# Patient Record
Sex: MALE | Race: WHITE | Employment: FULL TIME | URBAN - METROPOLITAN AREA
[De-identification: names, ages, dates, MRNs, and addresses within clinical notes are randomized per-mention and may not be internally consistent; named-entity substitution may affect disease eponyms.]

---

## 2012-07-27 LAB
AVERAGE GLUCOSE: NORMAL
HBA1C MFR BLD: 11.2 %

## 2017-07-31 DIAGNOSIS — Z91.14 NON COMPLIANCE W MEDICATION REGIMEN: ICD-10-CM

## 2017-07-31 DIAGNOSIS — E11.9 TYPE 2 DIABETES MELLITUS WITHOUT COMPLICATION, WITHOUT LONG-TERM CURRENT USE OF INSULIN (HCC): Primary | ICD-10-CM

## 2017-07-31 PROBLEM — Z91.148 NON COMPLIANCE W MEDICATION REGIMEN: Status: ACTIVE | Noted: 2017-07-31

## 2017-08-01 ENCOUNTER — TELEPHONE (OUTPATIENT)
Dept: FAMILY MEDICINE CLINIC | Age: 52
End: 2017-08-01

## 2017-08-01 ENCOUNTER — OFFICE VISIT (OUTPATIENT)
Dept: FAMILY MEDICINE CLINIC | Age: 52
End: 2017-08-01

## 2017-08-01 VITALS
HEART RATE: 100 BPM | WEIGHT: 204.2 LBS | DIASTOLIC BLOOD PRESSURE: 80 MMHG | SYSTOLIC BLOOD PRESSURE: 126 MMHG | BODY MASS INDEX: 27.66 KG/M2 | HEIGHT: 72 IN

## 2017-08-01 DIAGNOSIS — E11.9 TYPE 2 DIABETES MELLITUS WITHOUT COMPLICATION, WITHOUT LONG-TERM CURRENT USE OF INSULIN (HCC): Primary | ICD-10-CM

## 2017-08-01 DIAGNOSIS — Z91.14 NON COMPLIANCE W MEDICATION REGIMEN: ICD-10-CM

## 2017-08-01 PROCEDURE — 99213 OFFICE O/P EST LOW 20 MIN: CPT | Performed by: PHYSICIAN ASSISTANT

## 2017-08-01 RX ORDER — ATORVASTATIN CALCIUM 40 MG/1
40 TABLET, FILM COATED ORAL DAILY
Qty: 30 TABLET | Refills: 3 | Status: SHIPPED | OUTPATIENT
Start: 2017-08-01 | End: 2020-06-15 | Stop reason: ALTCHOICE

## 2017-08-01 ASSESSMENT — PATIENT HEALTH QUESTIONNAIRE - PHQ9
2. FEELING DOWN, DEPRESSED OR HOPELESS: 0
1. LITTLE INTEREST OR PLEASURE IN DOING THINGS: 0
SUM OF ALL RESPONSES TO PHQ9 QUESTIONS 1 & 2: 0
SUM OF ALL RESPONSES TO PHQ QUESTIONS 1-9: 0

## 2017-08-01 ASSESSMENT — ENCOUNTER SYMPTOMS
RESPIRATORY NEGATIVE: 1
GASTROINTESTINAL NEGATIVE: 1

## 2017-08-18 ENCOUNTER — TELEPHONE (OUTPATIENT)
Dept: FAMILY MEDICINE CLINIC | Age: 52
End: 2017-08-18

## 2017-08-18 ENCOUNTER — OFFICE VISIT (OUTPATIENT)
Dept: FAMILY MEDICINE CLINIC | Age: 52
End: 2017-08-18

## 2017-08-18 VITALS
HEIGHT: 72 IN | OXYGEN SATURATION: 97 % | WEIGHT: 205.4 LBS | BODY MASS INDEX: 27.82 KG/M2 | HEART RATE: 110 BPM | DIASTOLIC BLOOD PRESSURE: 80 MMHG | SYSTOLIC BLOOD PRESSURE: 106 MMHG

## 2017-08-18 DIAGNOSIS — M25.571 ARTHRALGIA OF BOTH ANKLES: ICD-10-CM

## 2017-08-18 DIAGNOSIS — E11.9 TYPE 2 DIABETES MELLITUS WITHOUT COMPLICATION, WITHOUT LONG-TERM CURRENT USE OF INSULIN (HCC): ICD-10-CM

## 2017-08-18 DIAGNOSIS — M25.541 ARTHRALGIA OF BOTH HANDS: ICD-10-CM

## 2017-08-18 DIAGNOSIS — M25.542 ARTHRALGIA OF BOTH HANDS: ICD-10-CM

## 2017-08-18 DIAGNOSIS — M25.572 ARTHRALGIA OF BOTH ANKLES: ICD-10-CM

## 2017-08-18 DIAGNOSIS — R60.9 DEPENDENT EDEMA: ICD-10-CM

## 2017-08-18 DIAGNOSIS — M25.522 ARTHRALGIA OF BOTH ELBOWS: Primary | ICD-10-CM

## 2017-08-18 DIAGNOSIS — M25.521 ARTHRALGIA OF BOTH ELBOWS: Primary | ICD-10-CM

## 2017-08-18 LAB — SEDIMENTATION RATE, ERYTHROCYTE: 36 MM/HR (ref 0–20)

## 2017-08-18 PROCEDURE — 36415 COLL VENOUS BLD VENIPUNCTURE: CPT | Performed by: FAMILY MEDICINE

## 2017-08-18 PROCEDURE — 99213 OFFICE O/P EST LOW 20 MIN: CPT | Performed by: FAMILY MEDICINE

## 2017-08-18 RX ORDER — ROSUVASTATIN CALCIUM 20 MG/1
20 TABLET, COATED ORAL DAILY
Qty: 30 TABLET | Refills: 3 | Status: SHIPPED | OUTPATIENT
Start: 2017-08-18 | End: 2020-06-15 | Stop reason: ALTCHOICE

## 2017-08-18 NOTE — TELEPHONE ENCOUNTER
Swelling from trulicity is rare- it is listed as possible, but so are upper respiratory tract infections  If he is having that much swelling, he needs to be seen. If not here, somewhere. Since it is a weekly medicine, when did he stop it? It must be replaced.

## 2017-08-21 LAB
ANA INTERPRETATION: NORMAL
ANTI-NUCLEAR ANTIBODY (ANA): NEGATIVE

## 2017-08-23 ENCOUNTER — TELEPHONE (OUTPATIENT)
Dept: FAMILY MEDICINE CLINIC | Age: 52
End: 2017-08-23

## 2017-08-23 NOTE — TELEPHONE ENCOUNTER
[place] is calling from [address] regarding the Bydureon pens. Insurance is requesting a prior authorization.

## 2017-08-25 ENCOUNTER — TELEPHONE (OUTPATIENT)
Dept: FAMILY MEDICINE CLINIC | Age: 52
End: 2017-08-25

## 2017-08-25 DIAGNOSIS — E11.9 TYPE 2 DIABETES MELLITUS WITHOUT COMPLICATION, WITHOUT LONG-TERM CURRENT USE OF INSULIN (HCC): Primary | ICD-10-CM

## 2017-08-29 ENCOUNTER — OFFICE VISIT (OUTPATIENT)
Dept: FAMILY MEDICINE CLINIC | Age: 52
End: 2017-08-29

## 2017-08-29 VITALS
OXYGEN SATURATION: 98 % | RESPIRATION RATE: 20 BRPM | BODY MASS INDEX: 27.77 KG/M2 | HEART RATE: 124 BPM | SYSTOLIC BLOOD PRESSURE: 108 MMHG | WEIGHT: 205 LBS | HEIGHT: 72 IN | TEMPERATURE: 99.2 F | DIASTOLIC BLOOD PRESSURE: 80 MMHG

## 2017-08-29 DIAGNOSIS — R73.01 ELEVATED FASTING BLOOD SUGAR: ICD-10-CM

## 2017-08-29 DIAGNOSIS — E11.9 TYPE 2 DIABETES MELLITUS WITHOUT COMPLICATION, WITHOUT LONG-TERM CURRENT USE OF INSULIN (HCC): ICD-10-CM

## 2017-08-29 DIAGNOSIS — R21 RASH AND NONSPECIFIC SKIN ERUPTION: ICD-10-CM

## 2017-08-29 DIAGNOSIS — Z91.14 NON COMPLIANCE W MEDICATION REGIMEN: ICD-10-CM

## 2017-08-29 DIAGNOSIS — R60.0 PEDAL EDEMA: ICD-10-CM

## 2017-08-29 DIAGNOSIS — R50.9 DEHYDRATION FEVER: Primary | ICD-10-CM

## 2017-08-29 LAB
GLUCOSE BLD-MCNC: 214 MG/DL
HBA1C MFR BLD: 10.3 %

## 2017-08-29 PROCEDURE — 99213 OFFICE O/P EST LOW 20 MIN: CPT | Performed by: PHYSICIAN ASSISTANT

## 2017-08-29 PROCEDURE — 83036 HEMOGLOBIN GLYCOSYLATED A1C: CPT | Performed by: PHYSICIAN ASSISTANT

## 2017-08-29 PROCEDURE — 82962 GLUCOSE BLOOD TEST: CPT | Performed by: PHYSICIAN ASSISTANT

## 2017-08-29 RX ORDER — IBUPROFEN 200 MG
200 TABLET ORAL EVERY 6 HOURS PRN
COMMUNITY
End: 2019-02-21

## 2017-09-11 ENCOUNTER — TELEPHONE (OUTPATIENT)
Dept: FAMILY MEDICINE CLINIC | Age: 52
End: 2017-09-11

## 2017-09-11 DIAGNOSIS — E11.9 TYPE 2 DIABETES MELLITUS WITHOUT COMPLICATION, WITHOUT LONG-TERM CURRENT USE OF INSULIN (HCC): ICD-10-CM

## 2017-10-13 NOTE — TELEPHONE ENCOUNTER
Tita Davis is requesting refill(s) pen needles  Last OV 8/29/17 (pertaining to medication)  LR  (per medication requested)  Next office visit scheduled or attempted No   If no, reason:

## 2018-10-03 ENCOUNTER — OFFICE VISIT (OUTPATIENT)
Dept: PULMONOLOGY | Age: 53
End: 2018-10-03
Payer: COMMERCIAL

## 2018-10-03 VITALS
WEIGHT: 206 LBS | BODY MASS INDEX: 27.9 KG/M2 | SYSTOLIC BLOOD PRESSURE: 118 MMHG | OXYGEN SATURATION: 99 % | RESPIRATION RATE: 16 BRPM | HEART RATE: 98 BPM | HEIGHT: 72 IN | DIASTOLIC BLOOD PRESSURE: 81 MMHG | TEMPERATURE: 97.4 F

## 2018-10-03 DIAGNOSIS — G47.33 OBSTRUCTIVE SLEEP APNEA: Primary | ICD-10-CM

## 2018-10-03 DIAGNOSIS — Z71.89 ENCOUNTER FOR BIPAP USE COUNSELING: ICD-10-CM

## 2018-10-03 DIAGNOSIS — Z72.821 POOR SLEEP HYGIENE: ICD-10-CM

## 2018-10-03 DIAGNOSIS — Z72.820 SLEEP DEPRIVATION: ICD-10-CM

## 2018-10-03 DIAGNOSIS — G47.10 HYPERSOMNIA: ICD-10-CM

## 2018-10-03 PROCEDURE — 99204 OFFICE O/P NEW MOD 45 MIN: CPT | Performed by: NURSE PRACTITIONER

## 2018-10-03 ASSESSMENT — SLEEP AND FATIGUE QUESTIONNAIRES
NECK CIRCUMFERENCE (INCHES): 16.75
HOW LIKELY ARE YOU TO NOD OFF OR FALL ASLEEP WHILE SITTING AND TALKING TO SOMEONE: 3
HOW LIKELY ARE YOU TO NOD OFF OR FALL ASLEEP WHILE SITTING QUIETLY AFTER LUNCH WITHOUT ALCOHOL: 3
HOW LIKELY ARE YOU TO NOD OFF OR FALL ASLEEP WHILE SITTING INACTIVE IN A PUBLIC PLACE: 3
HOW LIKELY ARE YOU TO NOD OFF OR FALL ASLEEP IN A CAR, WHILE STOPPED FOR A FEW MINUTES IN TRAFFIC: 3
HOW LIKELY ARE YOU TO NOD OFF OR FALL ASLEEP WHILE LYING DOWN TO REST IN THE AFTERNOON WHEN CIRCUMSTANCES PERMIT: 3
HOW LIKELY ARE YOU TO NOD OFF OR FALL ASLEEP WHILE WATCHING TV: 3
HOW LIKELY ARE YOU TO NOD OFF OR FALL ASLEEP WHILE SITTING AND READING: 3
HOW LIKELY ARE YOU TO NOD OFF OR FALL ASLEEP WHEN YOU ARE A PASSENGER IN A CAR FOR AN HOUR WITHOUT A BREAK: 0
ESS TOTAL SCORE: 21

## 2018-10-03 NOTE — PATIENT INSTRUCTIONS
Absolutely no driving if sleepy. It is your responsibility not to drive if fatigued, tired, or sleepy     Please keep all of your future appointments scheduled by [address], [facility]) Pulmonary and Sleep. Remember to bring your sleep machine, cord and mask to each appointment    You should have a follow up appointment scheduled with a sleep medicine provider within 12 months. Routine parts, masks, tubing and filters should all be obtainable from your DME (equipment) provider. Any problems related to mask fit, comfort or functioning of equipment should also be addressed directly with your DME provider. If you are unable to resolve problems, then please notify our office and schedule an appointment to be seen sooner than the usual follow up appointment. For all patients who are evaluated for sleep disorders, never drive or operate motorized equipment while sleepy, fatigued or groggy. Please bring in all of your sleep equipment to all of your appointments, including machine, mask, cords and hoses. Here are some tips to to getting better sleep  1- Avoid napping during the day: This will ensure you are tired at bedtime. If you have to take a nap, sleep less than one hour, before 3 pm.   2- Exercise regularly, but not right before bed: but the timing of the workout is important. Exercising in the morning or early afternoon will not interfere with sleep. Exercising within two hours before bedtime can decrease your ability to fall asleep. Regular exercise is recommended to help you deepen the sleep. 3- Avoid heavy, spicy, or sugary foods 4-6 hours before bedtime: These can affect your ability to stay asleep. 4- Have a light snack before bed: Having an empty stomach can interfere with your sleep. Dairy products and turkey contain tryptophan, which acts as a natural sleep inducer.    5- Stay away from caffeine, nicotine and alcohol at least 4-6 hours before bed: Caffeine and nicotine are stimulants that interfere with your ability to fall asleep. While alcohol has an immediate sleep-inducing effect, a few hours later, as alcohol levels in your blood start to fall, there is a stimulant effect and you will experience fragmented sleep. 6- Take a hot bath 90 minutes before bedtime:  A hot bath will raise your body temperature, but it is the drop in body temperature that may leave you feeling sleepy  7- Develop sleep rituals: it is important to give your body cues that it is time to slow down and sleep. Listen to relaxing music, read something soothing for 15 minutes, have a cup of caffeine free tea, or do relaxation exercises such as yoga or deep breathing help relieve anxiety and reduce muscle tension. 8- Fix a bedtime and an awakening time: Even on weekends! When your sleep cycle has a regular rhythm, you will feel better. 9- Sleep only when sleepy: This reduces the time you are awake in bed. 10- Get into your favorite sleeping position: If you can't fall asleep within 15-30 minutes, get up and do something boring until you feel sleepy. Sit quietly in the dark or read the warranty on your refrigerator. Don't expose yourself to bright light while you are up, it gives cues to your brain that it is time to wake up. 11- Only use your bed for sleeping: Dont use the bed as an office, workroom or recreation room. Let your body \"know\" that the bed is associated with sleeping  12- Use comfortable bedding. Uncomfortable bedding can prevent good sleep. Evaluate whether or not this is a source of your problem, and make appropriate changes. 13- Make sure your bed and bedroom are quiet and comfortable: A hot room can be uncomfortable. A cooler room, along with enough blankets to stay warm is recommended. Get a blackout shade or wear a slumber mask and wear earplugs or get a \"white noise\" machine for light and noise distractions.    14- Use sunlight to set your biological clock: When you get up in the morning, go outside and turn your face to the sun for 15 minutes. 13- Dont take your worries to bed: Leave worries about job, school, daily life, etc., behind when you go to bed. Some people find it useful to assign a \"worry period\" during the evening or afternoon for these issues. CPAP Equipment Cleaning and Disinfecting Schedule  Equipment Cleaning Frequency Instructions  Disinfecting Frequency   Non-Disposable Filters  Weekly Mild soapy water, Rinse, Air Dry Not Required   Disposable Filters Change as needed  2-4 weeks Do Not Wash Not Required   Hose/tubing Daily Mild soapy water, Rinse, Air Dry Once a week   Mask / Nasal Pillows Daily Mild soapy water, Rinse, Air Dry Once a week   Headgear Weekly Hand wash, Mild soapy water, Rinse, Dry  Not Required   Humidifier Daily Empty water daily  Mild soapy water, Rinse well, Air Dry  Once a week   CPAP Unit As Needed Dust with damp cloth,  No detergents or sprays Not Required         Disinfect (per schedule) with 1 part white vinegar and 3 parts water- soak mask and water chamber for 30 minutes every 1-2 weeks, more often if sick. Allow water/vinegar mixture to run through tubing. Allow all equipment to air dry. Drying Hints:   Always hang tubing away from direct sunlight, as this will cause the tubing to become yellow, brittle and crack over a period of time. DO NOT attach the wet tubing to your CPAP unit to blow-dry it. The moisture from the tubing can drain back into your machine. Moisture in your unit can cause sudden pressure increases or short circuits  DO's and DON'Ts:  - Don't use alcohol-based products to clean your mask, because it can cause the materials to become hard and brittle. - Don't put headgear in the washer or dryer  - Don't use any caustic or household cleaning solutions such as bleach on your CPAP   equipment.  - Do follow the recommended cleaning schedule. - Do change your disposable filter frequently.      Adapted From: [employer].uy. com/cleaning. shtm.   These are general suggestions for all models please follow specific s recommendations and specific instructions

## 2018-10-31 ENCOUNTER — TELEPHONE (OUTPATIENT)
Dept: PULMONOLOGY | Age: 53
End: 2018-10-31

## 2018-10-31 NOTE — TELEPHONE ENCOUNTER
GEETA Portillo, APRN - CNP Cc: Joy Denise; Shae Batista; [facility] Practice Support   Caller: Unspecified (Today,  6:54 AM)             Kamlesh will not approve an In lab titration study d/t patient does not have any co-morbidities that would contraindicate a trial of auto pap or have already failed a trial of auto pap. If you do not agree with this determination a peer to peer can be done by calling [phone] refer to member ID # [id number].

## 2018-11-06 ENCOUNTER — OFFICE VISIT (OUTPATIENT)
Dept: PULMONOLOGY | Age: 53
End: 2018-11-06
Payer: COMMERCIAL

## 2018-11-06 VITALS
HEART RATE: 87 BPM | OXYGEN SATURATION: 98 % | TEMPERATURE: 97.7 F | RESPIRATION RATE: 16 BRPM | DIASTOLIC BLOOD PRESSURE: 78 MMHG | SYSTOLIC BLOOD PRESSURE: 114 MMHG | HEIGHT: 72 IN | WEIGHT: 206 LBS | BODY MASS INDEX: 27.9 KG/M2

## 2018-11-06 DIAGNOSIS — G47.33 OBSTRUCTIVE SLEEP APNEA: Primary | ICD-10-CM

## 2018-11-06 DIAGNOSIS — G47.10 HYPERSOMNIA: ICD-10-CM

## 2018-11-06 DIAGNOSIS — Z72.821 POOR SLEEP HYGIENE: ICD-10-CM

## 2018-11-06 DIAGNOSIS — Z71.89 ENCOUNTER FOR BIPAP USE COUNSELING: ICD-10-CM

## 2018-11-06 DIAGNOSIS — R06.83 SNORING: ICD-10-CM

## 2018-11-06 PROCEDURE — 99214 OFFICE O/P EST MOD 30 MIN: CPT | Performed by: NURSE PRACTITIONER

## 2018-11-06 ASSESSMENT — SLEEP AND FATIGUE QUESTIONNAIRES
HOW LIKELY ARE YOU TO NOD OFF OR FALL ASLEEP WHILE WATCHING TV: 3
HOW LIKELY ARE YOU TO NOD OFF OR FALL ASLEEP IN A CAR, WHILE STOPPED FOR A FEW MINUTES IN TRAFFIC: 2
HOW LIKELY ARE YOU TO NOD OFF OR FALL ASLEEP WHILE SITTING INACTIVE IN A PUBLIC PLACE: 3
HOW LIKELY ARE YOU TO NOD OFF OR FALL ASLEEP WHILE SITTING QUIETLY AFTER LUNCH WITHOUT ALCOHOL: 3
ESS TOTAL SCORE: 21
HOW LIKELY ARE YOU TO NOD OFF OR FALL ASLEEP WHILE SITTING AND READING: 3
NECK CIRCUMFERENCE (INCHES): 17
HOW LIKELY ARE YOU TO NOD OFF OR FALL ASLEEP WHILE LYING DOWN TO REST IN THE AFTERNOON WHEN CIRCUMSTANCES PERMIT: 3
HOW LIKELY ARE YOU TO NOD OFF OR FALL ASLEEP WHEN YOU ARE A PASSENGER IN A CAR FOR AN HOUR WITHOUT A BREAK: 2
HOW LIKELY ARE YOU TO NOD OFF OR FALL ASLEEP WHILE SITTING AND TALKING TO SOMEONE: 2

## 2018-11-06 NOTE — PROGRESS NOTES
endocrine in a few weeks. ESS is 21        Sleep Medicine 11/6/2018 10/3/2018 9/8/2015   Sitting and reading 3 3 3   Watching TV 3 3 3   Sitting, inactive in a public place (e.g. a theatre or a meeting) 3 3 3   As a passenger in a car for an hour without a break 2 0 3   Lying down to rest in the afternoon when circumstances permit 3 3 3   Sitting and talking to someone 2 3 2   Sitting quietly after a lunch without alcohol 3 3 3   In a car, while stopped for a few minutes in traffic 2 3 2   Total score 21 21 22   Neck circumference 17 16.75 17.25       Past Medical History:  Past Medical History:   Diagnosis Date    Depression     Gout     Hyperlipidemia     Type II or unspecified type diabetes mellitus without mention of complication, not stated as uncontrolled     Unspecified sleep apnea        Past Surgical History:        Procedure Laterality Date    WISDOM TOOTH EXTRACTION  1986       Allergies:  is allergic to indomethacin. Social History:    TOBACCO:   reports that he has never smoked. He has never used smokeless tobacco.  ETOH:   reports that he does not drink alcohol.     Family History:   Family History   Problem Relation Age of Onset    Cancer Father 39        colon    Diabetes Father     Alzheimer's Disease Father     Diabetes Mother     Diabetes Maternal Aunt     Cancer Sister         breast       Current Medications:    Current Outpatient Prescriptions:     Insulin Pen Needle 31G X 5 MM MISC, 1 each by Does not apply route daily, Disp: 100 each, Rfl: 3    Liraglutide (VICTOZA) 18 MG/3ML SOPN SC injection, Inject 1.2 mg into the skin daily, Disp: 4 Pen, Rfl: 1    ibuprofen (ADVIL;MOTRIN) 200 MG tablet, Take 200 mg by mouth every 6 hours as needed for Pain, Disp: , Rfl:     Exenatide (BYDUREON) 2 MG PEN, Inject 1 Pen into the skin once a week, Disp: 4 Pen, Rfl: 2    rosuvastatin (CRESTOR) 20 MG tablet, Take 1 tablet by mouth daily, Disp: 30 tablet, Rfl: 3    Exenatide (BYDUREON) 2 MG PEN, Inject 1 Pen into the skin once a week, Disp: 2 Pen, Rfl: 0    metFORMIN (GLUCOPHAGE) 500 MG tablet, Take 2 tablets by mouth 2 times daily (with meals), Disp: 60 tablet, Rfl: 3    atorvastatin (LIPITOR) 40 MG tablet, Take 1 tablet by mouth daily, Disp: 30 tablet, Rfl: 3    Dulaglutide (TRULICITY) 1.5 GN/4.2DL SOPN, Inject 1 Pen into the skin once a week, Disp: 4 Pen, Rfl: 1    Dulaglutide (TRULICITY) 9.03 XY/9.0JH SOPN, Inject 0.75 mg into the skin once a week, Disp: 2 Pen, Rfl: 0    MICROLET LANCETS MISC, 1 each by Does not apply route 2 times daily, Disp: 100 each, Rfl: 3      Objective:   PHYSICAL EXAM:    /78 (Site: Left Upper Arm, Position: Sitting)   Pulse 87   Temp 97.7 °F (36.5 °C) (Oral)   Resp 16   Ht 6' (1.829 m)   Wt 206 lb (93.4 kg)   SpO2 98%   BMI 27.94 kg/m²     Physical exam:  Gen: No acute distress. BMI of 27.94  Eyes: PERRL. No sclera icterus. No conjunctival injection. ENT: No discharge. Pharynx clear. Mallampati class IV. Neck: Trachea midline. No obvious mass. Neck circumference 17\"  Resp: No accessory muscle use. No crackles. No wheezes. No rhonchi. CV: Regular rate. Regular rhythm. No murmur or rub. Skin: Warm and dry. M/S: No cyanosis. No obvious joint deformity. Neuro: Awake. Alert. Moves all four extremities. Psych: Oriented x 3. No anxiety. DATA:   11/28/03 PSG RDI 13.2, low SPO2 93%  1/30/14 BiPAP titration recommendations BiPAP 17/13 cm H2O    BiPAP compliance data:  Compliance download report from 9/3/18 to 10/2/18 reviewed today by me and showed patient is using machine 2:43 hrs/night with 20% compliance and AHI 5.6 within this time frame. 6/30days with greater than 4 hours of machine use. 28/30 days with any CPAP use. BIPAP 14/10 cm H20    11/6/18-Unable to download compliance report today. Information obtained from BiPAP at bedside and showed patient is using machine 2:30 hrs/night and AHI 4.1 within this time frame.   4/30days with

## 2018-11-16 ENCOUNTER — HOSPITAL ENCOUNTER (OUTPATIENT)
Dept: SLEEP CENTER | Age: 53
Discharge: HOME | End: 2018-11-16
Payer: COMMERCIAL

## 2018-11-16 DIAGNOSIS — G47.33 OBSTRUCTIVE SLEEP APNEA: ICD-10-CM

## 2018-11-16 PROCEDURE — 95811 POLYSOM 6/>YRS CPAP 4/> PARM: CPT

## 2018-11-20 ENCOUNTER — TELEPHONE (OUTPATIENT)
Dept: PULMONOLOGY | Age: 53
End: 2018-11-20

## 2018-11-20 DIAGNOSIS — G47.33 OBSTRUCTIVE SLEEP APNEA: Primary | ICD-10-CM

## 2018-11-20 DIAGNOSIS — G47.33 OSA (OBSTRUCTIVE SLEEP APNEA): Primary | ICD-10-CM

## 2018-11-20 NOTE — TELEPHONE ENCOUNTER
See scanned email from patient. Okay to order ResMed airsense 10 BiPAP as well as heated tubing per patient preference.   Per recent titration he does not need BiPAP ST.

## 2018-11-21 DIAGNOSIS — G47.33 OBSTRUCTIVE SLEEP APNEA: Primary | ICD-10-CM

## 2018-12-10 ENCOUNTER — TELEPHONE (OUTPATIENT)
Dept: PULMONOLOGY | Age: 53
End: 2018-12-10

## 2018-12-13 ENCOUNTER — TELEPHONE (OUTPATIENT)
Dept: PULMONOLOGY | Age: 53
End: 2018-12-13

## 2018-12-13 NOTE — TELEPHONE ENCOUNTER
Patient called states he is not getting any response from [address] [place] [address] regarding Bipap set up. Called lm for Annika Pak at [address] [facility]. This has been going on for over 1 mo.

## 2018-12-13 NOTE — TELEPHONE ENCOUNTER
Patient called back states [facility] Mccain has called him asked his if he lives in [place] said they did not have his orders. Patient requested orders to be faxed to different company. Orders faxed to [employer] [facility]. Will f/u with patient next week to make sure he was called and set up.

## 2019-02-14 ENCOUNTER — TELEPHONE (OUTPATIENT)
Dept: PULMONOLOGY | Age: 54
End: 2019-02-14

## 2019-02-21 ENCOUNTER — HOSPITAL ENCOUNTER (EMERGENCY)
Age: 54
Discharge: HOME | End: 2019-02-21
Attending: EMERGENCY MEDICINE
Payer: COMMERCIAL

## 2019-02-21 VITALS
OXYGEN SATURATION: 99 % | WEIGHT: 213 LBS | BODY MASS INDEX: 28.85 KG/M2 | RESPIRATION RATE: 14 BRPM | HEIGHT: 72 IN | SYSTOLIC BLOOD PRESSURE: 121 MMHG | HEART RATE: 88 BPM | TEMPERATURE: 97.7 F | DIASTOLIC BLOOD PRESSURE: 63 MMHG

## 2019-02-21 DIAGNOSIS — J02.9 VIRAL PHARYNGITIS: Primary | ICD-10-CM

## 2019-02-21 LAB — S PYO AG THROAT QL: NEGATIVE

## 2019-02-21 PROCEDURE — 87081 CULTURE SCREEN ONLY: CPT

## 2019-02-21 PROCEDURE — 6360000002 HC RX W HCPCS: Performed by: EMERGENCY MEDICINE

## 2019-02-21 PROCEDURE — 99283 EMERGENCY DEPT VISIT LOW MDM: CPT

## 2019-02-21 PROCEDURE — 96372 THER/PROPH/DIAG INJ SC/IM: CPT

## 2019-02-21 PROCEDURE — 87880 STREP A ASSAY W/OPTIC: CPT

## 2019-02-21 RX ORDER — CODEINE PHOSPHATE AND GUAIFENESIN 10; 100 MG/5ML; MG/5ML
5 SOLUTION ORAL 3 TIMES DAILY PRN
Qty: 118 ML | Refills: 0 | Status: SHIPPED | OUTPATIENT
Start: 2019-02-21 | End: 2019-02-26

## 2019-02-21 RX ORDER — IBUPROFEN 800 MG/1
800 TABLET ORAL EVERY 8 HOURS PRN
Qty: 120 TABLET | Refills: 3 | Status: SHIPPED | OUTPATIENT
Start: 2019-02-21 | End: 2020-06-15 | Stop reason: ALTCHOICE

## 2019-02-21 RX ORDER — KETOROLAC TROMETHAMINE 30 MG/ML
15 INJECTION, SOLUTION INTRAMUSCULAR; INTRAVENOUS ONCE
Status: COMPLETED | OUTPATIENT
Start: 2019-02-21 | End: 2019-02-21

## 2019-02-21 RX ADMIN — KETOROLAC TROMETHAMINE: 30 INJECTION, SOLUTION INTRAMUSCULAR; INTRAVENOUS at 20:10

## 2019-02-21 ASSESSMENT — PAIN DESCRIPTION - PROGRESSION: CLINICAL_PROGRESSION: GRADUALLY WORSENING

## 2019-02-21 ASSESSMENT — PAIN DESCRIPTION - DESCRIPTORS: DESCRIPTORS: OTHER (COMMENT)

## 2019-02-21 ASSESSMENT — PAIN - FUNCTIONAL ASSESSMENT: PAIN_FUNCTIONAL_ASSESSMENT: ACTIVITIES ARE NOT PREVENTED

## 2019-02-21 ASSESSMENT — PAIN DESCRIPTION - LOCATION: LOCATION: THROAT

## 2019-02-21 ASSESSMENT — PAIN DESCRIPTION - ONSET: ONSET: PROGRESSIVE

## 2019-02-21 ASSESSMENT — PAIN DESCRIPTION - PAIN TYPE: TYPE: ACUTE PAIN

## 2019-02-21 ASSESSMENT — PAIN DESCRIPTION - FREQUENCY: FREQUENCY: CONTINUOUS

## 2019-02-21 ASSESSMENT — PAIN SCALES - GENERAL: PAINLEVEL_OUTOF10: 6

## 2019-02-23 LAB — S PYO THROAT QL CULT: NORMAL

## 2020-06-15 ENCOUNTER — VIRTUAL VISIT (OUTPATIENT)
Dept: ENDOCRINOLOGY | Age: 55
End: 2020-06-15
Payer: COMMERCIAL

## 2020-06-15 PROCEDURE — 99204 OFFICE O/P NEW MOD 45 MIN: CPT | Performed by: INTERNAL MEDICINE

## 2020-06-15 ASSESSMENT — ENCOUNTER SYMPTOMS
BACK PAIN: 0
ORTHOPNEA: 0
COUGH: 0
PHOTOPHOBIA: 0
DOUBLE VISION: 0
BLURRED VISION: 0
HEMOPTYSIS: 0

## 2020-06-15 NOTE — PROGRESS NOTES
Endocrinology    Rahul Zamora M.D. Phone: [phone]   FAX: [phone]       Ariana Gee   YOB: 1965    Date of Visit:  6/15/2020    Allergies   Allergen Reactions    Indomethacin      diarrhea     No outpatient medications have been marked as taking for the 6/15/20 encounter (Virtual Visit) with Melodie Carrel, MD.         There were no vitals filed for this visit. There is no height or weight on file to calculate BMI. Wt Readings from Last 3 Encounters:   02/21/19 213 lb (96.6 kg)   11/06/18 206 lb (93.4 kg)   10/03/18 206 lb (93.4 kg)     BP Readings from Last 3 Encounters:   02/21/19 121/63   11/06/18 114/78   10/03/18 118/81        Past Medical History:   Diagnosis Date    Depression     Gout     Hyperlipidemia     Type II or unspecified type diabetes mellitus without mention of complication, not stated as uncontrolled     Unspecified sleep apnea      Past Surgical History:   Procedure Laterality Date    WISDOM TOOTH EXTRACTION  1986     Family History   Problem Relation Age of Onset    Cancer Father 39        colon    Diabetes Father     Alzheimer's Disease Father     Diabetes Mother     Diabetes Maternal Aunt     Cancer Sister         breast     Social History     Tobacco Use   Smoking Status Never Smoker   Smokeless Tobacco Never Used      Social History     Substance and Sexual Activity   Alcohol Use No    Alcohol/week: 0.0 standard drinks       HPI      Ariana Gee is a 50 y.o. male who was seen kalpesh virtual visit for management of DM . Self -referred     He saw me in 2014 and then 2015 but did not follow-up. Not following with PCP       Due to the COVID-19 restrictions on close contact interactions the patient's visit was conducted via video link  ( doxy. me) in lieu of a face to face visit.        Location for patient : home  Physician : home    Pursuant to the emergency declaration under the [address] [facility] [place], [address] waiver Psychiatric/Behavioral: Negative for depression, hallucinations, memory loss, substance abuse and suicidal ideas. The patient is not nervous/anxious and does not have insomnia. Physical Exam   Constitutional: He is oriented to person, place, and time. He appears well-developed and well-nourished. Assessment/Plan    1. Type 2 DM     Ariana Gee is a 50 y.o. male has DM. Uncontrolled. A1c 10.3 %     Was last seen in 2015. Not using any medication for 5 years      Will repeat labs and then resume medications. Discussed high risk of complications with uncontrolled DM    Discussed the impact of poor glycemic control on his health. Reviewed microvascular and macrovascular complications of DM. Will order labs  Based on A1c and other labs, will resume diabetic medications          Advised follow-up with the ophthalmologist once year. Last urine microalbumin/cr ratio normal in 04/14. Discussed foot care. Advised to follow-up regularly and follow-up on regular basis. He showed motivation to improve his DM       Advised to see PCP as well.

## 2020-06-16 ENCOUNTER — TELEPHONE (OUTPATIENT)
Dept: ENDOCRINOLOGY | Age: 55
End: 2020-06-16

## 2020-06-17 DIAGNOSIS — E11.9 TYPE 2 DIABETES MELLITUS WITHOUT COMPLICATION, WITHOUT LONG-TERM CURRENT USE OF INSULIN (HCC): ICD-10-CM

## 2020-06-18 LAB
A/G RATIO: 2.2 (ref 1.1–2.2)
ALBUMIN SERPL-MCNC: 4.4 G/DL (ref 3.4–5)
ALP BLD-CCNC: 117 U/L (ref 40–129)
ALT SERPL-CCNC: 41 U/L (ref 10–40)
ANION GAP SERPL CALCULATED.3IONS-SCNC: 11 MMOL/L (ref 3–16)
AST SERPL-CCNC: 20 U/L (ref 15–37)
BILIRUB SERPL-MCNC: 0.7 MG/DL (ref 0–1)
BUN BLDV-MCNC: 16 MG/DL (ref 7–20)
CALCIUM SERPL-MCNC: 9.3 MG/DL (ref 8.3–10.6)
CHLORIDE BLD-SCNC: 96 MMOL/L (ref 99–110)
CHOLESTEROL, TOTAL: 175 MG/DL (ref 0–199)
CO2: 26 MMOL/L (ref 21–32)
CREAT SERPL-MCNC: 1 MG/DL (ref 0.9–1.3)
ESTIMATED AVERAGE GLUCOSE: 257.5 MG/DL
GFR AFRICAN AMERICAN: >60
GFR NON-AFRICAN AMERICAN: >60
GLOBULIN: 2 G/DL
GLUCOSE BLD-MCNC: 243 MG/DL (ref 70–99)
HBA1C MFR BLD: 10.6 %
HDLC SERPL-MCNC: 43 MG/DL (ref 40–60)
LDL CHOLESTEROL CALCULATED: 116 MG/DL
POTASSIUM SERPL-SCNC: 4.5 MMOL/L (ref 3.5–5.1)
SODIUM BLD-SCNC: 133 MMOL/L (ref 136–145)
TOTAL PROTEIN: 6.4 G/DL (ref 6.4–8.2)
TRIGL SERPL-MCNC: 81 MG/DL (ref 0–150)
TSH SERPL DL<=0.05 MIU/L-ACNC: 4.25 UIU/ML (ref 0.27–4.2)
VLDLC SERPL CALC-MCNC: 16 MG/DL

## 2020-06-18 RX ORDER — DULAGLUTIDE 0.75 MG/.5ML
0.75 INJECTION, SOLUTION SUBCUTANEOUS WEEKLY
Qty: 4 PEN | Refills: 3 | Status: SHIPPED | OUTPATIENT
Start: 2020-06-18 | End: 2020-12-18

## 2020-06-19 ENCOUNTER — TELEPHONE (OUTPATIENT)
Dept: ENDOCRINOLOGY | Age: 55
End: 2020-06-19

## 2020-06-22 RX ORDER — FLASH GLUCOSE SENSOR
KIT MISCELLANEOUS
Qty: 2 EACH | Refills: 5 | Status: SHIPPED | OUTPATIENT
Start: 2020-06-22 | End: 2021-01-14 | Stop reason: ALTCHOICE

## 2020-06-22 RX ORDER — FLASH GLUCOSE SCANNING READER
EACH MISCELLANEOUS
Qty: 1 DEVICE | Refills: 0 | Status: SHIPPED | OUTPATIENT
Start: 2020-06-22 | End: 2021-01-14 | Stop reason: ALTCHOICE

## 2020-07-20 RX ORDER — BLOOD-GLUCOSE,RECEIVER,CONT
1 EACH MISCELLANEOUS DAILY
Qty: 1 DEVICE | Refills: 0 | Status: SHIPPED | OUTPATIENT
Start: 2020-07-20 | End: 2020-10-15 | Stop reason: SDUPTHER

## 2020-07-20 RX ORDER — BLOOD-GLUCOSE TRANSMITTER
EACH MISCELLANEOUS
Qty: 1 EACH | Refills: 3 | Status: SHIPPED | OUTPATIENT
Start: 2020-07-20 | End: 2021-01-07 | Stop reason: SDUPTHER

## 2020-07-20 RX ORDER — BLOOD-GLUCOSE SENSOR
EACH MISCELLANEOUS
Qty: 1 EACH | Refills: 11 | Status: SHIPPED | OUTPATIENT
Start: 2020-07-20 | End: 2021-01-07 | Stop reason: SDUPTHER

## 2020-10-15 ENCOUNTER — TELEPHONE (OUTPATIENT)
Dept: PULMONOLOGY | Age: 55
End: 2020-10-15

## 2020-10-15 RX ORDER — BLOOD-GLUCOSE,RECEIVER,CONT
1 EACH MISCELLANEOUS DAILY
Qty: 1 DEVICE | Refills: 0 | Status: SHIPPED | OUTPATIENT
Start: 2020-10-15 | End: 2021-01-07 | Stop reason: SDUPTHER

## 2020-10-15 NOTE — TELEPHONE ENCOUNTER
Aicha Perez, APRN - CNP 14 hours ago (8:12 PM)          I switched jobs and a family member was ill i am very sorry went to [employer] since I just got my new prescription cards please approve  and transmitter would like to do a virtual appointment and share my Dexcom data since I am doing well on it please still allow me to be a patient my voicemail was full and now that is taken care of Thanks

## 2020-10-21 NOTE — TELEPHONE ENCOUNTER
I think he may have been trying to reach endocrine? I think dexcom may be his insulin pump. Can send [employer] message if needed.   Can also r/s appt here as well if he requests

## 2020-10-21 NOTE — TELEPHONE ENCOUNTER
Called patient, JASMYNE full unable to leave message.      Office was unable to reach patient to schedule him an appointment per his request.

## 2020-12-02 NOTE — TELEPHONE ENCOUNTER
LOV- 06/2020  No Show Lu 09/2020  NOV- none        Requested Prescriptions     Pending Prescriptions Disp Refills    metFORMIN (GLUCOPHAGE) 500 MG tablet [Pharmacy Med Name: METFORMIN  MG TABLET] 60 tablet 8     Sig: TAKE 1 TABLET BY MOUTH TWICE A DAY WITH MEALS

## 2020-12-18 RX ORDER — DULAGLUTIDE 0.75 MG/.5ML
0.75 INJECTION, SOLUTION SUBCUTANEOUS WEEKLY
Qty: 2 PEN | Refills: 0 | Status: SHIPPED | OUTPATIENT
Start: 2020-12-18 | End: 2021-01-15

## 2020-12-18 NOTE — TELEPHONE ENCOUNTER
Medication:   Requested Prescriptions     Pending Prescriptions Disp Refills    TRULICITY 0.01 NC/4.5TF SOPN [Pharmacy Med Name: James Silverman 9.05 BZ/0.0 ML PEN]  1     Sig: INJECT 0.75 MG INTO THE SKIN ONCE A WEEK       Last Filled:      Patient Phone Number: [phone] (home)     Last appt: 9/25/2020   Next appt: Visit date not found    Last Labs DM:   Lab Results   Component Value Date    LABA1C 10.6 06/17/2020

## 2021-01-07 DIAGNOSIS — E11.9 TYPE 2 DIABETES MELLITUS WITHOUT COMPLICATION, WITHOUT LONG-TERM CURRENT USE OF INSULIN (HCC): ICD-10-CM

## 2021-01-07 RX ORDER — BLOOD-GLUCOSE TRANSMITTER
EACH MISCELLANEOUS
Qty: 1 EACH | Refills: 3 | Status: SHIPPED | OUTPATIENT
Start: 2021-01-07 | End: 2021-01-18 | Stop reason: SDUPTHER

## 2021-01-07 RX ORDER — BLOOD-GLUCOSE SENSOR
EACH MISCELLANEOUS
Qty: 1 EACH | Refills: 11 | Status: SHIPPED | OUTPATIENT
Start: 2021-01-07 | End: 2022-05-11 | Stop reason: SDUPTHER

## 2021-01-07 RX ORDER — BLOOD-GLUCOSE,RECEIVER,CONT
1 EACH MISCELLANEOUS DAILY
Qty: 1 DEVICE | Refills: 0 | Status: SHIPPED | OUTPATIENT
Start: 2021-01-07 | End: 2021-01-18 | Stop reason: SDUPTHER

## 2021-01-07 NOTE — TELEPHONE ENCOUNTER
Medication:   Requested Prescriptions     Pending Prescriptions Disp Refills    Continuous Blood Gluc Transmit (DEXCOM G6 TRANSMITTER) MISC 1 each 3     Sig: Change every 3 months.  Continuous Blood Gluc Sensor (DEXCOM G6 SENSOR) MISC 1 each 11     Sig: Change every 10 days.     Continuous Blood Gluc  (DEXCOM G6 ) ANIVAL 1 Device 0     Si Device by Does not apply route daily         Last appt: 06/15/2020  Next appt: Visit date not found    Last Labs DM:   Lab Results   Component Value Date    LABA1C 10.6 2020

## 2021-01-15 RX ORDER — DULAGLUTIDE 0.75 MG/.5ML
0.75 INJECTION, SOLUTION SUBCUTANEOUS WEEKLY
Qty: 4 PEN | Refills: 0 | Status: SHIPPED | OUTPATIENT
Start: 2021-01-15 | End: 2021-01-18 | Stop reason: DRUGHIGH

## 2021-01-15 NOTE — PROGRESS NOTES
Peggy Polanco   50 y.o. male   1965    This is patient's first visit with me. Peggy Polanco is here to establish care as their new PCP. 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 [employer] has a PMH significant for:     Patient Active Problem List   Diagnosis    Mixed anxiety and depressive disorder    Obstructive sleep apnea- mild    Gout    Elevated cholesterol    Elevated fasting blood sugar    Family history of colon cancer    Type 2 diabetes mellitus without complication, without long-term current use of insulin ([facility])    Non compliance w medication regimen       Reason(s) for visit:   Chief Complaint   Patient presents with    Annual Exam    Established New Doctor       HPI:    Type II diabetes mellitus:  Regarding diabetes, patient is on medications as noted below in his medication list. Since the last visit. ..  -Last A1c = 10.6 (2020)  -Glucose readings (on average): fastin+  -He has CGM - located on RLQ area  -Denied nausea, dyspepsia, chronic GERD, heartburn, numbness and tingling of hands and feet, etc.    Other:  -Has history of gout, but it's been a while since his last attack. Recent noteworthy labs: 2020  -Lipid panel: normal except LDL = 116  -A1c: 10.6     Health Maintenance history:   [facility] of colon CA? none  -Colon CA screening: c-scope  -Eye exam - 2020 - normal    PDMP report:  -PDMP report was only remarkable for codeine syrup    Allergies   Allergen Reactions    Indomethacin      diarrhea       Current Outpatient Medications on File Prior to Visit   Medication Sig Dispense Refill    metFORMIN (GLUCOPHAGE) 500 MG tablet TAKE 1 TABLET BY MOUTH TWICE A DAY WITH MEALS 60 tablet 8    Continuous Blood Gluc Sensor (DEXCOM G6 SENSOR) MISC Change every 10 days. 1 each 11     No current facility-administered medications on file prior to visit.        Family History   Problem Relation Age of Onset    Cancer Father 39        colon  Diabetes Father     Alzheimer's Disease Father     Diabetes Mother     Diabetes Maternal Aunt     Cancer Sister         breast     Social History     Tobacco Use    Smoking status: Never Smoker    Smokeless tobacco: Never Used   Substance Use Topics    Alcohol use: No     Alcohol/week: 0.0 standard drinks        No results for input(s): WBC, HGB, HCT, MCV, PLT in the last 720 hours. Chemistry        Component Value Date/Time     (L) 06/17/2020 1221    K 4.5 06/17/2020 1221    CL 96 (L) 06/17/2020 1221    CO2 26 06/17/2020 1221    BUN 16 06/17/2020 1221    CREATININE 1.0 06/17/2020 1221        Component Value Date/Time    CALCIUM 9.3 06/17/2020 1221    ALKPHOS 117 06/17/2020 1221    AST 20 06/17/2020 1221    ALT 41 (H) 06/17/2020 1221    BILITOT 0.7 06/17/2020 1221          Lab Results   Component Value Date    ALT 41 (H) 06/17/2020    AST 20 06/17/2020    ALKPHOS 117 06/17/2020    BILITOT 0.7 06/17/2020     Lab Results   Component Value Date    LABA1C 10.6 06/17/2020     Lab Results   Component Value Date    .5 06/17/2020       Review of Systems   Constitutional: Negative for activity change, appetite change, fatigue, fever and unexpected weight change. HENT: Negative for congestion, rhinorrhea, sinus pressure and trouble swallowing. Respiratory: Negative for cough, chest tightness, shortness of breath and wheezing. Cardiovascular: Negative for chest pain, palpitations and leg swelling. Gastrointestinal: Negative for abdominal distention, abdominal pain, blood in stool, constipation, diarrhea, nausea and vomiting. Genitourinary: Negative for dysuria, frequency and hematuria. Musculoskeletal: Negative for arthralgias and back pain. Skin: Negative for rash. Neurological: Negative for dizziness, weakness, light-headedness, numbness and headaches.       Wt Readings from Last 3 Encounters:   01/18/21 208 lb 9.6 oz (94.6 kg)   02/21/19 213 lb (96.6 kg) 11/06/18 206 lb (93.4 kg)       BP Readings from Last 3 Encounters:   01/18/21 116/76   02/21/19 121/63   11/06/18 114/78       Pulse Readings from Last 3 Encounters:   01/18/21 95   02/21/19 88   11/06/18 87       /76   Pulse 95   Temp 96.9 °F (36.1 °C)   Ht 6' (1.829 m)   Wt 208 lb 9.6 oz (94.6 kg)   SpO2 99%   BMI 28.29 kg/m²      Physical Exam  Vitals signs reviewed. Constitutional:       General: He is awake. He is not in acute distress. Appearance: He is not ill-appearing or diaphoretic. HENT:      Head: Normocephalic and atraumatic. Right Ear: External ear normal.      Left Ear: External ear normal.      Nose: Nose normal.   Eyes:      Extraocular Movements: Extraocular movements intact. Conjunctiva/sclera: Conjunctivae normal.   Neck:      Musculoskeletal: Normal range of motion. No erythema. Cardiovascular:      Rate and Rhythm: Normal rate and regular rhythm. Pulmonary:      Effort: Pulmonary effort is normal. No respiratory distress. Breath sounds: No wheezing, rhonchi or rales. Abdominal:      General: Abdomen is flat. There is no distension. Palpations: Abdomen is soft. Musculoskeletal: Normal range of motion. General: No deformity. Right lower leg: No edema. Left lower leg: No edema. Skin:     Coloration: Skin is not cyanotic, jaundiced or pale. Findings: No abrasion, abscess, bruising, ecchymosis, erythema, signs of injury, laceration, lesion, petechiae, rash or wound. Neurological:      General: No focal deficit present. Mental Status: He is alert. Mental status is at baseline. Coordination: Coordination normal.   Psychiatric:         Attention and Perception: Attention and perception normal.         Mood and Affect: Mood and affect normal.         Speech: Speech normal.         Behavior: Behavior normal. Behavior is cooperative. Thought Content:  Thought content normal.       Assessment/Plan: Ghada Cali was seen today for annual exam and established new doctor. Diagnoses and all orders for this visit:    Encounter for preventative adult health care examination  Comments:  Discussed about doing either colonoscopy or Cologuard later in the year. Patient stated that he will think about it. Completed physical form. Type 2 diabetes mellitus without complication, without long-term current use of insulin ([facility])  Comments:  Discussed that having diabetes requires a major lifestyle change. Extensive counseling was given to the patient, who was informed of the microvascular and macrovascular complications of diabetes: increased risk of CAD, MI, CVA, CKD/ESRD (leading to dialysis), diabetic retinopathy, diabetic gastroparesis, diabetic neuropathy, chronic wound infections (eg osteomyelitis), etc.  Patient was informed that diabetics need to be seen every 3 months for routine A1c blood testing and that the goal of A1c is under 7.0. for most patients and under 8.5 for the elderly. Patient was counseled also to take the medications as prescribed and to check glucose as directed, especially if they're symptomatic (malaise, weak, fatigue, clammy, dizzy, etc.) and to keep food/beverage with him at all times in case his glucose is low. He was also advised, especially in the setting of severe neuropathy with numbness to never walk around barefoot. Orders:  -     Basic Metabolic Panel; Future  -     Hemoglobin A1C; Future  -     Dulaglutide 1.5 MG/0.5ML SOPN; Inject 1.5 mg into the skin once a week    Lipid screening  -     Lipid Panel; Future    History of gout  -     URIC ACID; Future    Need for 23-polyvalent pneumococcal polysaccharide vaccine  -     Pneumococcal polysaccharide vaccine 23-valent greater than or equal to 1yo subcutaneous/IM       I reviewed the plan of care with the patient. Patient acknowledged understanding and agreed with plan of care overall.     Medications Discontinued During This Encounter Medication Reason    Continuous Blood Gluc Sensor (FREESTYLE EDMUND 14 DAY SENSOR) MISC Therapy completed    Continuous Blood Gluc  (FREESTYLE EDMUND 14 DAY READER) ANIVAL Therapy completed    TRULICITY 4.63 RJ/7.6MB SOPN DOSE ADJUSTMENT    Continuous Blood Gluc  (DEXCOM G6 ) ANIVAL DUPLICATE    Continuous Blood Gluc Transmit (DEXCOM G6 TRANSMITTER) MISC DUPLICATE        Patient was informed that whether starting or adding a new medication or increasing the dose of a current medication, the benefits and risks have to be considered and weighed over, especially if the patient is taking other medications as well. Patient was also informed that there are no medications that have no side effects and there is always a risk involved with taking a medication. If a side effect were to occur with starting a new medication or with increasing the dose of a current medication that either the medication can be totally discontinued altogether or simply decrease the dose of it and based on this, a follow-up appointment is necessary. The drug allergy list will then be updated with the corresponding side effects if it's deemed to be a true 'drug allergy'. The most common adverse effects of medication(s) were addressed at today's visit. Lastly, the patient was informed that the coverage status of a medication may vary from insurance to insurance and the only way to verify if the medication is covered is to send an actual prescription in. The patient was also informed that the cost of medications vary from insurance to insurance. I reviewed the plan of care with the patient. Patient acknowledged understanding and agreed with plan of care overall.     Estimated length of time of today's visit: 20 minutes    PDMP Monitoring:   Last PDMP Ash Crowder as Reviewed [facility]):  Review User Review Instant Review Result   [address] Steve Sheppard CHRISTIAN 1/9/2021  6:29 PM Reviewed PDMP [1] Follow-up: Return in about 8 weeks (around 3/15/2021) for IP - increased Trulicity dose. . Patient was informed that if his or her symptoms worsen to return here or go to nearest ER if symptoms significantly worsen. Regarding my note: This note was composed to the best of my knowledge and recollection of the encounter with the patient using one of my own customized note templates utilizing a combination of typing and dictating with the [address] [place] [id number] speech recognition software. As a result, the note may possibly have various errors (e.g. spelling, grammar, and non-sensible words/phrases/statements) despite reviewing the note prior to signing it for completion. It is worth noting that most of the time, notes are completed usually long after the patient is seen and are strived to be completed in a timely fashion (ideally within 72 hours of the patient encounter). It is also worth noting that healthcare providers often see several patients a day (e.g. > 15-30 patients/day) in either the outpatient and/or inpatient setting(s). In addition, healthcare providers spend a significant amount of time reviewing multiple patients' charts in preparation for their future encounters (pre-charting) as well as time spent reviewing any labs, imaging, specialist's notes (if relevant), and other documents (e.g. recent ER visits or hospital stays or completing forms such as FMLA, short-term/long-term disability), etc.  Time and effort is also allocated to coordinating with office staff to make sure various things are completed as part of the day-to-day office management responsibilities. Given all this, it is worth pointing out that not every detail can be remembered and not everything discussed is considered relevant, significant, or noteworthy. If one has any questions or concerns about my given note, please take into consideration all these aforementioned things before contacting. Armando Noland M.D.   [address] [facility]    Electronically signed by Eloisa Waters on 1/18/2021 at 9:19 AM.

## 2021-01-15 NOTE — TELEPHONE ENCOUNTER
Medication:   Requested Prescriptions     Pending Prescriptions Disp Refills    TRULICITY 2.71 AY/6.1JN SOPN [Pharmacy Med Name: Nicolas Ferraro 6.44 PB/2.7 ML PEN]  0     Sig: INJECT 0.75 MG INTO THE SKIN ONCE A WEEK       Last Filled:      Patient Phone Number: [phone] (home)     Last appt: 9/25/2020   Next appt: Visit date not found    Last Labs DM:   Lab Results   Component Value Date    LABA1C 10.6 06/17/2020

## 2021-01-18 ENCOUNTER — OFFICE VISIT (OUTPATIENT)
Dept: FAMILY MEDICINE CLINIC | Age: 56
End: 2021-01-18
Payer: COMMERCIAL

## 2021-01-18 VITALS
SYSTOLIC BLOOD PRESSURE: 116 MMHG | HEART RATE: 95 BPM | DIASTOLIC BLOOD PRESSURE: 76 MMHG | WEIGHT: 208.6 LBS | HEIGHT: 72 IN | TEMPERATURE: 96.9 F | BODY MASS INDEX: 28.25 KG/M2 | OXYGEN SATURATION: 99 %

## 2021-01-18 DIAGNOSIS — Z13.220 LIPID SCREENING: ICD-10-CM

## 2021-01-18 DIAGNOSIS — Z00.00 ENCOUNTER FOR PREVENTATIVE ADULT HEALTH CARE EXAMINATION: Primary | ICD-10-CM

## 2021-01-18 DIAGNOSIS — E11.9 TYPE 2 DIABETES MELLITUS WITHOUT COMPLICATION, WITHOUT LONG-TERM CURRENT USE OF INSULIN (HCC): ICD-10-CM

## 2021-01-18 DIAGNOSIS — Z87.39 HISTORY OF GOUT: ICD-10-CM

## 2021-01-18 DIAGNOSIS — Z23 NEED FOR 23-POLYVALENT PNEUMOCOCCAL POLYSACCHARIDE VACCINE: ICD-10-CM

## 2021-01-18 PROCEDURE — 99386 PREV VISIT NEW AGE 40-64: CPT | Performed by: FAMILY MEDICINE

## 2021-01-18 PROCEDURE — 90732 PPSV23 VACC 2 YRS+ SUBQ/IM: CPT | Performed by: FAMILY MEDICINE

## 2021-01-18 PROCEDURE — 90471 IMMUNIZATION ADMIN: CPT | Performed by: FAMILY MEDICINE

## 2021-01-18 ASSESSMENT — ENCOUNTER SYMPTOMS
TROUBLE SWALLOWING: 0
SINUS PRESSURE: 0
ABDOMINAL DISTENTION: 0
COUGH: 0
RHINORRHEA: 0
BLOOD IN STOOL: 0
DIARRHEA: 0
BACK PAIN: 0
VOMITING: 0
ABDOMINAL PAIN: 0
NAUSEA: 0
CONSTIPATION: 0
CHEST TIGHTNESS: 0
SHORTNESS OF BREATH: 0
WHEEZING: 0

## 2021-01-19 DIAGNOSIS — E11.9 TYPE 2 DIABETES MELLITUS WITHOUT COMPLICATION, WITHOUT LONG-TERM CURRENT USE OF INSULIN (HCC): ICD-10-CM

## 2021-01-19 DIAGNOSIS — Z13.220 LIPID SCREENING: ICD-10-CM

## 2021-01-19 DIAGNOSIS — Z87.39 HISTORY OF GOUT: ICD-10-CM

## 2021-01-19 LAB
ANION GAP SERPL CALCULATED.3IONS-SCNC: 10 MMOL/L (ref 3–16)
BUN BLDV-MCNC: 18 MG/DL (ref 7–20)
CALCIUM SERPL-MCNC: 9.6 MG/DL (ref 8.3–10.6)
CHLORIDE BLD-SCNC: 99 MMOL/L (ref 99–110)
CHOLESTEROL, TOTAL: 150 MG/DL (ref 0–199)
CO2: 27 MMOL/L (ref 21–32)
CREAT SERPL-MCNC: 1.2 MG/DL (ref 0.9–1.3)
GFR AFRICAN AMERICAN: >60
GFR NON-AFRICAN AMERICAN: >60
GLUCOSE BLD-MCNC: 244 MG/DL (ref 70–99)
HDLC SERPL-MCNC: 40 MG/DL (ref 40–60)
LDL CHOLESTEROL CALCULATED: 90 MG/DL
POTASSIUM SERPL-SCNC: 4.3 MMOL/L (ref 3.5–5.1)
SODIUM BLD-SCNC: 136 MMOL/L (ref 136–145)
TRIGL SERPL-MCNC: 99 MG/DL (ref 0–150)
URIC ACID, SERUM: 5.5 MG/DL (ref 3.5–7.2)
VLDLC SERPL CALC-MCNC: 20 MG/DL

## 2021-01-20 DIAGNOSIS — E11.65 UNCONTROLLED TYPE 2 DIABETES MELLITUS WITH HYPERGLYCEMIA (HCC): Primary | ICD-10-CM

## 2021-01-20 LAB
ESTIMATED AVERAGE GLUCOSE: 254.7 MG/DL
HBA1C MFR BLD: 10.5 %

## 2021-02-06 DIAGNOSIS — E11.9 TYPE 2 DIABETES MELLITUS WITHOUT COMPLICATION, WITHOUT LONG-TERM CURRENT USE OF INSULIN (HCC): ICD-10-CM

## 2021-02-08 RX ORDER — DULAGLUTIDE 1.5 MG/.5ML
INJECTION, SOLUTION SUBCUTANEOUS
Qty: 4 PEN | Refills: 0 | Status: SHIPPED | OUTPATIENT
Start: 2021-02-08 | End: 2021-03-10 | Stop reason: DRUGHIGH

## 2021-02-26 ENCOUNTER — NURSE TRIAGE (OUTPATIENT)
Dept: OTHER | Facility: CLINIC | Age: 56
End: 2021-02-26

## 2021-02-26 ENCOUNTER — OFFICE VISIT (OUTPATIENT)
Dept: FAMILY MEDICINE CLINIC | Age: 56
End: 2021-02-26
Payer: COMMERCIAL

## 2021-02-26 VITALS
HEART RATE: 87 BPM | BODY MASS INDEX: 27.71 KG/M2 | HEIGHT: 72 IN | OXYGEN SATURATION: 99 % | SYSTOLIC BLOOD PRESSURE: 120 MMHG | WEIGHT: 204.6 LBS | DIASTOLIC BLOOD PRESSURE: 80 MMHG | TEMPERATURE: 97 F

## 2021-02-26 DIAGNOSIS — M65.4 DE QUERVAIN'S SYNDROME (TENOSYNOVITIS): ICD-10-CM

## 2021-02-26 DIAGNOSIS — M79.642 LEFT HAND PAIN: Primary | ICD-10-CM

## 2021-02-26 PROCEDURE — 99213 OFFICE O/P EST LOW 20 MIN: CPT | Performed by: CLINICAL NURSE SPECIALIST

## 2021-02-26 ASSESSMENT — ENCOUNTER SYMPTOMS
VOMITING: 0
NAUSEA: 0
DIARRHEA: 0
ABDOMINAL PAIN: 0
SHORTNESS OF BREATH: 0
CHEST TIGHTNESS: 0
COUGH: 0
WHEEZING: 0
CONSTIPATION: 0

## 2021-02-26 NOTE — PATIENT INSTRUCTIONS
Trial of naproxen twice daily as needed for pain (with food and full glass of water)     No Aleve, Advil, Ibuprofen, Motrin or aspirin while taking the naproxen. Watch for GI symptoms (heart burn, indigestion, epigastric pain or blood in stool)    Can take tylenol as needed/directed (not exceed 9815-5747 mg in a 24 hour period)     Stretches/exercises given. Heat or ice, whichever feels better. Thumb Sicka splint    Follow-up with PCP as scheduled on 3/15/2020    Patient Education        Carpal Tunnel Syndrome: Care Instructions  Overview     Carpal tunnel syndrome is numbness, tingling, weakness, and pain in your hand, wrist, and sometimes forearm. It is caused by pressure on the median nerve. This nerve and several tough tissues called tendons run through a space in the wrist. This space is called the carpal tunnel. The repeated hand motions used in work and some hobbies and sports can put pressure on the median nerve. Pregnancy can cause carpal tunnel syndrome. Several conditions, such as diabetes, arthritis, and an underactive thyroid, can also cause it. You may be able to limit an activity or change the way you do it to reduce your symptoms. You also can take other steps to feel better. If your symptoms are mild, 1 to 2 weeks of home treatment are likely to ease your pain. Surgery is needed only if other treatments do not work. Follow-up care is a key part of your treatment and safety. Be sure to make and go to all appointments, and call your doctor if you are having problems. It's also a good idea to know your test results and keep a list of the medicines you take. How can you care for yourself at home? · If possible, stop or reduce the activity that causes your symptoms. If you cannot stop the activity, take frequent breaks to rest and stretch or change hand positions to do a task. Try switching hands, such as when using a computer mouse. · Try to avoid bending or twisting your wrists. · Ask your doctor if you can take an over-the-counter pain medicine, such as acetaminophen (Tylenol), ibuprofen (Advil, Motrin), or naproxen (Aleve). Be safe with medicines. Read and follow all instructions on the label. · If your doctor prescribes corticosteroid medicine to help reduce pain and swelling, take it exactly as prescribed. Call your doctor if you think you are having a problem with your medicine. · Put ice or a cold pack on your wrist for 10 to 20 minutes at a time to ease pain. Put a thin cloth between the ice and your skin. · If your doctor or your physical or occupational therapist tells you to wear a wrist splint, wear it as directed to keep your wrist in a neutral position. This also eases pressure on your median nerve. · Ask your doctor whether you should have physical or occupational therapy to learn how to do tasks differently. · Try a yoga class to stretch your muscles and build strength in your hands and wrists. Yoga has been shown to ease carpal tunnel symptoms. To prevent carpal tunnel  · When working at a computer, keep your hands and wrists in line with your forearms. Hold your elbows close to your sides. Take a break every 10 to 15 minutes. · Try these exercises:  ? Warm up: Rotate your wrist up, down, and from side to side. Repeat this 4 times. Stretch your fingers far apart, relax them, then stretch them again. Repeat 4 times. Stretch your thumb by pulling it back gently, holding it, and then releasing it. Repeat 4 times. ? Prayer stretch: Start with your palms together in front of your chest just below your chin. Slowly lower your hands toward your waistline while keeping your hands close to your stomach and your palms together until you feel a mild to moderate stretch under your forearms. Hold for 10 to 20 seconds. Repeat 4 times. ? Wrist flexor stretch: Hold your arm in front of you with your palm up. Bend your wrist, pointing your hand toward the floor. With your other hand, gently bend your wrist further until you feel a mild to moderate stretch in your forearm. Hold for 10 to 20 seconds. Repeat 4 times. ? Wrist extensor stretch: Repeat the steps for the wrist flexor stretch, but begin with your extended hand palm down. · Squeeze a rubber exercise ball several times a day to keep your hands and fingers strong. · Avoid holding objects (such as a book) in one position for a long time. When possible, use your whole hand to grasp an object. Using just the thumb and index finger can put stress on the wrist.  · Do not smoke. It can make this condition worse by reducing blood flow to the median nerve. If you need help quitting, talk to your doctor about stop-smoking programs and medicines. These can increase your chances of quitting for good. When should you call for help? Watch closely for changes in your health, and be sure to contact your doctor if:    · Your pain or other problems do not get better with home care.     · You want more information about physical or occupational therapy.     · You have side effects of your corticosteroid medicine, such as:  ? Weight gain. ? Mood changes. ? Trouble sleeping. ? Bruising easily.     · You have any other problems with your medicine. Where can you learn more? Go to https://Divshotrica.Concurrent Thinking. org and sign in to your InLive Interactive account. Enter R432 in the ShopEx box to learn more about \"Carpal Tunnel Syndrome: Care Instructions. \"     If you do not have an account, please click on the \"Sign Up Now\" link. Current as of: March 2, 2020               Content Version: 12.6  © 6169-2296 cocone, eWave Interactive. Care instructions adapted under license by [facility] ([facility]). If you have questions about a medical condition or this instruction, always ask your healthcare professional. [place] [address] any warranty or liability for your use of this information. Patient Education        Carpal Tunnel Syndrome: Exercises  Introduction  Here are some examples of exercises for you to try. The exercises may be suggested for a condition or for rehabilitation. Start each exercise slowly. Ease off the exercises if you start to have pain. You will be told when to start these exercises and which ones will work best for you. Warm-up stretches  When you no longer have pain or numbness, you can do exercises to help prevent carpal tunnel syndrome from coming back. Do not do any stretch or movement that is uncomfortable or painful. 1. Rotate your wrist up, down, and from side to side. Repeat 4 times. 2. Stretch your fingers far apart. Relax them, and then stretch them again. Repeat 4 times. 3. Stretch your thumb by pulling it back gently, holding it, and then releasing it. Repeat 4 times. How to do the exercises  Prayer stretch   1. Start with your palms together in front of your chest just below your chin. 2. Slowly lower your hands toward your waistline, keeping your hands close to your stomach and your palms together until you feel a mild to moderate stretch under your forearms. 3. Hold for at least 15 to 30 seconds. Repeat 2 to 4 times. Wrist flexor stretch   1. Extend your arm in front of you with your palm up. 2. Bend your wrist, pointing your hand toward the floor. 3. With your other hand, gently bend your wrist farther until you feel a mild to moderate stretch in your forearm. 4. Hold for at least 15 to 30 seconds. Repeat 2 to 4 times. Wrist extensor stretch   1. Repeat steps 1 through 4 of the stretch above, but begin with your extended hand palm down. · Until your symptoms are better, stop the activities that caused the pain. · Avoid moving the hand and wrist that hurt. · Follow your doctor's directions for wearing a splint to keep your thumb and wrist from moving. · Try ice or heat. ? Put ice or a cold pack on your thumb and wrist for 10 to 20 minutes at a time. Put a thin cloth between the ice and your skin. ? You can use heat for 20 to 30 minutes, 2 or 3 times a day. Try using a heating pad, hot shower, or hot pack. · Ask your doctor if you can take an over-the-counter pain medicine, such as acetaminophen (Tylenol), ibuprofen (Advil, Motrin), or naproxen (Aleve). Be safe with medicines. Read and follow all instructions on the label. When should you call for help? Watch closely for changes in your health, and be sure to contact your doctor if:    · You have new or worse pain.     · You have new or worse numbness or tingling in your hand or fingers.     · Your hand feels weaker.     · You do not get better as expected. Where can you learn more? Go to https://Staff Ranker.WigWag. org and sign in to your O'ol Blue account. Enter E875 in the [facility] box to learn more about \"De Quervain's Tenosynovitis: Care Instructions. \"     If you do not have an account, please click on the \"Sign Up Now\" link. Current as of: March 2, 2020               Content Version: 12.6  © 8653-0647 Healthwise, Incorporated. Care instructions adapted under license by [facility] ([facility]). If you have questions about a medical condition or this instruction, always ask your healthcare professional. [place] [address] any warranty or liability for your use of this information.          Patient Education        Canary Jurist Disease: Exercises  Introduction Here are some examples of exercises for you to try. The exercises may be suggested for a condition or for rehabilitation. Start each exercise slowly. Ease off the exercises if you start to have pain. You will be told when to start these exercises and which ones will work best for you. How to do the exercises  Thumb lifts   4. Place your hand on a flat surface, with your palm up. 5. Lift your thumb away from your palm to make a \"C\" shape. 6. Hold for about 6 seconds. 7. Repeat 8 to 12 times. Passive thumb MP flexion   4. Hold your hand in front of you, and turn your hand so your little finger faces down and your thumb faces up. (Your hand should be in the position used for shaking someone's hand.) You may also rest your hand on a flat surface. 5. Use the fingers on your other hand to bend your thumb down at the point where your thumb connects to your palm. 6. Hold for at least 15 to 30 seconds. 7. Repeat 2 to 4 times. Finkelstein stretch   5. Hold your arms out in front of you. (Your hand should be in the position used for shaking someone's hand.)  6. Bend your thumb toward your palm. 7. Use your other hand to gently stretch your thumb and wrist downward until you feel the stretch on the thumb side of your wrist.  8. Hold for at least 15 to 30 seconds. 9. Repeat 2 to 4 times. Resisted ulnar deviation   For this exercise, you will need elastic exercise material, such as surgical tubing or Thera-Band.  2. Sit leaning forward with your legs slightly spread and your elbow on your thigh. 3. Grasp one end of the band with your palm down, and step on the other end with the foot opposite the hand holding the band. 4. Slowly bend your wrist sideways and away from your knee. 5. Repeat 8 to 12 times. Follow-up care is a key part of your treatment and safety. Be sure to make and go to all appointments, and call your doctor if you are having problems. It's also a good idea to know your test results and keep a list of the medicines you take. Where can you learn more? Go to https://chpepiceweb.Equidate. org and sign in to your Splore account. Enter Y608 in the Guided Interventions box to learn more about \"De Quervain's Disease: Exercises. \"     If you do not have an account, please click on the \"Sign Up Now\" link. Current as of: March 2, 2020               Content Version: 12.6  © 2006-2020 CIRQY, Incorporated. Care instructions adapted under license by [facility] ([facility]). If you have questions about a medical condition or this instruction, always ask your healthcare professional. [address] any warranty or liability for your use of this information.

## 2021-02-26 NOTE — TELEPHONE ENCOUNTER
Laid off work 1 month ago. Went back this week. Has severe pain in left hand. Weakness and numbness. Reason for Disposition   SEVERE pain (e.g., excruciating, unable to use hand at all)    Answer Assessment - Initial Assessment Questions  1. ONSET: \"When did the pain start? \"      Monday     2. LOCATION: \"Where is the pain located? \"      Left hand     3. PAIN: \"How bad is the pain? \" (Scale 1-10; or mild, moderate, severe)    - MILD (1-3): doesn't interfere with normal activities    - MODERATE (4-7): interferes with normal activities (e.g., work or school) or awakens from sleep    - SEVERE (8-10): excruciating pain, unable to use hand at all      When working: 10/10  Constant 4-5/10    4. WORK OR EXERCISE: \"Has there been any recent work or exercise that involved this part of the body? \"      Yes, work     5. CAUSE: \"What do you think is causing the pain? \"      Injury from work     6. AGGRAVATING FACTORS: \"What makes the pain worse? \" (e.g., using computer)      Picking things up, using hands       7. OTHER SYMPTOMS: \"Do you have any other symptoms? \" (e.g., neck pain, swelling, rash, numbness, fever)      Numbness and weakness     8. PREGNANCY: \"Is there any chance you are pregnant? \" \"When was your last menstrual period? \"      No    Protocols used: HAND AND WRIST PAIN-ADULT-[place]    Patient called [facility])  with red flag complaint. Brief description of triage: hand pain. Triage indicates for patient to be seen in office today. Care advice provided, patient verbalizes understanding; denies any other questions or concerns; instructed to call back for any new or worsening symptoms. Writer provided warm transfer to Batool Celis at [place] for appointment scheduling. Attention Provider: Thank you for allowing me to participate in the care of your patient. The patient was connected to triage in response to information provided to the [facility].   Please do not respond through this encounter as the response is not directed to a shared pool.

## 2021-02-26 NOTE — PROGRESS NOTES
SUBJECTIVE:    Patient ID:  Carol Tse is a 50 y.o. male      Patient is here for an acute visit for complaints of left hand pain for about a week. Patient went back to work on Monday after being of fur low. Right hand dominate. 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 Hand Pain   The incident occurred 5 to 7 days ago. Incident location: possible at work. The injury mechanism was repetitive motion. The pain is present in the left hand. The quality of the pain is described as aching, burning, cramping, shooting and stabbing. The pain does not radiate. Pain scale: 5-10. The pain has been constant since the incident. Pertinent negatives include no chest pain. Exacerbated by: \"work\" certaion movments. He has tried heat for the symptoms. Current Outpatient Medications on File Prior to Visit   Medication Sig Dispense Refill    TRULICITY 1.5 HW/0.6XT SOPN INJECT 1.5 MG INTO THE SKIN ONCE A WEEK 4 pen 0    dapagliflozin (FARXIGA) 10 MG tablet Take 1 tablet by mouth every morning 90 tablet 0    Continuous Blood Gluc Sensor (DEXCOM G6 SENSOR) MISC Change every 10 days. 1 each 11    metFORMIN (GLUCOPHAGE) 500 MG tablet TAKE 1 TABLET BY MOUTH TWICE A DAY WITH MEALS 60 tablet 8     No current facility-administered medications on file prior to visit.        Past Medical History:   Diagnosis Date    Depression     Gout     Hyperlipidemia     Type II or unspecified type diabetes mellitus without mention of complication, not stated as uncontrolled     Unspecified sleep apnea      Past Surgical History:   Procedure Laterality Date    WISDOM TOOTH EXTRACTION  1986     Family History   Problem Relation Age of Onset    Cancer Father 39        colon    Diabetes Father     Alzheimer's Disease Father     Diabetes Mother  Diabetes Maternal Aunt     Cancer Sister         breast     Social History     Socioeconomic History    Marital status:      Spouse name: Not on file    Number of children: 2    Years of education: Not on file    Highest education level: Not on file   Occupational History    Occupation:    Social Needs    Financial resource strain: Not on file    Food insecurity     Worry: Not on file     Inability: Not on file   [employer] needs     Medical: Not on file     Non-medical: Not on file   Tobacco Use    Smoking status: Never Smoker    Smokeless tobacco: Never Used   Substance and Sexual Activity    Alcohol use: No     Alcohol/week: 0.0 standard drinks    Drug use: No    Sexual activity: Not on file   Lifestyle    Physical activity     Days per week: Not on file     Minutes per session: Not on file    Stress: Not on file   Relationships    Social connections     Talks on phone: Not on file     Gets together: Not on file     Attends Confucianism service: Not on file     Active member of club or organization: Not on file     Attends meetings of clubs or organizations: Not on file     Relationship status: Not on file    Intimate partner violence     Fear of current or ex partner: Not on file     Emotionally abused: Not on file     Physically abused: Not on file     Forced sexual activity: Not on file   Other Topics Concern    Not on file   Social History Narrative    Not on file       Review of Systems   Constitutional: Negative for chills and fever. Eyes: Negative for visual disturbance. Respiratory: Negative for cough, chest tightness, shortness of breath and wheezing. Cardiovascular: Negative for chest pain and palpitations. Gastrointestinal: Negative for abdominal pain, constipation, diarrhea, nausea and vomiting. Musculoskeletal: Positive for arthralgias (left hand/thumb). Negative for myalgias. Skin: Negative for rash. Neurological: Negative for headaches. OBJECTIVE:    Physical Exam  Vitals signs and nursing note reviewed. Constitutional:       General: He is not in acute distress. Appearance: He is well-developed. HENT:      Head: Normocephalic and atraumatic. Right Ear: External ear normal.      Left Ear: External ear normal.      Nose: Nose normal.   Eyes:      Conjunctiva/sclera: Conjunctivae normal.      Pupils: Pupils are equal, round, and reactive to light. Neck:      Musculoskeletal: Normal range of motion and neck supple. Trachea: No tracheal deviation. Cardiovascular:      Rate and Rhythm: Normal rate and regular rhythm. Heart sounds: Normal heart sounds. Pulmonary:      Effort: Pulmonary effort is normal. No respiratory distress. Breath sounds: Normal breath sounds. No wheezing or rales. Chest:      Chest wall: No tenderness. Abdominal:      General: Bowel sounds are normal. There is no distension. Palpations: Abdomen is soft. Tenderness: There is no abdominal tenderness. Musculoskeletal: Normal range of motion. Skin:     General: Skin is warm and dry. Findings: No rash. Neurological:      Mental Status: He is alert and oriented to person, place, and time. Psychiatric:         Behavior: Behavior normal.       /80   Pulse 87   Temp 97 °F (36.1 °C)   Ht 6' (1.829 m)   Wt 204 lb 9.6 oz (92.8 kg)   SpO2 99%   BMI 27.75 kg/m²    BP Readings from Last 3 Encounters:   02/26/21 120/80   01/18/21 116/76   02/21/19 121/63      Wt Readings from Last 3 Encounters:   02/26/21 204 lb 9.6 oz (92.8 kg)   01/18/21 208 lb 9.6 oz (94.6 kg)   02/21/19 213 lb (96.6 kg)       ASSESSMENT & PLAN:    1. Left hand pain  - naproxen (NAPROSYN) 375 MG tablet; Take 1 tablet by mouth 2 times daily (with meals)  Dispense: 60 tablet; Refill: 0    2. De Quervain's syndrome (tenosynovitis)  - naproxen (NAPROSYN) 375 MG tablet; Take 1 tablet by mouth 2 times daily (with meals)  Dispense: 60 tablet;  Refill: 0

## 2021-02-28 RX ORDER — NAPROXEN 375 MG/1
375 TABLET ORAL 2 TIMES DAILY WITH MEALS
Qty: 60 TABLET | Refills: 0 | Status: SHIPPED | OUTPATIENT
Start: 2021-02-28 | End: 2021-03-10 | Stop reason: ALTCHOICE

## 2021-03-09 ENCOUNTER — TELEPHONE (OUTPATIENT)
Dept: FAMILY MEDICINE CLINIC | Age: 56
End: 2021-03-09

## 2021-03-09 NOTE — PROGRESS NOTES
True Aponte   50 y.o. male   1965    HPI:    Patient was last seen by me on 1/18/2021, which was his first visit with me to establish care as his new PCP. Reason(s) for visit:   Chief Complaint   Patient presents with    Hand Pain     L. Pt reported that it's throbbing. Saw Olena Kern in Feb.    Discuss Medications     Trulicity       Type II diabetes mellitus:  Regarding diabetes, patient is on medications as noted below in his medication list. Since the last visit. ..  -Last A1c = 10.5 (1/19/2022)  -Glucose readings (on average): 228 around midnight. Currently 156.  -He has CGM - located on [facility] area  -Denied nausea, dyspepsia, chronic GERD, heartburn, numbness and tingling of hands and feet, etc.  -Other: regarding any side effects with Trulicity 1.5 mg, patient stated that he had no issues    Chronic hand pain:  -In addition above, we discussed for the first time of chronic left hand pain. He actually discussed this issue with my partner (Olena Concha - NP) on 2/26/2021. He reported then of 1 week history of left hand pain. Patient is right-hand dominant. He attributed this left hand pain to work. 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 He stated that he has to tug on the carpet and has to \"tear them apart\". He has to do this several hours a day and works full-time. His history has become so severe that he was \"written up\" because he cannot continue performing his work. He has been under a lot of stress lately.    -He described his left hand pain as aching, burning, cramping, shooting, and stabbing in nature.   He denied issues with numbness.  -No prior history of CTS; however, he did report of having \"several EMGs done before\" and a remote history of an MRI of cervical (?) spine.    -Patient stated that his pain is mainly triggered when his hand is curled. He vocalized desire to quit his job and is currently studying to work in [employer] for [employer] ([employer]). His step-son works as a  at [employer].    Other:  -Patient also informed me for the first time that based on his MRI findings that there is plan to have \"a metal plate inserted on the back of my neck\"; however the surgery was never done due to his poorly controlled diabetes. There are no MRI let alone CT findings of his spine anywhere on file. He has had several x-rays of his cervical spine done with the most recent one being done on 4/26/2017, which showed \"degenerative changes particular at C5-C6). No signs of fracture were seen. Allergies   Allergen Reactions    Indomethacin      diarrhea       Current Outpatient Medications on File Prior to Visit   Medication Sig Dispense Refill    dapagliflozin (FARXIGA) 10 MG tablet Take 1 tablet by mouth every morning 90 tablet 0    metFORMIN (GLUCOPHAGE) 500 MG tablet TAKE 1 TABLET BY MOUTH TWICE A DAY WITH MEALS 60 tablet 8    Continuous Blood Gluc Sensor (DEXCOM G6 SENSOR) MISC Change every 10 days. 1 each 11     No current facility-administered medications on file prior to visit. Family History   Problem Relation Age of Onset    Cancer Father 39        colon    Diabetes Father     Alzheimer's Disease Father     Diabetes Mother     Diabetes Maternal Aunt     Cancer Sister         breast       Social History     Tobacco Use    Smoking status: Never Smoker    Smokeless tobacco: Never Used   Substance Use Topics    Alcohol use: No     Alcohol/week: 0.0 standard drinks        No results for input(s): WBC, HGB, HCT, MCV, PLT in the last 720 hours.        Chemistry        Component Value Date/Time     01/19/2021 1545    K 4.3 01/19/2021 1545    CL 99 01/19/2021 1545    CO2 27 01/19/2021 1545    BUN 18 01/19/2021 1545    CREATININE 1.2 01/19/2021 1545        Component Value Date/Time    CALCIUM 9.6 01/19/2021 1545    ALKPHOS 117 06/17/2020 1221    AST 20 06/17/2020 1221    ALT 41 (H) 06/17/2020 1221    BILITOT 0.7 06/17/2020 1221          Lab Results   Component Value Date    ALT 41 (H) 06/17/2020    AST 20 06/17/2020    ALKPHOS 117 06/17/2020    BILITOT 0.7 06/17/2020     Lab Results   Component Value Date    LABA1C 10.5 01/19/2021     Lab Results   Component Value Date    .7 01/19/2021       Review of Systems   Constitutional: Negative for activity change, appetite change, fatigue, fever and unexpected weight change. HENT: Negative for congestion, rhinorrhea, sinus pressure and trouble swallowing. Respiratory: Negative for cough, chest tightness, shortness of breath and wheezing. Cardiovascular: Negative for chest pain, palpitations and leg swelling. Gastrointestinal: Negative for abdominal distention, abdominal pain, blood in stool, constipation, diarrhea, nausea and vomiting. Genitourinary: Negative for dysuria, frequency and hematuria. Musculoskeletal: Positive for arthralgias, neck pain and neck stiffness. Negative for back pain, gait problem and joint swelling. Skin: Negative for rash. Neurological: Positive for weakness. Negative for dizziness, light-headedness, numbness and headaches. Psychiatric/Behavioral: Positive for sleep disturbance. Wt Readings from Last 3 Encounters:   03/10/21 203 lb 6.4 oz (92.3 kg)   02/26/21 204 lb 9.6 oz (92.8 kg)   01/18/21 208 lb 9.6 oz (94.6 kg)       BP Readings from Last 3 Encounters:   03/10/21 110/62   02/26/21 120/80   01/18/21 116/76       Pulse Readings from Last 3 Encounters:   03/10/21 85   02/26/21 87   01/18/21 95       /62   Pulse 85   Temp 97.1 °F (36.2 °C)   Wt 203 lb 6.4 oz (92.3 kg)   SpO2 99%   BMI 27.59 kg/m²      Physical Exam  Vitals signs reviewed. Constitutional:       General: He is awake.  He is not in acute distress. Appearance: He is overweight. He is not ill-appearing or diaphoretic. HENT:      Head: Normocephalic and atraumatic. Right Ear: External ear normal.      Left Ear: External ear normal.      Nose: Nose normal.   Eyes:      Extraocular Movements: Extraocular movements intact. Conjunctiva/sclera: Conjunctivae normal.   Neck:      Musculoskeletal: Normal range of motion. No erythema. Cardiovascular:      Rate and Rhythm: Normal rate and regular rhythm. Pulmonary:      Effort: Pulmonary effort is normal. No respiratory distress. Breath sounds: No wheezing, rhonchi or rales. Abdominal:      General: Abdomen is flat. There is no distension. Palpations: Abdomen is soft. Musculoskeletal: Normal range of motion. General: No deformity. Right lower leg: No edema. Left lower leg: No edema. Skin:     Coloration: Skin is not cyanotic, jaundiced or pale. Findings: No abrasion, abscess, bruising, ecchymosis, erythema, signs of injury, laceration, lesion, petechiae, rash or wound. Neurological:      General: No focal deficit present. Mental Status: He is alert. Mental status is at baseline. Coordination: Coordination normal.   Psychiatric:         Attention and Perception: Attention and perception normal.         Mood and Affect: Mood and affect normal.         Speech: Speech normal.         Behavior: Behavior normal. Behavior is cooperative. Thought Content: Thought content normal.       Assessment/Plan:   Nidia Holloway was seen today for hand pain and discuss medications. Diagnoses and all orders for this visit:    Type 2 diabetes mellitus without complication, without long-term current use of insulin ([facility] [address]  Comments:  Patient declined going up on Metformin. I discussed with patient that having diabetes requires a major lifestyle change.   Extensive counseling was given to the patient, who was informed of the microvascular and macrovascular complications of diabetes: increased risk of CAD, MI, CVA, CKD/ESRD (leading to dialysis), diabetic retinopathy, diabetic gastroparesis, diabetic neuropathy, chronic wound infections (eg osteomyelitis), etc.      Patient was informed that diabetics need to be seen every 3 months for routine A1c blood testing and that the goal of A1c is under 7.0 (and under 6.5 for more aggressive treatment) for most patients and under 8.5 for the elderly. Patient was counseled also to take the medications as prescribed and to check glucose as directed, especially if they're symptomatic (malaise, weak, fatigue, clammy, dizzy, etc.) and to keep food/beverage with him at all times in case his glucose is low. Patient was also advised, especially in the setting of severe neuropathy with numbness to never walk around barefoot. Informed patient of benefits of being on GLP1 & SGLT2 therapy that go beyond glycemic control - low risk of hypoglycemia, weight loss, decreased risk of cardiovascular complications, etc.  Orders:  -     Dulaglutide (TRULICITY) 3 FW/6.0JK SOPN; Inject 3 mg into the skin once a week    Cervical radiculopathy  Comments: Will obtain XR first and we discussed at some point obtaining MRI cervical area. Orders:  -     XR CERVICAL SPINE (4-5 VIEWS); Future  -     gabapentin (NEURONTIN) 300 MG capsule; Take 1 capsule by mouth 2 times daily for 30 days. Intended supply: 30 days  -     meloxicam (MOBIC) 15 MG tablet; Take 1 tablet by mouth daily as needed for Pain (arthritis)    Left hand pain  Comments:  Informed patient that he is at risk of developing CTS. Orders:  -     diclofenac sodium (VOLTAREN) 1 % GEL; Apply 2 g topically every 6 hours as needed for Pain (back pain)  -     meloxicam (MOBIC) 15 MG tablet; Take 1 tablet by mouth daily as needed for Pain (arthritis)    Other diabetic neurological complication associated with type 2 diabetes mellitus (HCC)  -     gabapentin (NEURONTIN) 300 MG capsule;  Take 1 capsule by mouth 2 times daily for 30 days. Intended supply: 30 days    Stress at work      I reviewed the plan of care with the patient. Patient acknowledged understanding and agreed with plan of care overall. Medications Discontinued During This Encounter   Medication Reason    TRULICITY 1.5 LL/5.0EA SOPN DOSE ADJUSTMENT    naproxen (NAPROSYN) 375 MG tablet Alternate therapy        General information on medications:  -When it comes to medications, whether with starting or adding a new medication or increasing the dose of a current medication, the benefits and risks have to always be considered and weighed over, especially if one is taking other medications as well.   -Patient was informed that there are no medications that have no side effects and that there is always a risk involved with taking a medication.    -If a side effect were to occur with starting a new medication or with increasing the dose of a current medication that either the medication can be totally discontinued altogether or simply decrease the dose of it and if this would be the case a follow-up appointment would be deemed necessary.    -The drug allergy list will then be updated with the corresponding side effect(s) if it's deemed to be a true 'drug allergy'. -The most common adverse effects of medication(s) were addressed at today's visit.    -Lastly, the patient was informed that the coverage status of a medication may vary from insurance to insurance and the only way to verify if the medication is covered is to send an actual prescription in. The patient was also informed that the cost of medications vary from insurance to insurance. Estimated length of time of today's visit: 30 minutes    Follow-up: Return in about 8 weeks (around 5/5/2021) for A1c check. .     Patient was informed that if his or her symptoms worsen to follow up with me sooner or go to the nearest ER if the symptoms are very significant and warrant higher level of care. Regarding my note: This note was composed to the best of my knowledge and recollection of the encounter with the patient using one of my own customized note templates utilizing a combination of typing and dictating with the [address] [place] [address] speech recognition software. As a result, the note may possibly have various errors (e.g. spelling, grammar, and non-sensible words/phrases/statements) despite reviewing the note prior to signing it for completion. It is worth noting that most of the time, progress notes are completed usually long after the patient was seen. Every effort is made to have notes as well as other things that needed to be attended to (e.g. medication refills, MyChart messages, documents that require reviewing, etc.) be addressed and completed in a timely fashion (ideally within 72 hours of the patient encounter). It is also worth noting that healthcare providers often see several patients a day (e.g. > 15-30 patients/day) in either the outpatient and/or inpatient setting(s). In addition, healthcare providers spend a significant amount of time reviewing multiple patients' charts in preparation for their future encounters (pre-charting) as well as time spent reviewing any labs, imaging, specialist's notes (if relevant), and other documents (e.g. recent ER visits or hospital stays or completing forms such as FMLA, short-term/long-term disability), etc.  Time and effort is also allocated to coordinating with office staff to make sure various things are completed as part of the day-to-day office management responsibilities. Given all this, it is worth pointing out that not every detail can be remembered and not everything discussed is considered relevant, significant, or noteworthy.   This note is primarily written for myself utilizing my own customized templates so I can recall what happened during that visit as well as written in mind for other healthcare

## 2021-03-09 NOTE — TELEPHONE ENCOUNTER
Is he asking to have an x-ray without being seen for it? I've only seen him one time and that was back in January and that was for something else. He can reschedule for an earlier appointment b/c anyways we need to discuss about his issues with diabetes as well. Please try and schedule preferably for either Thurs or Fri this week. My schedule is almost full for tomorrow. Armando Han

## 2021-03-10 ENCOUNTER — OFFICE VISIT (OUTPATIENT)
Dept: FAMILY MEDICINE CLINIC | Age: 56
End: 2021-03-10
Payer: COMMERCIAL

## 2021-03-10 VITALS
SYSTOLIC BLOOD PRESSURE: 110 MMHG | DIASTOLIC BLOOD PRESSURE: 62 MMHG | HEART RATE: 85 BPM | BODY MASS INDEX: 27.59 KG/M2 | WEIGHT: 203.4 LBS | TEMPERATURE: 97.1 F | OXYGEN SATURATION: 99 %

## 2021-03-10 DIAGNOSIS — M54.12 CERVICAL RADICULOPATHY: ICD-10-CM

## 2021-03-10 DIAGNOSIS — E11.49 OTHER DIABETIC NEUROLOGICAL COMPLICATION ASSOCIATED WITH TYPE 2 DIABETES MELLITUS (HCC): ICD-10-CM

## 2021-03-10 DIAGNOSIS — M79.642 LEFT HAND PAIN: ICD-10-CM

## 2021-03-10 DIAGNOSIS — Z56.6 STRESS AT WORK: ICD-10-CM

## 2021-03-10 DIAGNOSIS — E11.9 TYPE 2 DIABETES MELLITUS WITHOUT COMPLICATION, WITHOUT LONG-TERM CURRENT USE OF INSULIN (HCC): Primary | ICD-10-CM

## 2021-03-10 PROCEDURE — 99214 OFFICE O/P EST MOD 30 MIN: CPT | Performed by: FAMILY MEDICINE

## 2021-03-10 RX ORDER — MELOXICAM 15 MG/1
15 TABLET ORAL DAILY PRN
Qty: 30 TABLET | Refills: 2 | Status: SHIPPED | OUTPATIENT
Start: 2021-03-10 | End: 2021-08-02 | Stop reason: SDUPTHER

## 2021-03-10 RX ORDER — DULAGLUTIDE 3 MG/.5ML
3 INJECTION, SOLUTION SUBCUTANEOUS WEEKLY
Qty: 4 PEN | Refills: 1 | Status: SHIPPED | OUTPATIENT
Start: 2021-03-10 | End: 2021-04-23

## 2021-03-10 RX ORDER — GABAPENTIN 300 MG/1
300 CAPSULE ORAL 2 TIMES DAILY
Qty: 60 CAPSULE | Refills: 0 | Status: SHIPPED | OUTPATIENT
Start: 2021-03-10 | End: 2021-04-23

## 2021-03-10 SDOH — HEALTH STABILITY - MENTAL HEALTH: OTHER PHYSICAL AND MENTAL STRAIN RELATED TO WORK: Z56.6

## 2021-03-11 ASSESSMENT — ENCOUNTER SYMPTOMS
NAUSEA: 0
SINUS PRESSURE: 0
BLOOD IN STOOL: 0
COUGH: 0
WHEEZING: 0
SHORTNESS OF BREATH: 0
TROUBLE SWALLOWING: 0
ABDOMINAL PAIN: 0
VOMITING: 0
DIARRHEA: 0
RHINORRHEA: 0
CHEST TIGHTNESS: 0
ABDOMINAL DISTENTION: 0
CONSTIPATION: 0
BACK PAIN: 0

## 2021-04-05 ENCOUNTER — HOSPITAL ENCOUNTER (OUTPATIENT)
Dept: GENERAL RADIOLOGY | Age: 56
Discharge: HOME | End: 2021-04-05
Payer: COMMERCIAL

## 2021-04-05 DIAGNOSIS — M54.12 CERVICAL RADICULOPATHY: ICD-10-CM

## 2021-04-05 PROCEDURE — 72050 X-RAY EXAM NECK SPINE 4/5VWS: CPT

## 2021-04-26 DIAGNOSIS — E11.9 TYPE 2 DIABETES MELLITUS WITHOUT COMPLICATION, WITHOUT LONG-TERM CURRENT USE OF INSULIN (HCC): ICD-10-CM

## 2021-04-26 RX ORDER — BLOOD-GLUCOSE TRANSMITTER
EACH MISCELLANEOUS
COMMUNITY
Start: 2021-03-30 | End: 2021-04-26 | Stop reason: SDUPTHER

## 2021-04-26 RX ORDER — BLOOD-GLUCOSE TRANSMITTER
EACH MISCELLANEOUS
Qty: 1 EACH | Refills: 3 | Status: SHIPPED | OUTPATIENT
Start: 2021-04-26 | End: 2022-05-11 | Stop reason: SDUPTHER

## 2021-05-03 ENCOUNTER — TELEPHONE (OUTPATIENT)
Dept: FAMILY MEDICINE CLINIC | Age: 56
End: 2021-05-03

## 2021-05-03 DIAGNOSIS — M25.78 OSTEOPHYTE OF CERVICAL SPINE: ICD-10-CM

## 2021-05-03 DIAGNOSIS — E11.65 UNCONTROLLED TYPE 2 DIABETES MELLITUS WITH HYPERGLYCEMIA (HCC): Primary | ICD-10-CM

## 2021-05-03 DIAGNOSIS — M54.12 CERVICAL RADICULOPATHY: ICD-10-CM

## 2021-07-28 ENCOUNTER — TELEPHONE (OUTPATIENT)
Dept: FAMILY MEDICINE CLINIC | Age: 56
End: 2021-07-28

## 2021-07-28 NOTE — TELEPHONE ENCOUNTER
My schedule is wide open next week or thereafter. I can't write a letter for someone whom I haven't seen in several months. Armando Reyes 177

## 2021-07-28 NOTE — TELEPHONE ENCOUNTER
Scheduled pt OV for Monday. Pt is unable to come in any sooner but stated that his job is on the line. Pt would like a letter stating that he does have an upcoming appointment with us to give to his employer and sent back to him via [address] Olegario Louis for letter?

## 2021-07-28 NOTE — TELEPHONE ENCOUNTER
----- Message from Jessy Stoddard sent at 7/28/2021 12:37 PM EDT -----  Subject: Appointment Request    Reason for Call: Routine Existing Condition Follow Up    QUESTIONS  Type of Appointment? Established Patient  Reason for appointment request? Available appointments did not meet   patient need  Additional Information for Provider? pt is requesting a letter for work   regarding having to be on light duty for back/spinal issues as well as is   wanting to likelihood of having meds called in; offered to sched pt for   7/29 but will not for his work schedule  ---------------------------------------------------------------------------  --------------  [address]  What is the best way for the office to contact you? OK to leave message on   voicemail  Preferred Call Back Phone Number? [phone]  ---------------------------------------------------------------------------  --------------  SCRIPT ANSWERS  Relationship to Patient? Self  (Is the patient requesting to be seen urgently for their symptoms?)? No  Is this follow up request related to routine Diabetes Management? No  Are you having any new concerns about your existing condition? No  Have you been diagnosed with, awaiting test results for, or told that you   are suspected of having COVID-19 (Coronavirus)? (If patient has tested   negative or was tested as a requirement for work, school, or travel and   not based on symptoms, answer no)? No  Do you currently have flu-like symptoms including fever or chills, cough,   shortness of breath, difficulty breathing, or new loss of taste or smell? No  Have you had close contact with someone with COVID-19 in the last 14 days? No  (Service Expert  click yes below to proceed with [employer] As Usual   Scheduling)?  Yes

## 2021-07-31 NOTE — PROGRESS NOTES
Carmen Pike   49 y.o. male   1965    HPI:    Patient was last seen by me on 3/10/2021. Reason(s) for visit:   Chief Complaint   Patient presents with    Letter for School/Work     Pt stated that he spoke with his manager who put him in a less strenuous job but they recently put him back in his old position. Pt would like a note from PCP putting him on light duty.  Back Pain    Pain     Nerve pain.  Medication Refill    Diabetes     Pt stated that sugars through the middle of the night are either high or low. Mood swings. Hunger. Chronic arthritis:  Patient main reason for visit is regarding work related stress and request be placed on \"light duty. \"  He stated that he has been with his current company for about a year. He stated that he's unable to do his job because very physically stressful and demanding. Currently working 6:30a-3p M-F and occasionally on weekends. His work has high turn around. He's working in [employer] work environment. 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 He stated that he feels his back pop when trying to do this process, especially when trying to pull the carpet out. 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 He stated that he's capable of working 8 hours 5 days a week. When asked about other work responsibilities/roles, he stated that each position requires being interviewed. He stated that one of his co-workers was hit by a forklift and still making her work. He stated that he's suppose to be given 20 min breaks and he usually does. Regarding stress outside of work, he stated that he has had some stress with being a parent. His 3 children are not of driving age. Type II DM:  -Patient stated that his glucose is running around 400's and \"craving sugar. \"  He admitted to having a poor diet. -Reported of numbness and paresthesia of his feet, but not so much his hands      Allergies   Allergen Reactions    Indomethacin      diarrhea       Current Outpatient Medications on File Prior to Visit   Medication Sig Dispense Refill    Continuous Blood Gluc Transmit (DEXCOM G6 TRANSMITTER) MISC CHANGE EVERY 3 MONTHS. 1 each 3    diclofenac sodium (VOLTAREN) 1 % GEL Apply 2 g topically every 6 hours as needed for Pain (back pain) 150 g 2    Continuous Blood Gluc Sensor (DEXCOM G6 SENSOR) MISC Change every 10 days. 1 each 11     No current facility-administered medications on file prior to visit.         Family History   Problem Relation Age of Onset    Cancer Father 39        colon    Diabetes Father     Alzheimer's Disease Father     Diabetes Mother     Diabetes Maternal Aunt     Cancer Sister         breast       Social History     Tobacco Use    Smoking status: Never Smoker    Smokeless tobacco: Never Used   Substance Use Topics    Alcohol use: No     Alcohol/week: 0.0 standard drinks        Lab Results   Component Value Date    WBC 11.4 (H) 06/23/2015    HGB 15.3 06/23/2015    HCT 47.0 06/23/2015    MCV 89.5 06/23/2015     06/23/2015       Chemistry        Component Value Date/Time     01/19/2021 1545    K 4.3 01/19/2021 1545    CL 99 01/19/2021 1545    CO2 27 01/19/2021 1545    BUN 18 01/19/2021 1545    CREATININE 1.2 01/19/2021 1545        Component Value Date/Time    CALCIUM 9.6 01/19/2021 1545    ALKPHOS 117 06/17/2020 1221    AST 20 06/17/2020 1221    ALT 41 (H) 06/17/2020 1221    BILITOT 0.7 06/17/2020 1221          Lab Results   Component Value Date    ALT 41 (H) 06/17/2020    AST 20 06/17/2020    ALKPHOS 117 06/17/2020    BILITOT 0.7 06/17/2020     Lab Results   Component Value Date    LABA1C 10.5 01/19/2021     Lab Results   Component Value Date    .7 01/19/2021       Review of Systems   Constitutional: Negative for activity change, appetite change, Rhythm: Normal rate and regular rhythm. Pulmonary:      Effort: Pulmonary effort is normal. No respiratory distress. Breath sounds: No wheezing, rhonchi or rales. Abdominal:      General: Abdomen is flat. There is no distension. Palpations: Abdomen is soft. Musculoskeletal:         General: No deformity. Normal range of motion. Cervical back: Normal range of motion. No erythema. Right lower leg: No edema. Left lower leg: No edema. Skin:     Coloration: Skin is not cyanotic, jaundiced or pale. Findings: No abrasion, abscess, bruising, ecchymosis, erythema, signs of injury, laceration, lesion, petechiae, rash or wound. Neurological:      General: No focal deficit present. Mental Status: He is alert. Mental status is at baseline. GCS: GCS eye subscore is 4. GCS verbal subscore is 5. GCS motor subscore is 6. Cranial Nerves: No cranial nerve deficit, dysarthria or facial asymmetry. Motor: No weakness, tremor, atrophy or seizure activity. Coordination: Coordination normal.      Gait: Gait is intact. Psychiatric:         Attention and Perception: Attention and perception normal.         Mood and Affect: Mood and affect normal.         Speech: Speech normal.         Behavior: Behavior normal. Behavior is cooperative. Thought Content: Thought content normal.        Assessment/Plan:   Adenike Conn was seen today for letter for school/work, back pain, pain, medication refill and diabetes. Diagnoses and all orders for this visit:    Encounter for completion of form with patient  Comments:  Work restriction for 3 months effective 8/3/2021. Form completed with original form gven to patient. Stressful workplace    Uncontrolled type 2 diabetes mellitus with hyperglycemia ([facility] [address]  Comments:  I discussed with patient that having diabetes requires a major lifestyle change.   Extensive counseling was given to the patient, who was informed of the microvascular and MG tablet; Take 1 tablet by mouth 2 times daily for 30 days. Left hand pain  Comments:  Informed patient that he is at risk of developing CTS. Orders:  -     meloxicam (MOBIC) 15 MG tablet; Take 1 tablet by mouth daily as needed for Pain (arthritis)      I reviewed the plan of care with the patient. Patient acknowledged understanding and agreed with plan of care overall. Medications Discontinued During This Encounter   Medication Reason    TRULICITY 3 NI/7.7CZ SOPN DOSE ADJUSTMENT    dapagliflozin (FARXIGA) 10 MG tablet Alternate therapy    metFORMIN (GLUCOPHAGE) 500 MG tablet Alternate therapy    gabapentin (NEURONTIN) 300 MG capsule DOSE ADJUSTMENT    meloxicam (MOBIC) 15 MG tablet REORDER        General information on medications:  -When it comes to medications, whether with starting or adding a new medication or increasing the dose of a current medication, the benefits and risks have to always be considered and weighed over, especially if one is taking other medications as well. -There are no medications that have no side effects and that there is always a risk involved with taking a medication.    -If a side effect were to occur with starting a new medication or with increasing the dose of a current medication that either the medication can be totally discontinued altogether or simply decrease the dose of it and if this would be the case a follow-up appointment would be deemed necessary.    -The drug allergy list will then be updated with the corresponding side effect(s) if it's deemed to be a true 'drug allergy'.     -The most common adverse effects of medication(s) were addressed at today's visit.    -Lastly, the coverage status of a medication may vary from insurance to insurance and the only way to verify if the medication is covered is to send an actual prescription in.    -The drug formulary of each insurance changes without any warning or notification to the healthcare provider let alone the pharmacy.  -The cost of medications vary from insurance to insurance and the cost is always subject to change just like the drug formulary. Level of service (LOS):   -Multiple variables/factors are considered with determining LOS: duration of time spent with patient, time spent reviewing patient's labs, notes (including my own and other specialist's notes if relevant), number of issues addressed during the visit, answering any questions/concerns brought up by the patient, and the overall complexity and decision making regarding the issues addressed during the visit, etc.  -Length of visit is one factor (not the main one) in determining the LOS code selected. -There are different codes to distinguish new patient vs old (established) patient.  -There is a specific LOS/code for a (annual) physical (otherwise known as a wellness visit or biometric screening) can only be used once a year. In addition, the healthcare provider will determine whether the current visit can be considered a 'physical.' Discussing about specific issues/concerns, especially if those issues/concerns are new does not constitute as a 'physical' visit. Follow-up: Return in about 4 weeks (around 8/30/2021) for IP - diabetes check up. .     Patient was informed that if his or her symptoms worsen to follow up with me sooner or go to the nearest ER if the symptoms are very significant and warrant higher level of care. Regarding my note:  -This note was composed (by me only and not with assistance via a scribe) to the best of my knowledge and recollection of the encounter with the patient using one of my own customized note templates utilizing a combination of typing and dictating with the [address] [place] [address] speech recognition software.   As a result, the note may possibly contain various errors (e.g. spelling, grammar, and non-sensible words/phrases/statements) despite reviewing the note prior to signing it for completion.    -It is worth noting that most of the time, progress notes are completed usually long after the patient was seen. Every effort is made to have notes as well as other things that needed to be attended to (e.g. medication refills, MyChart messages, documents that require reviewing, etc.) be addressed and completed in a timely fashion (ideally within 72 hours of the patient encounter). -It is also worth noting that healthcare providers often see several patients a day (e.g. > 15-30 patients/day) in either the outpatient and/or inpatient setting(s). In addition, healthcare providers spend a significant amount of time reviewing multiple patients' charts in preparation for their future encounters (pre-charting) as well as time spent reviewing any labs, imaging, specialist's notes (if relevant), and other documents (e.g. recent ER visits or hospital stays or completing forms such as FMLA, short-term/long-term disability), etc.   -Time is also allocated to attending to patient's messages on [employer], phone calls from various people, attending to prescription refills/orders, and also attending meetings or conferences throughout the day. Time and effort is also allocated to coordinating with office staff to make sure various things are completed as part of the day-to-day office management responsibilities. For the most part, substantial portion of each given day is usually spent with direct patient care followed by documenting.  -Given all this, it is worth pointing out that not every detail of the encounter can be remembered and not everything discussed is considered relevant, significant, or noteworthy. It is also worth mentioning that my recollection of the visit may differ from the respective patient's recollection of the visit.   This note is primarily written for myself (the healthcare provider) utilizing one of my own customized templates so I can recall what happened during that visit and may be used for future reference for myself to prepare for follow up appointments. My note is also written in mind for other healthcare professionals (who have their own extensive medical background and experience) for their own understanding (of what happened during the visit) and also more importantly to hopefully help influence and play a role in formulating their plan of care along with their own unique medical expertise. If one has any questions or concerns about my given note, please take into consideration all these aforementioned things before contacting. Armando Connell M.D.   [address] [facility]    Electronically signed by Kaila Rivas M.D. on 8/2/2021 at 11:23 PM.

## 2021-08-02 ENCOUNTER — OFFICE VISIT (OUTPATIENT)
Dept: FAMILY MEDICINE CLINIC | Age: 56
End: 2021-08-02
Payer: COMMERCIAL

## 2021-08-02 VITALS
TEMPERATURE: 98.1 F | OXYGEN SATURATION: 99 % | WEIGHT: 209 LBS | SYSTOLIC BLOOD PRESSURE: 110 MMHG | BODY MASS INDEX: 28.35 KG/M2 | HEART RATE: 110 BPM | DIASTOLIC BLOOD PRESSURE: 82 MMHG

## 2021-08-02 DIAGNOSIS — M79.642 LEFT HAND PAIN: ICD-10-CM

## 2021-08-02 DIAGNOSIS — E11.65 UNCONTROLLED TYPE 2 DIABETES MELLITUS WITH HYPERGLYCEMIA (HCC): ICD-10-CM

## 2021-08-02 DIAGNOSIS — Z02.89 ENCOUNTER FOR COMPLETION OF FORM WITH PATIENT: Primary | ICD-10-CM

## 2021-08-02 DIAGNOSIS — R73.09 HEMOGLOBIN A1C GREATER THAN 9%, INDICATING POOR DIABETIC CONTROL: ICD-10-CM

## 2021-08-02 DIAGNOSIS — E11.49 OTHER DIABETIC NEUROLOGICAL COMPLICATION ASSOCIATED WITH TYPE 2 DIABETES MELLITUS (HCC): ICD-10-CM

## 2021-08-02 DIAGNOSIS — M54.12 CERVICAL RADICULOPATHY: ICD-10-CM

## 2021-08-02 DIAGNOSIS — Z56.6 STRESSFUL WORKPLACE: ICD-10-CM

## 2021-08-02 PROBLEM — M54.2 NECK PAIN: Status: ACTIVE | Noted: 2017-04-26

## 2021-08-02 PROBLEM — G56.02 CARPAL TUNNEL SYNDROME OF LEFT WRIST: Status: ACTIVE | Noted: 2017-04-27

## 2021-08-02 PROBLEM — M47.22 OSTEOARTHRITIS OF SPINE WITH RADICULOPATHY, CERVICAL REGION: Status: ACTIVE | Noted: 2017-04-27

## 2021-08-02 PROCEDURE — 99214 OFFICE O/P EST MOD 30 MIN: CPT | Performed by: FAMILY MEDICINE

## 2021-08-02 RX ORDER — GABAPENTIN 300 MG/1
CAPSULE ORAL
Qty: 60 CAPSULE | Refills: 0 | Status: CANCELLED | OUTPATIENT
Start: 2021-08-02 | End: 2021-09-01

## 2021-08-02 RX ORDER — GLIMEPIRIDE 4 MG/1
4 TABLET ORAL 2 TIMES DAILY
Qty: 180 TABLET | Refills: 0 | Status: SHIPPED | OUTPATIENT
Start: 2021-08-02 | End: 2021-12-08 | Stop reason: SDUPTHER

## 2021-08-02 RX ORDER — DULAGLUTIDE 4.5 MG/.5ML
4.5 INJECTION, SOLUTION SUBCUTANEOUS WEEKLY
Qty: 4 PEN | Refills: 2 | Status: SHIPPED | OUTPATIENT
Start: 2021-08-02 | End: 2021-12-08 | Stop reason: SDUPTHER

## 2021-08-02 RX ORDER — MELOXICAM 15 MG/1
15 TABLET ORAL DAILY PRN
Qty: 30 TABLET | Refills: 2 | Status: SHIPPED | OUTPATIENT
Start: 2021-08-02 | End: 2022-01-05 | Stop reason: SDUPTHER

## 2021-08-02 RX ORDER — PIOGLITAZONEHYDROCHLORIDE 30 MG/1
30 TABLET ORAL DAILY
Qty: 90 TABLET | Refills: 0 | Status: SHIPPED | OUTPATIENT
Start: 2021-08-02 | End: 2021-08-30 | Stop reason: DRUGHIGH

## 2021-08-02 RX ORDER — GABAPENTIN 600 MG/1
600 TABLET ORAL 2 TIMES DAILY
Qty: 60 TABLET | Refills: 0 | Status: SHIPPED | OUTPATIENT
Start: 2021-08-02 | End: 2021-08-30 | Stop reason: SDUPTHER

## 2021-08-02 SDOH — HEALTH STABILITY - MENTAL HEALTH: OTHER PHYSICAL AND MENTAL STRAIN RELATED TO WORK: Z56.6

## 2021-08-02 ASSESSMENT — ENCOUNTER SYMPTOMS
COUGH: 0
BLOOD IN STOOL: 0
DIARRHEA: 0
BACK PAIN: 0
RHINORRHEA: 0
ABDOMINAL DISTENTION: 0
CHEST TIGHTNESS: 0
CONSTIPATION: 0
ABDOMINAL PAIN: 0
TROUBLE SWALLOWING: 0
SHORTNESS OF BREATH: 0
VOMITING: 0
NAUSEA: 0
WHEEZING: 0
SINUS PRESSURE: 0

## 2021-08-02 NOTE — LETTER
[address] [facility]  [address]  Phone: [phone]  Fax: [phone]    Dacia Nichols MD        August 2, 2021     Patient: Derrick Sinclair   YOB: 1965   Date of Visit: 8/2/2021       To Whom It May Concern: It is my medical opinion that Derrick Sinclair may return to work on 8/3/2021 on light duty for 3 months. 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 If you have any questions or concerns, please don't hesitate to call.     Sincerely,        Dacia Nichols MD

## 2021-08-03 DIAGNOSIS — E11.65 UNCONTROLLED TYPE 2 DIABETES MELLITUS WITH HYPERGLYCEMIA (HCC): ICD-10-CM

## 2021-08-03 LAB
ALBUMIN SERPL-MCNC: 4.4 G/DL (ref 3.4–5)
ANION GAP SERPL CALCULATED.3IONS-SCNC: 13 MMOL/L (ref 3–16)
BUN BLDV-MCNC: 17 MG/DL (ref 7–20)
CALCIUM SERPL-MCNC: 9.3 MG/DL (ref 8.3–10.6)
CHLORIDE BLD-SCNC: 102 MMOL/L (ref 99–110)
CO2: 23 MMOL/L (ref 21–32)
CREAT SERPL-MCNC: 1.2 MG/DL (ref 0.9–1.3)
GFR AFRICAN AMERICAN: >60
GFR NON-AFRICAN AMERICAN: >60
GLUCOSE BLD-MCNC: 167 MG/DL (ref 70–99)
PHOSPHORUS: 4.1 MG/DL (ref 2.5–4.9)
POTASSIUM SERPL-SCNC: 4.6 MMOL/L (ref 3.5–5.1)
SODIUM BLD-SCNC: 138 MMOL/L (ref 136–145)

## 2021-08-04 LAB
ESTIMATED AVERAGE GLUCOSE: 231.7 MG/DL
HBA1C MFR BLD: 9.7 %

## 2021-08-25 NOTE — PROGRESS NOTES
Katherin Neely   49 y.o. male   1965    HPI:    Patient was last seen by me on 8/2/2021. During our last office visit:   -Placed patient on work restriction because of issues of cervical radiculopathy and chronic left hand pain for 3 months effective 8/3/2021. Form completed with original form gven to patient  -Started patient on glimepiride 4 mg twice daily and pioglitazone 30 mg daily because of report of hyperglycemia 400s. Increased patient's Trulicity from 3 mg daily to 4.5 mg daily. Reason(s) for visit:   Chief Complaint   Patient presents with    Diabetes    Follow-up    Medication Refill       Type II DM neuropathy:  -FBS - 150-175  -No episodes of hypoglycemia  -No side effects  -Neuropathy - has improvement with increasing Gabapentin, but still an issue. Denied overnight problems. Denied daytime drowsiness on Gabapentin. Cervical radiculopathy:  -Waiting to get MRI once his new insurance kicks in 60 days.  -Stable. 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 -RLS issue? Allergies   Allergen Reactions    Indomethacin      diarrhea       Current Outpatient Medications on File Prior to Visit   Medication Sig Dispense Refill    meloxicam (MOBIC) 15 MG tablet Take 1 tablet by mouth daily as needed for Pain (arthritis) 30 tablet 2    Dulaglutide (TRULICITY) 4.5 RL/8.5OU SOPN Inject 4.5 mg into the skin once a week 4 pen 2    glimepiride (AMARYL) 4 MG tablet Take 1 tablet by mouth 2 times daily 180 tablet 0    Continuous Blood Gluc Transmit (DEXCOM G6 TRANSMITTER) MISC CHANGE EVERY 3 MONTHS. 1 each 3    diclofenac sodium (VOLTAREN) 1 % GEL Apply 2 g topically every 6 hours as needed for Pain (back pain) 150 g 2    Continuous Blood Gluc Sensor (DEXCOM G6 SENSOR) MISC Change every 10 days. 1 each 11     No current facility-administered medications on file prior to visit. Family History   Problem Relation Age of Onset    Cancer Father 39        colon    Diabetes Father     Alzheimer's Disease Father     Diabetes Mother     Diabetes Maternal Aunt     Cancer Sister         breast       Social History     Tobacco Use    Smoking status: Never Smoker    Smokeless tobacco: Never Used   Substance Use Topics    Alcohol use: No     Alcohol/week: 0.0 standard drinks        Lab Results   Component Value Date    WBC 11.4 (H) 06/23/2015    HGB 15.3 06/23/2015    HCT 47.0 06/23/2015    MCV 89.5 06/23/2015     06/23/2015       Chemistry        Component Value Date/Time     08/03/2021 1543    K 4.6 08/03/2021 1543     08/03/2021 1543    CO2 23 08/03/2021 1543    BUN 17 08/03/2021 1543    CREATININE 1.2 08/03/2021 1543        Component Value Date/Time    CALCIUM 9.3 08/03/2021 1543    ALKPHOS 117 06/17/2020 1221    AST 20 06/17/2020 1221    ALT 41 (H) 06/17/2020 1221    BILITOT 0.7 06/17/2020 1221          Lab Results   Component Value Date    ALT 41 (H) 06/17/2020    AST 20 06/17/2020    ALKPHOS 117 06/17/2020    BILITOT 0.7 06/17/2020     Lab Results   Component Value Date    LABA1C 9.7 08/03/2021     Lab Results   Component Value Date    .7 08/03/2021       Review of Systems   Constitutional: Negative for activity change, appetite change, fatigue, fever and unexpected weight change. HENT: Negative for congestion, rhinorrhea, sinus pressure and trouble swallowing. Respiratory: Negative for cough, chest tightness, shortness of breath and wheezing. Cardiovascular: Negative for chest pain, palpitations and leg swelling. Gastrointestinal: Negative for abdominal distention, abdominal pain, blood in stool, constipation, diarrhea, nausea and vomiting. Genitourinary: Negative for dysuria, frequency and hematuria. Musculoskeletal: Negative for arthralgias and back pain. Skin: Negative for rash.    Neurological: Negative for dizziness, weakness, light-headedness, numbness and headaches. Wt Readings from Last 3 Encounters:   08/30/21 207 lb (93.9 kg)   08/02/21 209 lb (94.8 kg)   03/10/21 203 lb 6.4 oz (92.3 kg)       BP Readings from Last 3 Encounters:   08/30/21 110/62   08/02/21 110/82   03/10/21 110/62       Pulse Readings from Last 3 Encounters:   08/30/21 97   08/02/21 110   03/10/21 85       /62   Pulse 97   Temp 97.7 °F (36.5 °C)   Wt 207 lb (93.9 kg)   SpO2 98%   BMI 28.07 kg/m²      Physical Exam  Vitals reviewed. Constitutional:       General: He is awake. He is not in acute distress. Appearance: He is overweight. He is not ill-appearing or diaphoretic. HENT:      Head: Normocephalic and atraumatic. No abrasion or masses. Hair is normal.      Right Ear: External ear normal.      Left Ear: External ear normal.      Nose: Nose normal.   Eyes:      General: Lids are normal. Gaze aligned appropriately. No scleral icterus. Right eye: No discharge. Left eye: No discharge. Extraocular Movements: Extraocular movements intact. Conjunctiva/sclera: Conjunctivae normal.   Neck:      Trachea: Phonation normal.   Cardiovascular:      Rate and Rhythm: Normal rate and regular rhythm. Pulmonary:      Effort: Pulmonary effort is normal. No respiratory distress. Breath sounds: No wheezing, rhonchi or rales. Abdominal:      General: Abdomen is flat. There is no distension. Palpations: Abdomen is soft. Musculoskeletal:         General: No deformity. Normal range of motion. Cervical back: Normal range of motion. No erythema. Right lower leg: No edema. Left lower leg: No edema. Skin:     Coloration: Skin is not cyanotic, jaundiced or pale. Findings: No abrasion, abscess, bruising, ecchymosis, erythema, signs of injury, laceration, lesion, petechiae, rash or wound. Neurological:      General: No focal deficit present. Mental Status: He is alert. Mental status is at baseline. GCS: GCS eye subscore is 4. GCS verbal subscore is 5. GCS motor subscore is 6. Cranial Nerves: No cranial nerve deficit, dysarthria or facial asymmetry. Motor: No weakness, tremor, atrophy or seizure activity. Coordination: Coordination normal.      Gait: Gait is intact. Psychiatric:         Attention and Perception: Attention and perception normal.         Mood and Affect: Mood and affect normal.         Speech: Speech normal.         Behavior: Behavior normal. Behavior is cooperative. Thought Content: Thought content normal.       Assessment/Plan:   Phillip Archuleta was seen today for diabetes, follow-up and medication refill. Diagnoses and all orders for this visit:    Uncontrolled type 2 diabetes mellitus with hyperglycemia ([facility] [address]  Comments: Will max out all his current meds. I discussed with patient that having diabetes requires a major lifestyle change. Extensive counseling was given to the patient, who was informed of the microvascular and macrovascular complications of diabetes: increased risk of CAD, MI, CVA, CKD/ESRD (leading to dialysis), diabetic retinopathy, diabetic gastroparesis, diabetic neuropathy, chronic wound infections (eg osteomyelitis), etc.      Patient was informed that diabetics need to be seen every 3 months for routine A1c blood testing and that the goal of A1c is under 7.0 (and under 6.5 for more aggressive treatment) for most patients and under 8.5 for the elderly. Patient was counseled also to take the medications as prescribed and to check glucose as directed, especially if they're symptomatic (malaise, weak, fatigue, clammy, dizzy, etc.) and to keep food/beverage with him at all times in case his glucose is low. Patient was also advised, especially in the setting of severe neuropathy with numbness to never walk around barefoot.     Informed patient of benefits of being on GLP1 & SGLT2 therapy that go beyond glycemic control - low risk of hypoglycemia, weight loss, decreased risk of cardiovascular complications, etc.  Orders:  -     gabapentin (NEURONTIN) 600 MG tablet; Take 1 tablet by mouth 3 times daily for 30 days. -     pioglitazone (ACTOS) 45 MG tablet; Take 1 tablet by mouth every morning  -     Dapagliflozin-metFORMIN HCl ER 5-1000 MG TB24; Take 1 tablet PO BID    Hemoglobin A1C greater than 9%, indicating poor diabetic control    Cervical radiculopathy  -     gabapentin (NEURONTIN) 600 MG tablet; Take 1 tablet by mouth 3 times daily for 30 days. Other diabetic neurological complication associated with type 2 diabetes mellitus (HCC)  -     gabapentin (NEURONTIN) 600 MG tablet; Take 1 tablet by mouth 3 times daily for 30 days. Restless legs syndrome (RLS)  -     gabapentin (NEURONTIN) 600 MG tablet; Take 1 tablet by mouth 3 times daily for 30 days. I reviewed the plan of care with the patient. Patient acknowledged understanding and agreed with plan of care overall. Medications Discontinued During This Encounter   Medication Reason    Dapagliflozin-metFORMIN HCl ER 5-500 MG TB24 DOSE ADJUSTMENT    pioglitazone (ACTOS) 30 MG tablet DOSE ADJUSTMENT    gabapentin (NEURONTIN) 600 MG tablet REORDER        General information on medications:  -When it comes to medications, whether with starting or adding a new medication or increasing the dose of a current medication, the benefits and risks have to always be considered and weighed over, especially if one is taking other medications as well.    -There are no medications that have no side effects and that there is always a risk involved with taking a medication.    -If a side effect were to occur with starting a new medication or with increasing the dose of a current medication that either the medication can be totally discontinued altogether or simply decrease the dose of it and if this would be the case a follow-up appointment would be deemed necessary.    -The drug allergy list will then be updated with the corresponding side effect(s) if it's deemed to be a true 'drug allergy'. -The most common adverse effects of medication(s) were addressed at today's visit.    -Lastly, the coverage status of a medication may vary from insurance to insurance and the only way to verify if the medication is covered is to send an actual prescription in.    -The drug formulary of each insurance changes without any warning or notification to the healthcare provider let alone the pharmacy.  -The cost of medications vary from insurance to insurance and the cost is always subject to change just like the drug formulary. Level of service (LOS):   -Multiple variables/factors are considered with determining LOS: duration of time spent with patient, time spent reviewing patient's labs, notes (including my own and other specialist's notes if relevant), number of issues addressed during the visit, answering any questions/concerns brought up by the patient, and the overall complexity and decision making regarding the issues addressed during the visit, etc.  -Length of visit is one factor (not the main one) in determining the LOS code selected. -There are different codes to distinguish new patient vs old (established) patient.  -There is a specific LOS/code for a (annual) physical (otherwise known as a wellness visit or biometric screening) can only be used once a year. In addition, the healthcare provider will determine whether the current visit can be considered a 'physical.' Discussing about specific issues/concerns, especially if those issues/concerns are new does not constitute as a 'physical' visit. Follow-up: Return in about 11 weeks (around 11/15/2021) for DM routine 3 mo visit. .     Patient was informed that if his or her symptoms worsen to follow up with me sooner or go to the nearest ER if the symptoms are very significant and warrant higher level of care.     Regarding my note:  -This note was composed (by me only and not with assistance via a scribe) to the best of my knowledge and recollection of the encounter with the patient using one of my own customized note templates utilizing a combination of typing and dictating with the [address] [id number] speech recognition software. As a result, the note may possibly contain various errors (e.g. spelling, grammar, and non-sensible words/phrases/statements) despite reviewing the note prior to signing it for completion.    -It is worth noting that most of the time, progress notes are completed usually long after the patient was seen. Every effort is made to have notes as well as other things that needed to be attended to (e.g. medication refills, MyChart messages, documents that require reviewing, etc.) be addressed and completed in a timely fashion (ideally within 72 hours of the patient encounter). -It is also worth noting that healthcare providers often see several patients a day (e.g. > 15-30 patients/day) in either the outpatient and/or inpatient setting(s). In addition, healthcare providers spend a significant amount of time reviewing multiple patients' charts in preparation for their future encounters (pre-charting) as well as time spent reviewing any labs, imaging, specialist's notes (if relevant), and other documents (e.g. recent ER visits or hospital stays or completing forms such as FMLA, short-term/long-term disability), etc.   -Time is also allocated to attending to patient's messages on [employer], phone calls from various people, attending to prescription refills/orders, and also attending meetings or conferences throughout the day. Time and effort is also allocated to coordinating with office staff to make sure various things are completed as part of the day-to-day office management responsibilities.   For the most part, substantial portion of each given day is usually spent with direct patient care followed by documenting.  -Given all this, it is worth pointing out that not every detail of the encounter can be remembered and not everything discussed is considered relevant, significant, or noteworthy. It is also worth mentioning that my recollection of the visit may differ from the respective patient's recollection of the visit. This note is primarily written for myself (the healthcare provider) utilizing one of my own customized templates so I can recall what happened during that visit and may be used for future reference for myself to prepare for follow up appointments. My note is also written in mind for other healthcare professionals (who have their own extensive medical background and experience) for their own understanding (of what happened during the visit) and also more importantly to hopefully help influence and play a role in formulating their plan of care along with their own unique medical expertise. If one has any questions or concerns about my given note, please take into consideration all these aforementioned things before contacting. Armando Bowman M.D.   [address] [place] [address]    Electronically signed by Jamil Reid M.D. on 8/30/2021 at 4:40 PM.

## 2021-08-30 ENCOUNTER — OFFICE VISIT (OUTPATIENT)
Dept: FAMILY MEDICINE CLINIC | Age: 56
End: 2021-08-30
Payer: COMMERCIAL

## 2021-08-30 VITALS
SYSTOLIC BLOOD PRESSURE: 110 MMHG | HEART RATE: 97 BPM | BODY MASS INDEX: 28.07 KG/M2 | TEMPERATURE: 97.7 F | OXYGEN SATURATION: 98 % | WEIGHT: 207 LBS | DIASTOLIC BLOOD PRESSURE: 62 MMHG

## 2021-08-30 DIAGNOSIS — M54.12 CERVICAL RADICULOPATHY: ICD-10-CM

## 2021-08-30 DIAGNOSIS — E11.49 OTHER DIABETIC NEUROLOGICAL COMPLICATION ASSOCIATED WITH TYPE 2 DIABETES MELLITUS (HCC): ICD-10-CM

## 2021-08-30 DIAGNOSIS — R73.09 HEMOGLOBIN A1C GREATER THAN 9%, INDICATING POOR DIABETIC CONTROL: ICD-10-CM

## 2021-08-30 DIAGNOSIS — E11.65 UNCONTROLLED TYPE 2 DIABETES MELLITUS WITH HYPERGLYCEMIA (HCC): Primary | ICD-10-CM

## 2021-08-30 DIAGNOSIS — G25.81 RESTLESS LEGS SYNDROME (RLS): ICD-10-CM

## 2021-08-30 PROCEDURE — 99214 OFFICE O/P EST MOD 30 MIN: CPT | Performed by: FAMILY MEDICINE

## 2021-08-30 RX ORDER — PIOGLITAZONEHYDROCHLORIDE 45 MG/1
45 TABLET ORAL EVERY MORNING
Qty: 90 TABLET | Refills: 0 | Status: SHIPPED | OUTPATIENT
Start: 2021-08-30 | End: 2021-12-08 | Stop reason: SDUPTHER

## 2021-08-30 RX ORDER — GABAPENTIN 600 MG/1
600 TABLET ORAL 3 TIMES DAILY
Qty: 90 TABLET | Refills: 0 | Status: SHIPPED | OUTPATIENT
Start: 2021-08-30 | End: 2022-01-05 | Stop reason: SDUPTHER

## 2021-08-30 ASSESSMENT — ENCOUNTER SYMPTOMS
CHEST TIGHTNESS: 0
VOMITING: 0
NAUSEA: 0
SHORTNESS OF BREATH: 0
COUGH: 0
BLOOD IN STOOL: 0
TROUBLE SWALLOWING: 0
ABDOMINAL PAIN: 0
BACK PAIN: 0
SINUS PRESSURE: 0
ABDOMINAL DISTENTION: 0
CONSTIPATION: 0
DIARRHEA: 0
RHINORRHEA: 0
WHEEZING: 0

## 2021-12-08 ENCOUNTER — TELEPHONE (OUTPATIENT)
Dept: ORTHOPEDIC SURGERY | Age: 56
End: 2021-12-08

## 2021-12-08 ENCOUNTER — VIRTUAL VISIT (OUTPATIENT)
Dept: FAMILY MEDICINE CLINIC | Age: 56
End: 2021-12-08
Payer: COMMERCIAL

## 2021-12-08 DIAGNOSIS — A09 ACUTE INFECTIOUS DIARRHEA: ICD-10-CM

## 2021-12-08 DIAGNOSIS — E11.65 UNCONTROLLED TYPE 2 DIABETES MELLITUS WITH HYPERGLYCEMIA (HCC): ICD-10-CM

## 2021-12-08 DIAGNOSIS — Z20.822 EXPOSURE TO CONFIRMED CASE OF COVID-19: ICD-10-CM

## 2021-12-08 DIAGNOSIS — R73.09 HEMOGLOBIN A1C GREATER THAN 9%, INDICATING POOR DIABETIC CONTROL: ICD-10-CM

## 2021-12-08 DIAGNOSIS — Z71.89 EDUCATED ABOUT 2019 NOVEL CORONAVIRUS INFECTION: ICD-10-CM

## 2021-12-08 DIAGNOSIS — E87.6 HYPOKALEMIA DUE TO EXCESSIVE GASTROINTESTINAL LOSS OF POTASSIUM: ICD-10-CM

## 2021-12-08 DIAGNOSIS — R11.2 NAUSEA VOMITING AND DIARRHEA: ICD-10-CM

## 2021-12-08 DIAGNOSIS — J06.9 VIRAL URI: Primary | ICD-10-CM

## 2021-12-08 DIAGNOSIS — R19.7 NAUSEA VOMITING AND DIARRHEA: ICD-10-CM

## 2021-12-08 DIAGNOSIS — J06.9 VIRAL URI: ICD-10-CM

## 2021-12-08 PROCEDURE — 99214 OFFICE O/P EST MOD 30 MIN: CPT | Performed by: FAMILY MEDICINE

## 2021-12-08 RX ORDER — POTASSIUM CHLORIDE 20 MEQ/1
20 TABLET, EXTENDED RELEASE ORAL DAILY
Qty: 30 TABLET | Refills: 0 | Status: SHIPPED | OUTPATIENT
Start: 2021-12-08

## 2021-12-08 RX ORDER — DAPAGLIFLOZIN AND METFORMIN HYDROCHLORIDE 5; 1000 MG/1; MG/1
TABLET, FILM COATED, EXTENDED RELEASE ORAL
Qty: 180 TABLET | Refills: 0 | Status: SHIPPED | OUTPATIENT
Start: 2021-12-08

## 2021-12-08 RX ORDER — BENZONATATE 200 MG/1
200 CAPSULE ORAL EVERY 8 HOURS PRN
Qty: 15 CAPSULE | Refills: 1 | Status: SHIPPED | OUTPATIENT
Start: 2021-12-08

## 2021-12-08 RX ORDER — LOPERAMIDE HYDROCHLORIDE 2 MG/1
2 CAPSULE ORAL EVERY 6 HOURS PRN
Qty: 40 CAPSULE | Refills: 0 | Status: SHIPPED | OUTPATIENT
Start: 2021-12-08 | End: 2021-12-21

## 2021-12-08 RX ORDER — PIOGLITAZONEHYDROCHLORIDE 45 MG/1
45 TABLET ORAL EVERY MORNING
Qty: 90 TABLET | Refills: 0 | Status: SHIPPED | OUTPATIENT
Start: 2021-12-08 | End: 2022-06-07

## 2021-12-08 RX ORDER — PROCHLORPERAZINE MALEATE 10 MG
10 TABLET ORAL EVERY 8 HOURS PRN
Qty: 15 TABLET | Refills: 1 | Status: SHIPPED | OUTPATIENT
Start: 2021-12-08 | End: 2022-03-28

## 2021-12-08 RX ORDER — LIDOCAINE HYDROCHLORIDE 20 MG/ML
10 SOLUTION OROPHARYNGEAL EVERY 8 HOURS PRN
Qty: 150 ML | Refills: 0 | Status: SHIPPED | OUTPATIENT
Start: 2021-12-08 | End: 2021-12-13

## 2021-12-08 RX ORDER — GLIMEPIRIDE 4 MG/1
4 TABLET ORAL 2 TIMES DAILY
Qty: 180 TABLET | Refills: 0 | Status: SHIPPED | OUTPATIENT
Start: 2021-12-08

## 2021-12-08 RX ORDER — DULAGLUTIDE 4.5 MG/.5ML
4.5 INJECTION, SOLUTION SUBCUTANEOUS WEEKLY
Qty: 4 PEN | Refills: 2 | Status: SHIPPED | OUTPATIENT
Start: 2021-12-08

## 2021-12-08 RX ORDER — FLUTICASONE PROPIONATE 50 MCG
2 SPRAY, SUSPENSION (ML) NASAL DAILY
Qty: 16 G | Refills: 1 | Status: SHIPPED | OUTPATIENT
Start: 2021-12-08

## 2021-12-08 RX ORDER — BROMPHENIRAMINE MALEATE, PSEUDOEPHEDRINE HYDROCHLORIDE, AND DEXTROMETHORPHAN HYDROBROMIDE 2; 30; 10 MG/5ML; MG/5ML; MG/5ML
5 SYRUP ORAL EVERY 6 HOURS PRN
Qty: 118 ML | Refills: 0 | Status: SHIPPED | OUTPATIENT
Start: 2021-12-08

## 2021-12-08 RX ORDER — INSULIN DEGLUDEC INJECTION 100 U/ML
15 INJECTION, SOLUTION SUBCUTANEOUS NIGHTLY
Qty: 3 PEN | Refills: 2 | Status: SHIPPED | OUTPATIENT
Start: 2021-12-08 | End: 2022-06-10

## 2021-12-08 RX ORDER — GUAIFENESIN 1200 MG/1
TABLET, EXTENDED RELEASE ORAL
Qty: 60 TABLET | Refills: 2 | Status: SHIPPED | OUTPATIENT
Start: 2021-12-08

## 2021-12-08 ASSESSMENT — ENCOUNTER SYMPTOMS
SHORTNESS OF BREATH: 0
DIARRHEA: 1
CHEST TIGHTNESS: 0
BACK PAIN: 0
ABDOMINAL DISTENTION: 0
CONSTIPATION: 0
TROUBLE SWALLOWING: 0
SINUS PRESSURE: 1
WHEEZING: 0
VOMITING: 1
SORE THROAT: 1
VOICE CHANGE: 1
RHINORRHEA: 1
COUGH: 1
BLOOD IN STOOL: 0
NAUSEA: 1
ABDOMINAL PAIN: 1

## 2021-12-08 NOTE — PROGRESS NOTES
Wagner Bolivar   49 y.o. male   1965    Virtual visit encounter type:   [x] Doxy. me  [] Telephone w/o video  [] MyChart  [] Other: This patient was last seen by me on 8/30/2021. During our LOV:     HPI:  Chief Complaint   Patient presents with    Cough     Sx started after COVID-19 shot last Friday. Worsening Sunday. Went to urgent care on Monday and was given a z-pack.  Chills    Emesis    Covid Testing     Work requiring covid test. Pt has been off of work since Monday. Advised pt of [place] location testing. URI/LRI infection:  -Duration: 3 days   -Fever: Last episode of fever: 98.6  -Sinus pressure: yes  -Nasal congestion/rhinorrhea/post-nasal drip:   -Sore throat: yes  -SOB/wheezing: yes to both  -GI symptoms: nausea, vomiting, diarrhea  -Fatigue: extreme  -Appetite: diminished  -Current status: worsening  -Medications used: ; antibiotics - erythromycin  -Seen urgent care/ER/other clinic (prior to today's visit):  -Exposure to sick contacts: Yes - Someone in the office less than week ago has COVID-19, but was wearing a mask. He stated that everyone at work is ill  -Have you visited a nursing home/hospital/healthcare facility recently? No  -Past history of hospitalizations/ER visits (for pneumonia, wheezing/dyspnea, etc.)? Went to Urgent Care 2 days ago - strep throat - neg. Not tested for flu or COVID-19.   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 Last dose was on 12/3/2021.  -Hx of COVID-19: No hx   -Other: Has not gotten flu shot this year. Type II diabetes mellitus:  Regarding diabetes, patient is on medications as noted below in his medication list. Since the last visit. ..  -Last A1c = 9.7 (8/3/2021)  -Glucose readings (on average): 400 throughout the daytime      Allergies   Allergen Reactions    Indomethacin      diarrhea       Current Outpatient Medications on File Prior to Visit   Medication Sig Dispense Refill    gabapentin (NEURONTIN) 600 MG tablet Take 1 tablet by mouth 3 times daily for 30 days. 90 tablet 0    meloxicam (MOBIC) 15 MG tablet Take 1 tablet by mouth daily as needed for Pain (arthritis) 30 tablet 2    Continuous Blood Gluc Transmit (DEXCOM G6 TRANSMITTER) MISC CHANGE EVERY 3 MONTHS. 1 each 3    diclofenac sodium (VOLTAREN) 1 % GEL Apply 2 g topically every 6 hours as needed for Pain (back pain) 150 g 2    Continuous Blood Gluc Sensor (DEXCOM G6 SENSOR) MISC Change every 10 days. 1 each 11     No current facility-administered medications on file prior to visit. Family History   Problem Relation Age of Onset    Cancer Father 39        colon    Diabetes Father     Alzheimer's Disease Father     Diabetes Mother     Diabetes Maternal Aunt     Cancer Sister         breast       Social History     Tobacco Use    Smoking status: Never Smoker    Smokeless tobacco: Never Used   Substance Use Topics    Alcohol use: No     Alcohol/week: 0.0 standard drinks      Review of Systems   Constitutional: Positive for activity change, appetite change, chills and fatigue. Negative for fever and unexpected weight change. HENT: Positive for congestion, postnasal drip, rhinorrhea, sinus pressure, sore throat and voice change. Negative for trouble swallowing. Respiratory: Positive for cough. Negative for chest tightness, shortness of breath and wheezing. Cardiovascular: Negative for chest pain, palpitations and leg swelling. Gastrointestinal: Positive for abdominal pain, diarrhea, nausea and vomiting. Negative for abdominal distention, blood in stool and constipation. Genitourinary: Negative for dysuria, frequency and hematuria. Musculoskeletal: Positive for myalgias. Negative for arthralgias and back pain. Skin: Negative for rash. Neurological: Positive for weakness. Negative for dizziness, light-headedness, numbness and headaches. Psychiatric/Behavioral: Positive for sleep disturbance. Lab Results   Component Value Date    WBC 11.4 (H) 06/23/2015    HGB 15.3 06/23/2015    HCT 47.0 06/23/2015    MCV 89.5 06/23/2015     06/23/2015       Chemistry        Component Value Date/Time     08/03/2021 1543    K 4.6 08/03/2021 1543     08/03/2021 1543    CO2 23 08/03/2021 1543    BUN 17 08/03/2021 1543    CREATININE 1.2 08/03/2021 1543        Component Value Date/Time    CALCIUM 9.3 08/03/2021 1543    ALKPHOS 117 06/17/2020 1221    AST 20 06/17/2020 1221    ALT 41 (H) 06/17/2020 1221    BILITOT 0.7 06/17/2020 1221          Lab Results   Component Value Date    ALT 41 (H) 06/17/2020    AST 20 06/17/2020    ALKPHOS 117 06/17/2020    BILITOT 0.7 06/17/2020     Lab Results   Component Value Date    LABA1C 9.7 08/03/2021     Lab Results   Component Value Date    .7 08/03/2021       Wt Readings from Last 3 Encounters:   08/30/21 207 lb (93.9 kg)   08/02/21 209 lb (94.8 kg)   03/10/21 203 lb 6.4 oz (92.3 kg)     BP Readings from Last 3 Encounters:   08/30/21 110/62   08/02/21 110/82   03/10/21 110/62     Pulse Readings from Last 3 Encounters:   08/30/21 97   08/02/21 110   03/10/21 85       There were no vitals taken for this visit. Physical Exam  Vitals reviewed. Constitutional:       General: He is awake. He is not in acute distress. Appearance: He is overweight. He is not ill-appearing or diaphoretic. HENT:      Head: Normocephalic and atraumatic. No abrasion or masses. Hair is normal.      Right Ear: External ear normal.      Left Ear: External ear normal.      Nose: Nose normal.   Eyes:      General: Lids are normal. Gaze aligned appropriately. No scleral icterus. Right eye: No discharge. Left eye: No discharge. Extraocular Movements: Extraocular movements intact. Conjunctiva/sclera: Conjunctivae normal.   Neck:      Trachea: Phonation normal.   Cardiovascular:      Rate and Rhythm: Normal rate and regular rhythm.    Pulmonary:      Effort: Pulmonary effort is normal. No respiratory distress. Breath sounds: No wheezing, rhonchi or rales. Abdominal:      General: Abdomen is flat. There is no distension. Palpations: Abdomen is soft. Musculoskeletal:         General: No deformity. Normal range of motion. Cervical back: Normal range of motion. No erythema. Right lower leg: No edema. Left lower leg: No edema. Skin:     Coloration: Skin is not cyanotic, jaundiced or pale. Findings: No abrasion, abscess, bruising, ecchymosis, erythema, signs of injury, laceration, lesion, petechiae, rash or wound. Neurological:      General: No focal deficit present. Mental Status: He is alert. Mental status is at baseline. GCS: GCS eye subscore is 4. GCS verbal subscore is 5. GCS motor subscore is 6. Cranial Nerves: No cranial nerve deficit, dysarthria or facial asymmetry. Motor: No weakness, tremor, atrophy or seizure activity. Coordination: Coordination normal.      Gait: Gait is intact. Psychiatric:         Attention and Perception: Attention and perception normal.         Mood and Affect: Mood and affect normal.         Speech: Speech normal.         Behavior: Behavior normal. Behavior is cooperative. Thought Content: Thought content normal.        Assessment/Plan:   Oswaldo Bennett was seen today for cough, chills, emesis and covid testing. Diagnoses and all orders for this visit:    Viral URI  -     COVID-19; Future  -     benzonatate (TESSALON) 200 MG capsule; Take 1 capsule by mouth every 8 hours as needed for Cough  -     brompheniramine-pseudoephedrine-DM 2-30-10 MG/5ML syrup;  Take 5 mLs by mouth every 6 hours as needed for Congestion or Cough  -     Nasal Dilators (BREATHE RIGHT EXTRA STRENGTH) STRP; 1 strip by Does not apply route nightly as needed (nasal congestion)  -     fluticasone (FLONASE) 50 MCG/ACT nasal spray; 2 sprays by Each Nostril route daily  -     guaiFENesin (MUCINEX MAXIMUM STRENGTH) 1200 MG TB12; Take 1 tablet PO twice daily  -     Humidifiers (COOL MIST HUMIDIFIER) MISC; 1 each by Does not apply route daily as needed (sinus congestion)  -     lidocaine viscous hcl (XYLOCAINE) 2 % SOLN solution; Take 10 mLs by mouth every 8 hours as needed for Irritation    Uncontrolled type 2 diabetes mellitus with hyperglycemia ([address]  Comments:  Expressed concern that could be going into DKA. Advised patient to hold his Pasty Luke, but continue other meds. Orders:  -     Hemoglobin A1C; Future  -     Microalbumin / Creatinine Urine Ratio; Future  -     Renal Function Panel; Future  -     Dulaglutide (TRULICITY) 4.5 DB/6.2WW SOPN; Inject 4.5 mg into the skin once a week  -     glimepiride (AMARYL) 4 MG tablet; Take 1 tablet by mouth 2 times daily  -     Dapagliflozin-metFORMIN HCl ER (XIGDUO XR) 5-1000 MG TB24; TAKE 1 TABLET BY MOUTH TWICE A DAY  -     Insulin Degludec (TRESIBA FLEXTOUCH) 100 UNIT/ML SOPN; Inject 15 Units into the skin nightly  -     pioglitazone (ACTOS) 45 MG tablet; Take 1 tablet by mouth every morning    Hemoglobin A1C greater than 9%, indicating poor diabetic control    Exposure to confirmed case of COVID-19    Hypokalemia due to excessive gastrointestinal loss of potassium  Comments:  Advised to keep himself hydrated with gatorade/powerade. Orders:  -     potassium chloride (KLOR-CON M) 20 MEQ extended release tablet; Take 1 tablet by mouth daily    Acute infectious diarrhea  -     loperamide (RA ANTI-DIARRHEAL) 2 MG capsule; Take 1 capsule by mouth every 6 hours as needed for Diarrhea    Nausea vomiting and diarrhea  Comments:  Advised to consume nutritional supplements such as Glucerna. Orders:  -     prochlorperazine (COMPAZINE) 10 MG tablet; Take 1 tablet by mouth every 8 hours as needed (nausea)    Educated about 2019 novel coronavirus infection  Comments:  Educational information was provided to the patient regarding the signs and symptoms associated with COVID-19.   Patient was also made aware that COVID-19 has a variable presentation and is highly contagious. Being partially or fully vaccinated does not offer 100% protection with being infected from COVID-19, but being fully vaccinated will help greatly reduce the risk of the infection progressing to more severe case, especially that would warrant hospitalization. Patient was informed that COVID-19 is mainly contracted by being in close proximity (via respiratory droplets) to an infected person with COVID-19 and that the virus can be transmitted even if one is asymptomatic. The incubation period is 5-6 days. Patient was also made aware that the virus can be transmitted via oral/body secretions. Patient was advised to wear a mask and gloves especially in large public settings (e.g. grocery store) and maintain social distancing (> 6 feet apart) as much as possible. Patient was informed if one has any symptoms consistent with COVID-19 were to develop or if one has been exposed to a person, who tested positive for COVID-19 to go to one of [address] [place] [address]'s nearest flu clinics to be evaluated and possibly be tested for COVID-19 (based on the discretion of the healthcare evaluator) or seek care with their PCP (via telemedicine), urgent care, or ER. Results may take 2-5 days (or even longer) depending on how and where the patient was tested. The patient was advised to make arrangements with family and/or friends to obtaining basic necessities (eg groceries) and that it is recommended that no one visit within the household to prevent possible exposure and transmission to others. Based on current CDC guidelines, at least 24 hours of being afebrile as well as improvement of overall symptoms is highly recommended before returning to work and minimum of 7-10 days (or longer) has elapsed since symptoms first started.   Patient was informed that NSAIDs as well as Tylenol are not just analgesics, but are also anti-pyretics and should be off them for at least 12-24 hours to truly know if one has a fever or not. Patient was also informed that repeat COVID-19 testing for most people is not recommended per current CDC guidelines  Regarding the recovery process, the patient was informed that each person is different and for those with mild presentation of COVID-19 may recover within 2 to 3 weeks of infection while there is with moderate and more severe cases of COVID-19 may take well over 3 weeks or even months to recover. The patient was encouraged to follow-up as directed with their PCP. I reviewed the plan of care with the patient. Patient acknowledged understanding and agreed with plan of care overall. Medications Discontinued During This Encounter   Medication Reason    Dulaglutide (TRULICITY) 4.5 CU/5.5NU SOPN REORDER    glimepiride (AMARYL) 4 MG tablet REORDER    pioglitazone (ACTOS) 45 MG tablet REORDER    Dapagliflozin-metFORMIN HCl ER (XIGDUO XR) 5-1000 MG TB24 REORDER        General information on medications:  -When it comes to medications, whether with starting or adding a new medication or increasing the dose of a current medication, the benefits and risks have to always be considered and weighed over, especially if one is taking other medications as well. -There are no medications that have no side effects and that there is always a risk involved with taking a medication.    -If a side effect were to occur with starting a new medication or with increasing the dose of a current medication that either the medication can be totally discontinued altogether or simply decrease the dose of it and if this would be the case a follow-up appointment would be deemed necessary.    -The drug allergy list will then be updated with the corresponding side effect(s) if it's deemed to be a true 'drug allergy'.     -The most common adverse effects of medication(s) were addressed at today's visit.    -Lastly, the coverage status of a medication may vary from insurance to insurance and the only way to verify if the medication is covered is to send an actual prescription in.    -The drug formulary of each insurance changes without any warning or notification to the healthcare provider let alone the pharmacy.  -The cost of medications vary from insurance to insurance and the cost is always subject to change just like the drug formulary of each insurance. Follow-up: Return in about 4 weeks (around 1/5/2022) for IP - started on Tresiba. .     Patient was informed that if his or her symptoms worsen to follow up with me sooner or go to the nearest ER if the symptoms are very significant and warrant higher level of care. Regarding my note:  -Duration: 30 minutes - I spent a significant amount of time discussing various issues as noted above and also with formulating a treatment plan for each specific issue. Patient was given the opportunity to ask me any questions and address any concerns/issues.   -This note was composed (by me only and not with assistance via a scribe) to the best of my knowledge and recollection of the encounter with the patient using one of my own customized note templates utilizing a combination of typing and dictating with the [address] [place] [address] speech recognition software. As a result, the note may possibly have various errors (e.g. spelling, grammar, and non-sensible words/phrases/statements) despite reviewing the note prior to signing it for completion. General disclaimer regarding telemedicine (virtual) visits:  Humberto Medina is a 49 y.o. male is being evaluated by a virtual visit (video visit) and/or telephone (without video) encounter to address their concern(s) as mentioned above. Prior to arranging this appointment, the patient was made aware of the need and importance to schedule the visit in this manner given the current ongoing COVID-19 pandemic.   The patient was also made aware that the telemedicine service used (e.g.doxy. me) would not save let alone record any information (audio, video, and text) regarding the actual virtual visit. After this discussion, the patient acknowledged understanding and agreed to today's virtual visit encounter. A caregiver was present when appropriate as well as an  if requested. Due to this being a TeleHealth encounter (during the [facility] public health emergency), evaluation of the following organ systems was overall limited: Vitals/Constitutional/EENT/Resp/CV/GI//MS/Neuro/Skin/Heme-Lymph-Imm. As a result, establishing a diagnosis(s) and formulating a plan of care may be overall more difficult and complicated compared to a conventional (face-to-face) office visit. The patient was made aware about the potential limitations and difficulties of a telemedicine visit such as having technical difficulties related to video and/or audio issues often stemming from a sub-optimal/poor Internet or cellular data connection and/or other factors. If this is judged to be the case then the patient would be informed if deemed relevant and necessary that an in-person follow-up visit may be recommended. Pursuant to the emergency declaration under the [address] [place] [address] authority and the [employer] and [employer] Act, this virtual visit was conducted with the patient's (and/or legal guardian's) consent, to reduce the patient's risk (as well as others) of exposure to COVID-19 and to continue to maintain and provide necessary medical care. The patient (and/or legal guardian) has also been advised to contact this office for worsening conditions or problems, and seek emergency medical treatment and/or call 911 if deemed necessary. Armando Velasco M.D.   [address]    Electronically signed by Arlin Spangler M.D. on 12/8/2021 at 12:48 PM.

## 2021-12-08 NOTE — PATIENT INSTRUCTIONS
professional. [address] any warranty or liability for your use of this information. Patient Education        Diabetic Ketoacidosis (DKA): Care Instructions  Overview  Diabetic ketoacidosis (DKA) happens when the body does not have enough insulin and can't get the sugar it needs for energy. When the body can't use sugar for energy, it starts to use fat for energy. This process makes fatty acids called ketones. The ketones build up in the blood and change the chemical balance in your body. This problem can be very dangerous and needs to be treated. Without treatment, it can lead to a coma or death. DKA occurs most often in people with type 1 diabetes. But people with type 2 diabetes also can get it. DKA can be caused by many things. It can happen if you don't take enough insulin. It can also happen if you have an infection or illness like the flu. Sometimes it happens if you are very dehydrated. DKA can only be treated with insulin and fluids. These are often given in a vein (IV). Follow-up care is a key part of your treatment and safety. Be sure to make and go to all appointments, and call your doctor if you are having problems. It's also a good idea to know your test results and keep a list of the medicines you take. How can you care for yourself at home? To reduce your chance of ketoacidosis:  · Take your insulin and other diabetes medicines on time and in the right dose. ? If an infection caused your DKA and your doctor prescribed antibiotics, take them as directed. Do not stop taking them just because you feel better. You need to take the full course of antibiotics. · Test your blood sugar before meals and at bedtime or as often as your doctor advises. This is the best way to know when your blood sugar is high so you can treat it early. Watching for symptoms is not as helpful. This is because you may not have symptoms until your blood sugar is very high.  Or you may not notice them.  · Teach others at work and at home how to check your blood sugar. Make sure that someone else knows how do it in case you can't. · Wear or carry medical identification at all times. This is very important in case you are too sick or injured to speak for yourself. · Talk to your doctor about when you can start to exercise again. · Eat regular meals that spread your calories and carbohydrate throughout the day. This will help keep your blood sugar steady. · When you are sick:  ? Take your insulin and diabetes medicines. This is important even if you are vomiting and having trouble eating or drinking. Your blood sugar may go up because you are sick. If you are eating less than normal, you may need to change your dose of insulin. Talk with your doctor about a plan when you are well. Then you will know what to do when you are sick. ? Drink extra fluids to prevent dehydration. These include water, broth, and sugar-free drinks. If you don't drink enough, the insulin from your shot may not get into your blood. So your blood sugar may go up. ? Try to eat as you normally do, with a focus on healthy food choices. ? Check your blood sugar at least every 3 to 4 hours. Check it more often if it's rising fast. If your doctor has told you to take an extra insulin dose for high blood sugar levels (for example, above 240 mg/dL) be sure to take the right amount. If you're not sure how much to take, call your doctor. ? Check your temperature and pulse often. If your temperature goes up, call your doctor. You may be getting worse. ? If you take insulin, check your urine or blood for ketones, especially when you have high blood sugar (for example, above 240 mg/dL). Call your doctor if your ketone level is moderate or high. If you know your blood sugar is high, treat it before it gets worse.   · If you missed your usual dose of insulin or other diabetes medicine, take the missed dose or take the amount your doctor told you to take if this happens. · If you and your doctor decide on a dose of extra-fast-acting insulin, give yourself the right dose. If you take insulin and your doctor has not told you how much fast-acting insulin to take based on your blood sugar level, call your doctor. · Drink extra water or sugar-free drinks to prevent dehydration. · Wait 30 minutes after you take extra insulin or missed medicines. Then check your blood sugar again. · If symptoms of high blood sugar get worse or your blood sugar level keeps rising, call your doctor. If you start to feel sleepy or confused, call 911. When should you call for help? Call 911 anytime you think you may need emergency care. For example, call if:    · You passed out (lost consciousness).     · You are confused or cannot think clearly.     · Your blood sugar is very high or very low. Watch closely for changes in your health, and be sure to contact your doctor if:    · Your blood sugar stays outside the level your doctor set for you.     · You have any problems. Where can you learn more? Go to https://[employer] org and sign in to your [facility] account. Enter D596 in the [facility] box to learn more about \"Diabetic Ketoacidosis (DKA): Care Instructions. \"     If you do not have an account, please click on the \"Sign Up Now\" link. Current as of: August 31, 2020               Content Version: 13.0  © 9658-9647 [employer]. Care instructions adapted under license by [employer]. If you have questions about a medical condition or this instruction, always ask your healthcare professional. [place] [address] any warranty or liability for your use of this information.

## 2021-12-08 NOTE — TELEPHONE ENCOUNTER
PA submitted via CMM for Trulicity 0.4SI/9.2JV pen-injectors. Von Turner - PA Case ID: [id number] - Rx #: [id number]    APPROVED today per CM. Coverage Start Date:12/08/2021  Coverage End Date:12/07/2024    PA submitted via CMM for Xigduo XR 5-1000MG tablets. Key: [id number] - PA Case ID: [id number] - Rx #: [id number]    APPROVED today per CM. Coverage Start Date:12/08/2021  Coverage End Date:12/08/2023    Please advise patient. Thank you!

## 2021-12-09 LAB — SARS-COV-2: DETECTED

## 2021-12-10 ENCOUNTER — TELEPHONE (OUTPATIENT)
Dept: FAMILY MEDICINE CLINIC | Age: 56
End: 2021-12-10

## 2021-12-10 DIAGNOSIS — J06.9 ACUTE RESPIRATORY DISEASE DUE TO COVID-19 VIRUS: Primary | ICD-10-CM

## 2021-12-10 DIAGNOSIS — U07.1 ACUTE RESPIRATORY DISEASE DUE TO COVID-19 VIRUS: Primary | ICD-10-CM

## 2021-12-10 RX ORDER — ALBUTEROL SULFATE 90 UG/1
2 AEROSOL, METERED RESPIRATORY (INHALATION) EVERY 6 HOURS PRN
Qty: 18 G | Refills: 0 | Status: SHIPPED | OUTPATIENT
Start: 2021-12-10 | End: 2022-01-03

## 2021-12-10 NOTE — TELEPHONE ENCOUNTER
Please extend my apologies to the patient. I prescribed him Albuterol to be used as needed and Atrovent to be three times a day every day until he gets better. I would recommend that he obtain an incentive spirometer online to help with strengthening his lungs and to use his phone to monitor his oxygen saturation. If his O2 saturation is the <90% and his condition worsens for him to go nearest ER for evaluation. Armando Bentley[address]

## 2021-12-10 NOTE — TELEPHONE ENCOUNTER
Pt called and stated that at his visit he supposed to have and inhaler sent to the pharmacy and that has not been done.

## 2021-12-15 ENCOUNTER — TELEPHONE (OUTPATIENT)
Dept: FAMILY MEDICINE CLINIC | Age: 56
End: 2021-12-15

## 2021-12-15 NOTE — TELEPHONE ENCOUNTER
----- Message from Que Ga sent at 12/15/2021  3:47 PM EST -----  Subject: Message to Provider    QUESTIONS  Information for Provider? Took a rapid covid test yesterday that was   positive. Needs to know how long to quarantine and also a note for work.   ---------------------------------------------------------------------------  --------------  [address]  What is the best way for the office to contact you? OK to leave message on   voicemail  Preferred Call Back Phone Number? [phone]  ---------------------------------------------------------------------------  --------------  SCRIPT ANSWERS  Relationship to Patient?  Self

## 2021-12-16 NOTE — TELEPHONE ENCOUNTER
Okay, please compose the letter stating when he was seen by me and again when he tested positive for COVID-19. He may return back to work without any work restrictions on Dec 20th. Armando Reyes [address]

## 2021-12-16 NOTE — TELEPHONE ENCOUNTER
Spoke with pt who stated that only lingering sx is a cough on occasion. Pt stated that he was contacted by the health department and quarantine ends this Friday. Pt stated that he is able to go back to work on Monday, 12/20 without restrictions. Just needs the letter from PCP stating that he can go back. Please send letter via [employer] when completed.

## 2021-12-16 NOTE — TELEPHONE ENCOUNTER
Please also verify that that health department should have also contacted him and said when his quarantine period should end. Please also ask if patient thinks he's able to return back to work without any work restrictions. Include in his letter when he tested positive for COVID-19 and when he was seen by me. Armando Reyes 177

## 2021-12-16 NOTE — TELEPHONE ENCOUNTER
Spoke with pt. Pt stated that he tested positive for covid on 12/14 but symptoms started on 12/6. Pt declined scheduling an appt at this time. Pt has an appt already scheduled for 1/5 and would just like a letter to return to work on 12/20. Please advise.

## 2021-12-17 NOTE — TELEPHONE ENCOUNTER
Spoke with pt to clarify if he wanted the letter mailed or picked up. Pt requested letter to be sent via [address], so I put letter in [address]

## 2021-12-20 ENCOUNTER — TELEPHONE (OUTPATIENT)
Dept: FAMILY MEDICINE CLINIC | Age: 56
End: 2021-12-20

## 2021-12-20 DIAGNOSIS — Z79.4 INSULIN DEPENDENT TYPE 2 DIABETES MELLITUS (HCC): Primary | ICD-10-CM

## 2021-12-20 DIAGNOSIS — E11.9 INSULIN DEPENDENT TYPE 2 DIABETES MELLITUS (HCC): Primary | ICD-10-CM

## 2021-12-20 NOTE — TELEPHONE ENCOUNTER
----- Message from Charliehaileylissette Diogo sent at 12/17/2021  5:52 PM EST -----  Subject: Message to Provider    QUESTIONS  Information for Provider? pt received his meds from the pharmacy but it   did not include the needles that he needs to go with it requesting a call   back to find out how he can get them   ---------------------------------------------------------------------------  --------------  [address]  What is the best way for the office to contact you? OK to leave message on   voicemail, OK to respond with electronic message via [employer] portal (only   for patients who have registered [employer] account)  Preferred Call Back Phone Number? [phone]  ---------------------------------------------------------------------------  --------------  SCRIPT ANSWERS  Relationship to Patient?  Self

## 2021-12-21 ENCOUNTER — TELEPHONE (OUTPATIENT)
Dept: ORTHOPEDIC SURGERY | Age: 56
End: 2021-12-21

## 2021-12-21 ENCOUNTER — NURSE TRIAGE (OUTPATIENT)
Dept: OTHER | Facility: CLINIC | Age: 56
End: 2021-12-21

## 2021-12-21 ENCOUNTER — VIRTUAL VISIT (OUTPATIENT)
Dept: FAMILY MEDICINE CLINIC | Age: 56
End: 2021-12-21
Payer: COMMERCIAL

## 2021-12-21 DIAGNOSIS — Z76.89 RETURN TO WORK EXAM: ICD-10-CM

## 2021-12-21 DIAGNOSIS — E11.65 UNCONTROLLED TYPE 2 DIABETES MELLITUS WITH HYPERGLYCEMIA (HCC): ICD-10-CM

## 2021-12-21 DIAGNOSIS — R73.09 HEMOGLOBIN A1C GREATER THAN 9%, INDICATING POOR DIABETIC CONTROL: ICD-10-CM

## 2021-12-21 DIAGNOSIS — Z99.89 OBSTRUCTIVE SLEEP APNEA ON CPAP: ICD-10-CM

## 2021-12-21 DIAGNOSIS — U09.9 COVID-19 LONG HAULER: Primary | ICD-10-CM

## 2021-12-21 DIAGNOSIS — J45.41 ASTHMA IN ADULT, MODERATE PERSISTENT, WITH ACUTE EXACERBATION: ICD-10-CM

## 2021-12-21 DIAGNOSIS — G47.33 OBSTRUCTIVE SLEEP APNEA ON CPAP: ICD-10-CM

## 2021-12-21 DIAGNOSIS — Z71.89 EDUCATED ABOUT 2019 NOVEL CORONAVIRUS INFECTION: ICD-10-CM

## 2021-12-21 PROCEDURE — 99214 OFFICE O/P EST MOD 30 MIN: CPT | Performed by: FAMILY MEDICINE

## 2021-12-21 RX ORDER — FLUTICASONE FUROATE, UMECLIDINIUM BROMIDE AND VILANTEROL TRIFENATATE 100; 62.5; 25 UG/1; UG/1; UG/1
1 POWDER RESPIRATORY (INHALATION) DAILY
Qty: 60 EACH | Refills: 2 | Status: SHIPPED | OUTPATIENT
Start: 2021-12-21

## 2021-12-21 RX ORDER — ERGOCALCIFEROL 1.25 MG/1
50000 CAPSULE ORAL WEEKLY
Qty: 4 CAPSULE | Refills: 0 | Status: SHIPPED | OUTPATIENT
Start: 2021-12-21

## 2021-12-21 ASSESSMENT — ENCOUNTER SYMPTOMS
RHINORRHEA: 0
TROUBLE SWALLOWING: 0
VOMITING: 0
BLOOD IN STOOL: 0
ABDOMINAL PAIN: 0
SHORTNESS OF BREATH: 1
DIARRHEA: 0
CONSTIPATION: 0
SINUS PRESSURE: 0
NAUSEA: 0
ABDOMINAL DISTENTION: 0
CHEST TIGHTNESS: 1
WHEEZING: 1
COUGH: 0
BACK PAIN: 0

## 2021-12-21 NOTE — TELEPHONE ENCOUNTER
Received call from Magalis Jones at [facility] with [employer]. Subjective: Caller states \"I am wanting a evisit\"     Current Symptoms: coughing, wheezing, sneezing, runny nose, SOB, he is COVID+    Onset: 12/8/2021    Patient has been seen for these symptoms and states that he just needs a Evisit to get a note for work to remain off.      Reason for Disposition   Requesting regular office appointment    Protocols used: INFORMATION ONLY CALL - NO TRIAGE-ADULT-

## 2021-12-21 NOTE — PROGRESS NOTES
daily 16 g 1    guaiFENesin (MUCINEX MAXIMUM STRENGTH) 1200 MG TB12 Take 1 tablet PO twice daily 60 tablet 2    Humidifiers (COOL MIST HUMIDIFIER) MISC 1 each by Does not apply route daily as needed (sinus congestion) 1 each 0    Dulaglutide (TRULICITY) 4.5 BS/8.3CD SOPN Inject 4.5 mg into the skin once a week 4 pen 2    glimepiride (AMARYL) 4 MG tablet Take 1 tablet by mouth 2 times daily 180 tablet 0    Dapagliflozin-metFORMIN HCl ER (XIGDUO XR) 5-1000 MG TB24 TAKE 1 TABLET BY MOUTH TWICE A  tablet 0    Insulin Degludec (TRESIBA FLEXTOUCH) 100 UNIT/ML SOPN Inject 15 Units into the skin nightly 3 pen 2    pioglitazone (ACTOS) 45 MG tablet Take 1 tablet by mouth every morning 90 tablet 0    potassium chloride (KLOR-CON M) 20 MEQ extended release tablet Take 1 tablet by mouth daily 30 tablet 0    prochlorperazine (COMPAZINE) 10 MG tablet Take 1 tablet by mouth every 8 hours as needed (nausea) 15 tablet 1    gabapentin (NEURONTIN) 600 MG tablet Take 1 tablet by mouth 3 times daily for 30 days. 90 tablet 0    meloxicam (MOBIC) 15 MG tablet Take 1 tablet by mouth daily as needed for Pain (arthritis) 30 tablet 2    Continuous Blood Gluc Transmit (DEXCOM G6 TRANSMITTER) MISC CHANGE EVERY 3 MONTHS. 1 each 3    diclofenac sodium (VOLTAREN) 1 % GEL Apply 2 g topically every 6 hours as needed for Pain (back pain) 150 g 2    Continuous Blood Gluc Sensor (DEXCOM G6 SENSOR) MISC Change every 10 days. 1 each 11     No current facility-administered medications on file prior to visit.         Family History   Problem Relation Age of Onset    Cancer Father 39        colon    Diabetes Father     Alzheimer's Disease Father     Diabetes Mother     Diabetes Maternal Aunt     Cancer Sister         breast       Social History     Tobacco Use    Smoking status: Never Smoker    Smokeless tobacco: Never Used   Substance Use Topics    Alcohol use: No     Alcohol/week: 0.0 standard drinks      Review of Systems Constitutional: Positive for activity change and fatigue. Negative for appetite change, fever and unexpected weight change. HENT: Positive for congestion. Negative for rhinorrhea, sinus pressure and trouble swallowing. Respiratory: Positive for chest tightness, shortness of breath and wheezing. Negative for cough. Cardiovascular: Negative for chest pain, palpitations and leg swelling. Gastrointestinal: Negative for abdominal distention, abdominal pain, blood in stool, constipation, diarrhea, nausea and vomiting. Genitourinary: Negative for dysuria, frequency and hematuria. Musculoskeletal: Positive for arthralgias and myalgias. Negative for back pain. Skin: Negative for rash. Neurological: Positive for weakness. Negative for dizziness, light-headedness, numbness and headaches. Psychiatric/Behavioral: Positive for sleep disturbance. The patient is nervous/anxious.         Lab Results   Component Value Date    WBC 11.4 (H) 06/23/2015    HGB 15.3 06/23/2015    HCT 47.0 06/23/2015    MCV 89.5 06/23/2015     06/23/2015       Chemistry        Component Value Date/Time     08/03/2021 1543    K 4.6 08/03/2021 1543     08/03/2021 1543    CO2 23 08/03/2021 1543    BUN 17 08/03/2021 1543    CREATININE 1.2 08/03/2021 1543        Component Value Date/Time    CALCIUM 9.3 08/03/2021 1543    ALKPHOS 117 06/17/2020 1221    AST 20 06/17/2020 1221    ALT 41 (H) 06/17/2020 1221    BILITOT 0.7 06/17/2020 1221          Lab Results   Component Value Date    ALT 41 (H) 06/17/2020    AST 20 06/17/2020    ALKPHOS 117 06/17/2020    BILITOT 0.7 06/17/2020     Lab Results   Component Value Date    LABA1C 9.7 08/03/2021     Lab Results   Component Value Date    .7 08/03/2021       Wt Readings from Last 3 Encounters:   08/30/21 207 lb (93.9 kg)   08/02/21 209 lb (94.8 kg)   03/10/21 203 lb 6.4 oz (92.3 kg)     BP Readings from Last 3 Encounters:   08/30/21 110/62   08/02/21 110/82   03/10/21 110/62 Pulse Readings from Last 3 Encounters:   08/30/21 97   08/02/21 110   03/10/21 85       There were no vitals taken for this visit. Physical Exam  Vitals reviewed. Constitutional:       General: He is awake. He is in acute distress. Appearance: Normal appearance. He is overweight. He is ill-appearing. HENT:      Head: Normocephalic and atraumatic. Right Ear: External ear normal.      Left Ear: External ear normal.      Nose: Nose normal.   Eyes:      General: No scleral icterus. Right eye: No discharge. Left eye: No discharge. Extraocular Movements: Extraocular movements intact. Conjunctiva/sclera: Conjunctivae normal.   Pulmonary:      Effort: Pulmonary effort is normal. No respiratory distress. Breath sounds: No stridor. No wheezing. Abdominal:      General: There is no distension. Musculoskeletal:         General: Normal range of motion. Cervical back: Normal range of motion. No erythema. Skin:     Coloration: Skin is not cyanotic, jaundiced or pale. Findings: No erythema. Neurological:      General: No focal deficit present. Mental Status: He is alert and oriented to person, place, and time. Mental status is at baseline. Psychiatric:         Attention and Perception: Attention and perception normal.         Mood and Affect: Mood and affect normal.         Speech: Speech normal.         Behavior: Behavior normal. Behavior is cooperative. Thought Content: Thought content normal.        Assessment/Plan:   Aniyah Bagley was seen today for positive for covid-19 and post-covid symptoms. Diagnoses and all orders for this visit:    COVID-19 long hauler  Comments:  I expressed my reservation about him returning to work given that he's clearly still very symptomatic. Based on his presentation, I informed him that it would not be wise to return to work so soon and would greatly benefit from taking more days off to recover.   He stated that his work needs him and that his work is aware about his current medical condition. He stated that he despite how he feels, he needs to go back to work and is willing to return back to work without any work restrictions as early as tomorrow (12/22/2021). I also advised him to obtain [employer] Breathing Lung Expander & Mucus Removal Device on [address] [facility] [address]. Will start on Vit D supplement to help with fatigue. Advised to keep himself well hydrated with Gatorade/Powerade. Orders:  -     vitamin D (ERGOCALCIFEROL) 1.25 MG (73075 UT) CAPS capsule; Take 1 capsule by mouth once a week    Asthma in adult, moderate persistent, with acute exacerbation  Comments:  Given that he's an uncontrolled diabetic, will avoid oral corticosteroids. Orders:  -     fluticasone-umeclidin-vilant (Rigo Ramya) 100-62.5-25 MCG/INH AEPB; Inhale 1 puff into the lungs daily    Uncontrolled type 2 diabetes mellitus with hyperglycemia (HCC)  Comments:  Advised to start [place] asap along with continuing his other medications. Titration instructions provided. Hemoglobin A1C greater than 9%, indicating poor diabetic control    Obstructive sleep apnea on CPAP  Comments:  Continue CPAP. Educated about 2019 novel coronavirus infection    Return to work exam      I reviewed the plan of care with the patient. Patient acknowledged understanding and agreed with plan of care overall. Medications Discontinued During This Encounter   Medication Reason    ipratropium (ATROVENT HFA) 17 MCG/ACT inhaler Alternate therapy    loperamide (RA ANTI-DIARRHEAL) 2 MG capsule LIST CLEANUP        General information on medications:  -When it comes to medications, whether with starting or adding a new medication or increasing the dose of a current medication, the benefits and risks have to always be considered and weighed over, especially if one is taking other medications as well.    -There are no medications that have no side effects and that there is always a risk involved with taking a medication.    -If a side effect were to occur with starting a new medication or with increasing the dose of a current medication that either the medication can be totally discontinued altogether or simply decrease the dose of it and if this would be the case a follow-up appointment would be deemed necessary.    -The drug allergy list will then be updated with the corresponding side effect(s) if it's deemed to be a true 'drug allergy'. -The most common adverse effects of medication(s) were addressed at today's visit.    -Lastly, the coverage status of a medication may vary from insurance to insurance and the only way to verify if the medication is covered is to send an actual prescription in.    -The drug formulary of each insurance changes without any warning or notification to the healthcare provider let alone the pharmacy.  -The cost of medications vary from insurance to insurance and the cost is always subject to change just like the drug formulary of each insurance. Follow-up: No follow-ups on file. .     Patient was informed that if his or her symptoms worsen to follow up with me sooner or go to the nearest ER if the symptoms are very significant and warrant higher level of care. Regarding my note:  -This note was composed (by me only and not with assistance via a scribe) to the best of my knowledge and recollection of the encounter with the patient using one of my own customized note templates utilizing a combination of typing and dictating with the [address] [place] [id number] speech recognition software. As a result, the note may possibly have various errors (e.g. spelling, grammar, and non-sensible words/phrases/statements) despite reviewing the note prior to signing it for completion.       General disclaimer regarding telemedicine (virtual) visits:  Ramon Beth is a 49 y.o. male is being evaluated by a virtual visit (video visit) and/or telephone (without video) encounter to address their concern(s) as mentioned above. Prior to arranging this appointment, the patient was made aware of the need and importance to schedule the visit in this manner given the current ongoing COVID-19 pandemic. The patient was also made aware that the telemedicine service used (e.g.doxy. me) would not save let alone record any information (audio, video, and text) regarding the actual virtual visit. After this discussion, the patient acknowledged understanding and agreed to today's virtual visit encounter. A caregiver was present when appropriate as well as an  if requested. Due to this being a TeleHealth encounter (during the [facility] public health emergency), evaluation of the following organ systems was overall limited: Vitals/Constitutional/EENT/Resp/CV/GI//MS/Neuro/Skin/Heme-Lymph-Imm. As a result, establishing a diagnosis(s) and formulating a plan of care may be overall more difficult and complicated compared to a conventional (face-to-face) office visit. The patient was made aware about the potential limitations and difficulties of a telemedicine visit such as having technical difficulties related to video and/or audio issues often stemming from a sub-optimal/poor Internet or cellular data connection and/or other factors. If this is judged to be the case then the patient would be informed if deemed relevant and necessary that an in-person follow-up visit may be recommended. Pursuant to the emergency declaration under the [facility], [address] [place] [address] authority and the [employer] Act, this virtual visit was conducted with the patient's (and/or legal guardian's) consent, to reduce the patient's risk (as well as others) of exposure to COVID-19 and to continue to maintain and provide necessary medical care.   The patient (and/or legal guardian) has also been advised to contact this office for worsening conditions or problems, and seek emergency medical treatment and/or call 911 if deemed necessary. Armando Ramirez M.D.   [address] [place] [address]    Electronically signed by Lana Oakes M.D. on 12/21/2021 at 4:29 PM.

## 2021-12-21 NOTE — TELEPHONE ENCOUNTER
Called and spoke to the pharmacy, Esha Rich does not come with needles, he needs pen needles for the [employer] please.

## 2021-12-21 NOTE — PATIENT INSTRUCTIONS
[facility] units at night. Add 3 units each day if fasting blood sugar is still above 200. Cap it at 25 units.

## 2021-12-31 NOTE — PROGRESS NOTES
Melissa Bruno   49 y.o. male   1965    HPI:    Patient was last seen by me on 12/21/2021. Reason(s) for visit:   Chief Complaint   Patient presents with    Diabetes    Chest Pain     feels like an elephant on his chest  started 2 weeks ago     Shortness of Breath       During his last office visit, patient informed me that his work requested that he return back to work immediately due to being understaffed. Patient was prescribed Trelegy and advised to continue using it daily. Returned back to work on 12/22/2021. COVID-19:  -Clinical status - chronic  -Feels like an elephant's on her chest.  -Can't sleep with his CPAP. -Had to take 2 days off.  -Albuterol use - 3x/day and also using incentive spirometer.  -Fever - none  -Fatigue - yes  -Cough - yes with mild yellow phlegm  -SOB/orthopnea/wheezing - yes  -Diarrhea - none  -Arthralgia/generalized weakness - yes - matt with exertion. -Appetite - mild improvement  -Sleep - has YUNIEL  -Taste/smell - none  -GERD - none    Type II diabetes mellitus:  Regarding diabetes, patient is on medications as noted below in his medication list. Since the last visit. ..  -Last A1c = 9.7 (8/3/2021)  -LOV - FBS avg 250-300 (down from 400 prior to that)  -FBS avg 100-175 with starting [place]. Stress:  -Reported of increased stress related to recent illness and having to return back to work despite feeling sick.  -Has tried various meds before including Lexapro, which has helped.  -Had stress during his divorce and was on an anti-depressant.       Allergies   Allergen Reactions    Indomethacin      diarrhea       Current Outpatient Medications on File Prior to Visit   Medication Sig Dispense Refill    fluticasone-umeclidin-vilant (TRELEGY ELLIPTA) 100-62.5-25 MCG/INH AEPB Inhale 1 puff into the lungs daily 60 each 2    brompheniramine-pseudoephedrine-DM 2-30-10 MG/5ML syrup Take 5 mLs by mouth every 6 hours as needed for Congestion or Cough 118 mL 0    guaiFENesin (MUCINEX MAXIMUM STRENGTH) 1200 MG TB12 Take 1 tablet PO twice daily 60 tablet 2    glimepiride (AMARYL) 4 MG tablet Take 1 tablet by mouth 2 times daily 180 tablet 0    Dapagliflozin-metFORMIN HCl ER (XIGDUO XR) 5-1000 MG TB24 TAKE 1 TABLET BY MOUTH TWICE A  tablet 0    pioglitazone (ACTOS) 45 MG tablet Take 1 tablet by mouth every morning 90 tablet 0    potassium chloride (KLOR-CON M) 20 MEQ extended release tablet Take 1 tablet by mouth daily 30 tablet 0    Continuous Blood Gluc Transmit (DEXCOM G6 TRANSMITTER) MISC CHANGE EVERY 3 MONTHS. 1 each 3    diclofenac sodium (VOLTAREN) 1 % GEL Apply 2 g topically every 6 hours as needed for Pain (back pain) 150 g 2    Continuous Blood Gluc Sensor (DEXCOM G6 SENSOR) MISC Change every 10 days.  1 each 11    Insulin Pen Needle 32G X 5 MM MISC 1 each by Does not apply route daily 50 each 3    vitamin D (ERGOCALCIFEROL) 1.25 MG (37133 UT) CAPS capsule Take 1 capsule by mouth once a week (Patient not taking: Reported on 1/5/2022) 4 capsule 0    benzonatate (TESSALON) 200 MG capsule Take 1 capsule by mouth every 8 hours as needed for Cough (Patient not taking: Reported on 1/5/2022) 15 capsule 1    Nasal Dilators (BREATHE RIGHT EXTRA STRENGTH) STRP 1 strip by Does not apply route nightly as needed (nasal congestion) 8 strip 2    fluticasone (FLONASE) 50 MCG/ACT nasal spray 2 sprays by Each Nostril route daily 16 g 1    Humidifiers (COOL MIST HUMIDIFIER) MISC 1 each by Does not apply route daily as needed (sinus congestion) 1 each 0    Dulaglutide (TRULICITY) 4.5 KB/2.3BH SOPN Inject 4.5 mg into the skin once a week (Patient not taking: Reported on 1/5/2022) 4 pen 2    Insulin Degludec (TRESIBA FLEXTOUCH) 100 UNIT/ML SOPN Inject 15 Units into the skin nightly 3 pen 2    prochlorperazine (COMPAZINE) 10 MG tablet Take 1 tablet by mouth every 8 hours as needed (nausea) (Patient not taking: Reported on 1/5/2022) 15 tablet 1     No current facility-administered Negative for back pain. Skin: Negative for rash. Neurological: Positive for weakness and numbness. Negative for dizziness, light-headedness and headaches. Wt Readings from Last 3 Encounters:   01/05/22 189 lb (85.7 kg)   08/30/21 207 lb (93.9 kg)   08/02/21 209 lb (94.8 kg)       BP Readings from Last 3 Encounters:   01/05/22 118/84   08/30/21 110/62   08/02/21 110/82       Pulse Readings from Last 3 Encounters:   01/05/22 111   08/30/21 97   08/02/21 110       /84   Pulse 111   Ht 6' (1.829 m)   Wt 189 lb (85.7 kg)   SpO2 98%   BMI 25.63 kg/m²      Physical Exam  Vitals reviewed. Constitutional:       General: He is awake. He is not in acute distress. Appearance: He is overweight. He is not ill-appearing or diaphoretic. HENT:      Head: Normocephalic and atraumatic. No abrasion or masses. Hair is normal.      Right Ear: External ear normal.      Left Ear: External ear normal.      Nose: Nose normal.   Eyes:      General: Lids are normal. Gaze aligned appropriately. No scleral icterus. Right eye: No discharge. Left eye: No discharge. Extraocular Movements: Extraocular movements intact. Conjunctiva/sclera: Conjunctivae normal.   Neck:      Trachea: Phonation normal.   Cardiovascular:      Rate and Rhythm: Normal rate and regular rhythm. Pulmonary:      Effort: Pulmonary effort is normal. No respiratory distress. Breath sounds: No wheezing, rhonchi or rales. Abdominal:      General: Abdomen is flat. There is no distension. Palpations: Abdomen is soft. Musculoskeletal:         General: No deformity. Normal range of motion. Cervical back: Normal range of motion. No erythema. Right lower leg: No edema. Left lower leg: No edema. Skin:     Coloration: Skin is not cyanotic, jaundiced or pale. Findings: No abrasion, abscess, bruising, ecchymosis, erythema, signs of injury, laceration, lesion, petechiae, rash or wound.    Neurological: General: No focal deficit present. Mental Status: He is alert. Mental status is at baseline. GCS: GCS eye subscore is 4. GCS verbal subscore is 5. GCS motor subscore is 6. Cranial Nerves: No cranial nerve deficit, dysarthria or facial asymmetry. Motor: No weakness, tremor, atrophy or seizure activity. Coordination: Coordination normal.      Gait: Gait is intact. Psychiatric:         Attention and Perception: Attention and perception normal.         Mood and Affect: Mood and affect normal.         Speech: Speech normal.         Behavior: Behavior normal. Behavior is cooperative. Thought Content: Thought content normal.       Assessment/Plan:   Noemi Min was seen today for diabetes, chest pain and shortness of breath. Diagnoses and all orders for this visit:    COVID-19 long hauler  -     albuterol sulfate  (90 Base) MCG/ACT inhaler; Inhale 2 puffs into the lungs every 6 hours as needed for Wheezing or Shortness of Breath  -     6 Minute Walk Test; [facility]  -     [facility] CHEST STANDARD (2 VW); Future  -     fluvoxaMINE (LUVOX) 25 MG tablet; Take 1 tablet by mouth nightly    Postinflammatory pulmonary fibrosis (HCC)  -     albuterol sulfate  (90 Base) MCG/ACT inhaler; Inhale 2 puffs into the lungs every 6 hours as needed for Wheezing or Shortness of Breath  -     6 Minute Walk Test; [facility]  -     [facility]monary [facility] CHEST STANDARD (2 VW); Future  -     fluvoxaMINE (LUVOX) 25 MG tablet; Take 1 tablet by mouth nightly  -     Incentive spirometer     Productive cough  -     levoFLOXacin (LEVAQUIN) 750 MG tablet; Take 1 tablet by mouth daily for 7 days    Uncontrolled type 2 diabetes mellitus with hyperglycemia ([facility] [address]  Comments:  Advised to continue current med. Will increase [place].     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 Stress  Comments:  Informed patient that Escitalopram is a SSRI like Fluvoxamine. Discussed that some studies suggest that Fluvoxamine may have some benefit with those who have COVID-19, especially for acute treatment (at a higher dose). I discussed with patient the difference between acute vs preventative/maintenance medications regarding treatment of anxiety/depression/bipolar disorder. Drugs of the SSRI/SNRI class as well as anti-psychotics can have side effects such as weight gain, sexual dysfunction, insomnia, headache, abdominal discomfort, nausea, vomiting, etc. These medications are generally effective at alleviating symptoms of anxiety and/or depression, but side effects tend to occur within the first 1-2 weeks of taking the medication. Patient was informed to let me know if any significant side effects do occur. Patient was instructed to take the medication(s) every day as directed and that this medication is not an as needed medication because the medication may take at least 2 weeks to see a difference if not at least 4-6 weeks to have a beneficial effect and that by going even 1-2 days without taking the medication one could risk of having rebound anxiety/depression. In addition, the patient was informed that this medication cannot be abruptly stopped after having had taken it for a long period of time and that the medication would have to be gradually tapered off under the guidance of a healthcare provider. The patient was informed that 4-6 weeks follow-up is generally recommended follow-up time for starting/adding a new medication or with adjusting the dose of any given medication. I have discussed with patient (at some point) and made aware that some patients may benefit from more than one medication compared to monotherapy.   A combination of both medical and behavioral therapy may yield better/more effective results than either therapy alone. Lastly, it's worth mentioning that the medication(s) prescribed will not completely resolve feelings of anxiety/depression as well as make one immune or completely prevent from having more issues with anxiety and/or depression. Stress (emotional & physical) is part of everyday life. Cervical radiculopathy  -     meloxicam (MOBIC) 15 MG tablet; Take 1 tablet by mouth daily as needed for Pain (arthritis)  -     gabapentin (NEURONTIN) 600 MG tablet; Take 1 tablet by mouth 3 times daily for 30 days. Educated about 2019 novel coronavirus infection  Comments:  Educational information was provided to the patient regarding the signs and symptoms associated with COVID-19. Patient was also made aware that COVID-19 has a variable presentation and is highly contagious. Being partially or fully vaccinated does not offer 100% protection with being infected from COVID-19, but being fully vaccinated will help greatly reduce the risk of the infection progressing to more severe case, especially that would warrant hospitalization. Patient was informed that COVID-19 is mainly contracted by being in close proximity (via respiratory droplets) to an infected person with COVID-19 and that the virus can be transmitted even if one is asymptomatic. The incubation period is 4-5 days. Patient was also made aware that the virus can be transmitted via oral/body secretions, but less likely. Patient was advised to wear a mask and gloves especially in large public settings (e.g. grocery store) and maintain social distancing (> 6 feet apart) as much as possible.   Patient was informed if one has any symptoms consistent with COVID-19 were to develop or if one has been exposed to a person, who tested positive for COVID-19 to go to one of [facility]'s nearest flu clinics to be evaluated and possibly be tested for COVID-19 (based on the discretion of the healthcare evaluator) or seek care with their PCP (via telemedicine), urgent care, or ER. Results may take 2-5 days (or even longer) depending on how and where the patient was tested. The patient was advised to make arrangements with family and/or friends to obtaining basic necessities (eg groceries) and that it is recommended that no one visit within the household to prevent possible exposure and transmission to others. Based on current CDC guidelines, at least 24 hours of being afebrile as well as improvement of overall symptoms is highly recommended before returning to work and minimum of 5-7 days (or longer) has elapsed since symptoms first started. Patient was informed that NSAIDs as well as Tylenol are not just analgesics, but are also anti-pyretics and should be off them for at least 12-24 hours to truly know if one has a fever or not. Patient was also informed that repeat COVID-19 testing for most people is not recommended per current CDC guidelines  Regarding the recovery process, the patient was informed that each person is different and for those with mild presentation of COVID-19 may recover within 2 to 3 weeks of infection while there is with moderate and more severe cases of COVID-19 may take well over 3 weeks or even months to recover. The patient was encouraged to follow-up as directed with their PCP. I reviewed the plan of care with the patient. Patient acknowledged understanding and agreed with plan of care overall.     Medications Discontinued During This Encounter   Medication Reason    meloxicam (MOBIC) 15 MG tablet REORDER    gabapentin (NEURONTIN) 600 MG tablet REORDER    albuterol sulfate  (90 Base) MCG/ACT inhaler REORDER        General information on medications:  -When it comes to medications, whether with starting or adding a new medication or increasing the dose of a current medication, the benefits and risks have to always be considered and weighed over, especially if one is taking other medications as well. -There are no medications that have no side effects and that there is always a risk involved with taking a medication.    -If a side effect were to occur with starting a new medication or with increasing the dose of a current medication that either the medication can be totally discontinued altogether or simply decrease the dose of it and if this would be the case a follow-up appointment would be deemed necessary.    -The drug allergy list will then be updated with the corresponding side effect(s) if it's deemed to be a true 'drug allergy'. -The most common adverse effects of medication(s) were addressed at today's visit.    -Lastly, the coverage status of a medication may vary from insurance to insurance and the only way to verify if the medication is covered is to send an actual prescription in.    -The drug formulary of each insurance changes without any warning or notification to the healthcare provider let alone the pharmacy.  -The cost of medications vary from insurance to insurance and the cost is always subject to change just like the drug formulary. Follow-up: Return in about 4 weeks (around 2/2/2022) for IP or VV - started on Luvox. .     Patient was informed that if his or her symptoms worsen to follow up with me sooner or go to the nearest ER if the symptoms are very significant and warrant higher level of care. Regarding my note:  -This note was composed (by me only and not with assistance via a scribe) to the best of my knowledge and recollection of the encounter with the patient using one of my own customized note templates utilizing a combination of typing and dictating with the [address] [place] [address] speech recognition software. As a result, the note may possibly contain various errors (e.g. spelling, grammar, and non-sensible words/phrases/statements) despite reviewing the note prior to signing it for completion. Armando Clay M.D. [address] [place] [address]    Electronically signed by Isidoro Penn M.D. on 1/5/2022 at 12:41 PM.    Addendum added:  Correction(s): reason for starting on Fluvoxamine. Electronically signed by Juan Tafoya.  Urszula Clay MD on 1/11/2022 at 11:14 PM

## 2022-01-05 ENCOUNTER — OFFICE VISIT (OUTPATIENT)
Dept: FAMILY MEDICINE CLINIC | Age: 57
End: 2022-01-05
Payer: COMMERCIAL

## 2022-01-05 ENCOUNTER — HOSPITAL ENCOUNTER (OUTPATIENT)
Dept: GENERAL RADIOLOGY | Age: 57
Discharge: HOME | End: 2022-01-05
Payer: COMMERCIAL

## 2022-01-05 VITALS
BODY MASS INDEX: 25.6 KG/M2 | OXYGEN SATURATION: 98 % | HEART RATE: 111 BPM | HEIGHT: 72 IN | DIASTOLIC BLOOD PRESSURE: 84 MMHG | SYSTOLIC BLOOD PRESSURE: 118 MMHG | WEIGHT: 189 LBS

## 2022-01-05 DIAGNOSIS — M54.12 CERVICAL RADICULOPATHY: ICD-10-CM

## 2022-01-05 DIAGNOSIS — R73.09 HEMOGLOBIN A1C GREATER THAN 9%, INDICATING POOR DIABETIC CONTROL: ICD-10-CM

## 2022-01-05 DIAGNOSIS — F43.9 STRESS: ICD-10-CM

## 2022-01-05 DIAGNOSIS — E11.49 OTHER DIABETIC NEUROLOGICAL COMPLICATION ASSOCIATED WITH TYPE 2 DIABETES MELLITUS (HCC): ICD-10-CM

## 2022-01-05 DIAGNOSIS — U09.9 COVID-19 LONG HAULER: ICD-10-CM

## 2022-01-05 DIAGNOSIS — U09.9 COVID-19 LONG HAULER: Primary | ICD-10-CM

## 2022-01-05 DIAGNOSIS — J84.10 POSTINFLAMMATORY PULMONARY FIBROSIS (HCC): ICD-10-CM

## 2022-01-05 DIAGNOSIS — R05.8 PRODUCTIVE COUGH: ICD-10-CM

## 2022-01-05 DIAGNOSIS — E11.65 UNCONTROLLED TYPE 2 DIABETES MELLITUS WITH HYPERGLYCEMIA (HCC): ICD-10-CM

## 2022-01-05 DIAGNOSIS — Z71.89 EDUCATED ABOUT 2019 NOVEL CORONAVIRUS INFECTION: ICD-10-CM

## 2022-01-05 LAB
ALBUMIN SERPL-MCNC: 4.7 G/DL (ref 3.4–5)
ANION GAP SERPL CALCULATED.3IONS-SCNC: 12 MMOL/L (ref 3–16)
BUN BLDV-MCNC: 17 MG/DL (ref 7–20)
CALCIUM SERPL-MCNC: 9.4 MG/DL (ref 8.3–10.6)
CHLORIDE BLD-SCNC: 97 MMOL/L (ref 99–110)
CO2: 28 MMOL/L (ref 21–32)
CREAT SERPL-MCNC: 1.2 MG/DL (ref 0.9–1.3)
CREATININE URINE: 142.7 MG/DL (ref 39–259)
GFR AFRICAN AMERICAN: >60
GFR NON-AFRICAN AMERICAN: >60
GLUCOSE BLD-MCNC: 331 MG/DL (ref 70–99)
MICROALBUMIN UR-MCNC: <1.2 MG/DL
MICROALBUMIN/CREAT UR-RTO: NORMAL MG/G (ref 0–30)
PHOSPHORUS: 3.5 MG/DL (ref 2.5–4.9)
POTASSIUM SERPL-SCNC: 5.1 MMOL/L (ref 3.5–5.1)
SODIUM BLD-SCNC: 137 MMOL/L (ref 136–145)

## 2022-01-05 PROCEDURE — 71046 X-RAY EXAM CHEST 2 VIEWS: CPT

## 2022-01-05 PROCEDURE — 99214 OFFICE O/P EST MOD 30 MIN: CPT | Performed by: FAMILY MEDICINE

## 2022-01-05 RX ORDER — ALBUTEROL SULFATE 90 UG/1
2 AEROSOL, METERED RESPIRATORY (INHALATION) EVERY 6 HOURS PRN
Qty: 18 EACH | Refills: 0 | Status: SHIPPED | OUTPATIENT
Start: 2022-01-05

## 2022-01-05 RX ORDER — LEVOFLOXACIN 750 MG/1
750 TABLET ORAL DAILY
Qty: 7 TABLET | Refills: 0 | Status: SHIPPED | OUTPATIENT
Start: 2022-01-05 | End: 2022-01-12

## 2022-01-05 RX ORDER — GABAPENTIN 600 MG/1
600 TABLET ORAL 3 TIMES DAILY
Qty: 90 TABLET | Refills: 0 | Status: SHIPPED | OUTPATIENT
Start: 2022-01-05 | End: 2022-02-04

## 2022-01-05 RX ORDER — FLUVOXAMINE MALEATE 25 MG
25 TABLET ORAL NIGHTLY
Qty: 30 TABLET | Refills: 1 | Status: SHIPPED | OUTPATIENT
Start: 2022-01-05

## 2022-01-05 RX ORDER — MELOXICAM 15 MG/1
15 TABLET ORAL DAILY PRN
Qty: 30 TABLET | Refills: 2 | Status: SHIPPED | OUTPATIENT
Start: 2022-01-05

## 2022-01-05 ASSESSMENT — ENCOUNTER SYMPTOMS
VOMITING: 0
SINUS PRESSURE: 0
ABDOMINAL PAIN: 0
RHINORRHEA: 0
BACK PAIN: 0
ABDOMINAL DISTENTION: 0
NAUSEA: 0
WHEEZING: 0
COUGH: 1
CHEST TIGHTNESS: 1
TROUBLE SWALLOWING: 0
SHORTNESS OF BREATH: 0
DIARRHEA: 0
CONSTIPATION: 0
BLOOD IN STOOL: 0

## 2022-01-05 ASSESSMENT — PATIENT HEALTH QUESTIONNAIRE - PHQ9
SUM OF ALL RESPONSES TO PHQ QUESTIONS 1-9: 4
1. LITTLE INTEREST OR PLEASURE IN DOING THINGS: 2
SUM OF ALL RESPONSES TO PHQ QUESTIONS 1-9: 4
2. FEELING DOWN, DEPRESSED OR HOPELESS: 2
SUM OF ALL RESPONSES TO PHQ9 QUESTIONS 1 & 2: 4

## 2022-01-06 LAB
ESTIMATED AVERAGE GLUCOSE: 260.4 MG/DL
HBA1C MFR BLD: 10.7 %

## 2022-01-20 ENCOUNTER — HOSPITAL ENCOUNTER (OUTPATIENT)
Dept: CARDIAC REHAB | Age: 57
Setting detail: THERAPIES SERIES
Discharge: HOME | End: 2022-01-20
Payer: COMMERCIAL

## 2022-01-20 PROCEDURE — 94625 PHY/QHP OP PULM RHB W/O MNTR: CPT

## 2022-02-14 ENCOUNTER — TELEPHONE (OUTPATIENT)
Dept: FAMILY MEDICINE CLINIC | Age: 57
End: 2022-02-14

## 2022-02-18 DIAGNOSIS — J06.9 VIRAL URI: ICD-10-CM

## 2022-02-18 RX ORDER — FLUTICASONE PROPIONATE 50 MCG
SPRAY, SUSPENSION (ML) NASAL
Refills: 1 | OUTPATIENT
Start: 2022-02-18

## 2022-02-18 NOTE — TELEPHONE ENCOUNTER
----- Message from Zina Guerin sent at 2/18/2022  3:54 PM EST -----  Subject: Message to Provider    QUESTIONS  Information for Provider? Madison with [employer] is calling to follow up on a   fax that was sent in regard to prescriptions for \"770G pump off label   prescription\" for Dr. Joesph Guevara to fill out and sign for this. Please fax   this to #[phone]. Please call her at #[phone] ext [phone]  ---------------------------------------------------------------------------  --------------  CALL BACK INFO  What is the best way for the office to contact you? OK to leave message on   voicemail  Preferred Call Back Phone Number? [phone]  ---------------------------------------------------------------------------  --------------  SCRIPT ANSWERS  Relationship to Patient? Third Party  Representative Name?  Madison with [employer]

## 2022-03-02 DIAGNOSIS — U09.9 COVID-19 LONG HAULER: ICD-10-CM

## 2022-03-02 DIAGNOSIS — J84.10 POSTINFLAMMATORY PULMONARY FIBROSIS (HCC): ICD-10-CM

## 2022-03-02 DIAGNOSIS — E11.65 UNCONTROLLED TYPE 2 DIABETES MELLITUS WITH HYPERGLYCEMIA (HCC): ICD-10-CM

## 2022-03-02 RX ORDER — GLIMEPIRIDE 4 MG/1
TABLET ORAL
Qty: 60 TABLET | Refills: 2 | OUTPATIENT
Start: 2022-03-02

## 2022-03-02 RX ORDER — FLUVOXAMINE MALEATE 25 MG
25 TABLET ORAL NIGHTLY
Qty: 30 TABLET | Refills: 1 | OUTPATIENT
Start: 2022-03-02

## 2022-03-02 RX ORDER — PIOGLITAZONEHYDROCHLORIDE 45 MG/1
TABLET ORAL
Qty: 30 TABLET | Refills: 2 | OUTPATIENT
Start: 2022-03-02

## 2022-03-08 DIAGNOSIS — E11.65 UNCONTROLLED TYPE 2 DIABETES MELLITUS WITH HYPERGLYCEMIA (HCC): ICD-10-CM

## 2022-03-08 RX ORDER — DAPAGLIFLOZIN AND METFORMIN HYDROCHLORIDE 5; 1000 MG/1; MG/1
TABLET, FILM COATED, EXTENDED RELEASE ORAL
Qty: 60 TABLET | Refills: 2 | OUTPATIENT
Start: 2022-03-08

## 2022-03-28 ENCOUNTER — PATIENT MESSAGE (OUTPATIENT)
Dept: FAMILY MEDICINE CLINIC | Age: 57
End: 2022-03-28

## 2022-03-28 ENCOUNTER — TELEMEDICINE (OUTPATIENT)
Dept: FAMILY MEDICINE CLINIC | Age: 57
End: 2022-03-28
Payer: COMMERCIAL

## 2022-03-28 DIAGNOSIS — R11.2 NAUSEA AND VOMITING IN ADULT: ICD-10-CM

## 2022-03-28 DIAGNOSIS — U09.9 COVID-19 LONG HAULER: ICD-10-CM

## 2022-03-28 DIAGNOSIS — A09 ACUTE INFECTIOUS DIARRHEA: ICD-10-CM

## 2022-03-28 DIAGNOSIS — B34.9 VIRAL INFECTION: Primary | ICD-10-CM

## 2022-03-28 DIAGNOSIS — E11.65 UNCONTROLLED TYPE 2 DIABETES MELLITUS WITH HYPERGLYCEMIA (HCC): ICD-10-CM

## 2022-03-28 DIAGNOSIS — J84.10 POSTINFLAMMATORY PULMONARY FIBROSIS (HCC): ICD-10-CM

## 2022-03-28 DIAGNOSIS — Z71.89 EDUCATED ABOUT 2019 NOVEL CORONAVIRUS INFECTION: ICD-10-CM

## 2022-03-28 PROCEDURE — 99443 PR PHYS/QHP TELEPHONE EVALUATION 21-30 MIN: CPT | Performed by: FAMILY MEDICINE

## 2022-03-28 PROCEDURE — 3046F HEMOGLOBIN A1C LEVEL >9.0%: CPT | Performed by: FAMILY MEDICINE

## 2022-03-28 RX ORDER — DICYCLOMINE HCL 20 MG
20 TABLET ORAL EVERY 8 HOURS PRN
Qty: 30 TABLET | Refills: 2 | Status: SHIPPED | OUTPATIENT
Start: 2022-03-28

## 2022-03-28 RX ORDER — DIPHENOXYLATE HYDROCHLORIDE AND ATROPINE SULFATE 2.5; .025 MG/1; MG/1
1 TABLET ORAL EVERY 6 HOURS PRN
Qty: 20 TABLET | Refills: 0 | Status: SHIPPED | OUTPATIENT
Start: 2022-03-28 | End: 2022-04-02

## 2022-03-28 RX ORDER — ACARBOSE 25 MG/1
25 TABLET ORAL
Qty: 90 TABLET | Refills: 2 | Status: SHIPPED | OUTPATIENT
Start: 2022-03-28 | End: 2022-06-13

## 2022-03-28 RX ORDER — PROCHLORPERAZINE MALEATE 10 MG
10 TABLET ORAL EVERY 8 HOURS PRN
Qty: 15 TABLET | Refills: 0 | Status: SHIPPED | OUTPATIENT
Start: 2022-03-28

## 2022-03-28 ASSESSMENT — ENCOUNTER SYMPTOMS
SINUS PRESSURE: 0
SHORTNESS OF BREATH: 0
VOMITING: 1
COUGH: 0
WHEEZING: 0
BACK PAIN: 1
CHEST TIGHTNESS: 0
CONSTIPATION: 0
ABDOMINAL DISTENTION: 0
NAUSEA: 1
RHINORRHEA: 1
BLOOD IN STOOL: 0
DIARRHEA: 0
TROUBLE SWALLOWING: 0
ABDOMINAL PAIN: 1

## 2022-03-28 NOTE — PROGRESS NOTES
Jos Jackson   49 y.o. male   1965    HPI:    Virtual visit encounter type:   [] Doxy. me  [x] Telephone w/o video  [] MyChart  [] Other:     Patient was last seen by me on 1/5/2022. Reason(s) for visit:   Chief Complaint   Patient presents with    Diarrhea    Nausea & Vomiting    Concern For COVID-19       Patient was seen today for issues noted below. A same day appointment was arranged. History:     Symptom duration: (# of days)  [] 1   [] 2   [x] 3   [] 4   [] 5   [] 6   [] 7   [] 8   [] 9   [] 10   [] 11   [] 12   [] 13 [] 14 + [] None    Symptom course:   [x] Worsening     [] Stable     [] Improving     [] Asymptomatic     COVID-19 test:  [] Positive       [] PCR test      [] Rapid test  [] Negative      [] PCR test      [] Rapid test   [x] Not performed    Influenza test:   [] Positive   [] Negative  [x] Not performed    Other historical relevant factors:  [x] Exposure to known sick contacts: family and co-workers with similar symptoms  [] Social gatherings:    [] Close contact with a lab confirmed COVID-19 patient within 14 days of symptom onset  [] History of travel from affected geographical areas within 14 days of symptom onset  [] Health care worker exposure w/ no symptoms  [] Health care worker exposure symptomatic    COVID-19 vaccination status:  [x] Vaccinated  [] Unvaccinated    Influenza vaccination status:  [x] Vaccinated  [] Unvaccinated    Immunization History   Administered Date(s) Administered    COVID-19, Pfizer Purple top, DILUTE for use, 12+ yrs, 30mcg/0.3mL dose 03/25/2021, 04/15/2021, 12/03/2021    Hepatitis A 03/28/2008, 10/08/2008    Hepatitis B 03/28/2008, 06/13/2008, 10/08/2008    Pneumococcal Polysaccharide ([id number]) 01/18/2021    Tdap (Boostrix, Adacel) 03/28/2008       Symptoms:  Review of Systems   Constitutional: Positive for activity change, fatigue and fever. Negative for appetite change and unexpected weight change. HENT: Positive for rhinorrhea. Negative for congestion, sinus pressure and trouble swallowing. Respiratory: Negative for cough, chest tightness, shortness of breath and wheezing. Cardiovascular: Negative for chest pain, palpitations and leg swelling. Gastrointestinal: Positive for abdominal pain, nausea and vomiting. Negative for abdominal distention, blood in stool, constipation and diarrhea. Genitourinary: Negative for dysuria, frequency and hematuria. Musculoskeletal: Positive for arthralgias, back pain and myalgias. Skin: Negative for rash. Neurological: Positive for weakness. Negative for dizziness, light-headedness, numbness and headaches. Psychiatric/Behavioral: Positive for sleep disturbance. [] OTHER SYMPTOMS:      Medications used:  [] NSAID:             [] Ibuprofen (Motrin) - last used:             [] Naproxen (Aleve) - last used:              [] Meloxicam (Mobic)             [] Celecoxib (Celebrex)    [] Tylenol - last used:   [] Anti-tussives -  [] Anti-emetics -  [] Nasal spray -  [] Other:   [x] None    Medical risk factors:  [] Pregnant or possibly pregnant  [] Age 72 and older  [x] Diabetes  [] Heart disease  [] Asthma  [] COPD/Other chronic lung diseases  [] Active cancer  [] On chemotherapy/immunosuppressive drugs  [] Currently or recent history of taking oral corticosteroids  [] History of lymphoma/leukemia  [] History of tobacco smoking  [] Current tobacco smoker  [] Obesity  [] Other:    COVID-19:  -Clinical status - chronic  -Tested positive around 12/8/2022  -No issues of increased SOB with taking Trelegy daily. Type II diabetes mellitus:  Regarding diabetes, patient is on medications as noted below in his medication list. Since the last visit. ..  -Last A1c =    Lab Results   Component Value Date    LABA1C 10.7 01/05/2022    LABA1C 9.7 08/03/2021    LABA1C 10.5 01/19/2021     Lab Results   Component Value Date    LABMICR <1.20 01/05/2022    LDLCALC 90 01/19/2021    CREATININE 1.2 01/05/2022 -Glucose readings (on average): fasting: variable, but seeing at least 170 if not 220+  -Nighttime awakenings: none reported  -Neuropathy symptoms: yes  -Gastroparesis symptoms: not chronic issue   -Medication adherence: sometimes compliant - increased his Lantus on his own to eventually 22 units BID. He has been lost to follow up. Allergies   Allergen Reactions    Indomethacin      diarrhea       Current Outpatient Medications on File Prior to Visit   Medication Sig Dispense Refill    albuterol sulfate  (90 Base) MCG/ACT inhaler Inhale 2 puffs into the lungs every 6 hours as needed for Wheezing or Shortness of Breath 18 each 0    meloxicam (MOBIC) 15 MG tablet Take 1 tablet by mouth daily as needed for Pain (arthritis) 30 tablet 2    gabapentin (NEURONTIN) 600 MG tablet Take 1 tablet by mouth 3 times daily for 30 days.  90 tablet 0    fluvoxaMINE (LUVOX) 25 MG tablet Take 1 tablet by mouth nightly 30 tablet 1    Insulin Pen Needle 32G X 5 MM MISC 1 each by Does not apply route daily 50 each 3    fluticasone-umeclidin-vilant (TRELEGY ELLIPTA) 100-62.5-25 MCG/INH AEPB Inhale 1 puff into the lungs daily 60 each 2    vitamin D (ERGOCALCIFEROL) 1.25 MG (90504 UT) CAPS capsule Take 1 capsule by mouth once a week (Patient not taking: Reported on 1/5/2022) 4 capsule 0    benzonatate (TESSALON) 200 MG capsule Take 1 capsule by mouth every 8 hours as needed for Cough (Patient not taking: Reported on 1/5/2022) 15 capsule 1    brompheniramine-pseudoephedrine-DM 2-30-10 MG/5ML syrup Take 5 mLs by mouth every 6 hours as needed for Congestion or Cough 118 mL 0    Nasal Dilators (BREATHE RIGHT EXTRA STRENGTH) STRP 1 strip by Does not apply route nightly as needed (nasal congestion) 8 strip 2    fluticasone (FLONASE) 50 MCG/ACT nasal spray 2 sprays by Each Nostril route daily 16 g 1    guaiFENesin (MUCINEX MAXIMUM STRENGTH) 1200 MG TB12 Take 1 tablet PO twice daily 60 tablet 2    Humidifiers (COOL MIST HUMIDIFIER) MISC 1 each by Does not apply route daily as needed (sinus congestion) 1 each 0    Dulaglutide (TRULICITY) 4.5 ID/1.7AW SOPN Inject 4.5 mg into the skin once a week (Patient not taking: Reported on 1/5/2022) 4 pen 2    glimepiride (AMARYL) 4 MG tablet Take 1 tablet by mouth 2 times daily 180 tablet 0    Dapagliflozin-metFORMIN HCl ER (XIGDUO XR) 5-1000 MG TB24 TAKE 1 TABLET BY MOUTH TWICE A  tablet 0    Insulin Degludec (TRESIBA FLEXTOUCH) 100 UNIT/ML SOPN Inject 15 Units into the skin nightly 3 pen 2    pioglitazone (ACTOS) 45 MG tablet Take 1 tablet by mouth every morning 90 tablet 0    potassium chloride (KLOR-CON M) 20 MEQ extended release tablet Take 1 tablet by mouth daily 30 tablet 0    Continuous Blood Gluc Transmit (DEXCOM G6 TRANSMITTER) MISC CHANGE EVERY 3 MONTHS. 1 each 3    diclofenac sodium (VOLTAREN) 1 % GEL Apply 2 g topically every 6 hours as needed for Pain (back pain) 150 g 2    Continuous Blood Gluc Sensor (DEXCOM G6 SENSOR) MISC Change every 10 days. 1 each 11     No current facility-administered medications on file prior to visit.         Family History   Problem Relation Age of Onset    Cancer Father 39        colon    Diabetes Father     Alzheimer's Disease Father     Diabetes Mother     Diabetes Maternal Aunt     Cancer Sister         breast       Social History     Tobacco Use    Smoking status: Never Smoker    Smokeless tobacco: Never Used   Substance Use Topics    Alcohol use: No     Alcohol/week: 0.0 standard drinks        Lab Results   Component Value Date    WBC 11.4 (H) 06/23/2015    HGB 15.3 06/23/2015    HCT 47.0 06/23/2015    MCV 89.5 06/23/2015     06/23/2015       Chemistry        Component Value Date/Time     01/05/2022 1322    K 5.1 01/05/2022 1322    CL 97 (L) 01/05/2022 1322    CO2 28 01/05/2022 1322    BUN 17 01/05/2022 1322    CREATININE 1.2 01/05/2022 1322        Component Value Date/Time    CALCIUM 9.4 01/05/2022 1322 ALKPHOS 117 06/17/2020 1221    AST 20 06/17/2020 1221    ALT 41 (H) 06/17/2020 1221    BILITOT 0.7 06/17/2020 1221          Lab Results   Component Value Date    ALT 41 (H) 06/17/2020    AST 20 06/17/2020    ALKPHOS 117 06/17/2020    BILITOT 0.7 06/17/2020     Lab Results   Component Value Date    LABA1C 10.7 01/05/2022     Lab Results   Component Value Date    .4 01/05/2022       Review of systems:  As noted above. Otherwise, rest of ROS is negative. Wt Readings from Last 3 Encounters:   01/05/22 189 lb (85.7 kg)   08/30/21 207 lb (93.9 kg)   08/02/21 209 lb (94.8 kg)       BP Readings from Last 3 Encounters:   01/05/22 118/84   08/30/21 110/62   08/02/21 110/82       Pulse Readings from Last 3 Encounters:   01/05/22 111   08/30/21 97   08/02/21 110       There were no vitals taken for this visit. Physical Exam   Unable to perform due to telephone encounter      Assessment/Plan:   Constantine Kraus was seen today for diarrhea, nausea & vomiting and concern for covid-19. Diagnoses and all orders for this visit:    Viral infection  Comments:  Based on his symptoms, likely has viral issue. Ordered COVID-19 test.    Acute infectious diarrhea  -     dicyclomine (BENTYL) 20 MG tablet; Take 1 tablet by mouth every 8 hours as needed (abdominal cramping)  -     diphenoxylate-atropine (LOMOTIL) 2.5-0.025 MG per tablet; Take 1 tablet by mouth every 6 hours as needed for Diarrhea for up to 5 days. Nausea and vomiting in adult  -     prochlorperazine (COMPAZINE) 10 MG tablet; Take 1 tablet by mouth every 8 hours as needed (nausea)    Uncontrolled type 2 diabetes mellitus with hyperglycemia ([address]  Comments:  Diabetes is still uncontrolled. Advised to increase Tresiba from 22 units BID to max of 32 units BID. Increase Tresiba by total 2 units each day. Will add Acarbose. Will start on rapid acting insulin at least 3 units TID and make adjustments accordingly.      I discussed with patient that having diabetes requires a major lifestyle change. Extensive counseling was given to the patient, who was informed of the microvascular and macrovascular complications of diabetes: increased risk of CAD, MI, CVA, CKD/ESRD (leading to dialysis), diabetic retinopathy, diabetic gastroparesis, diabetic neuropathy, chronic wound infections (eg osteomyelitis), etc.      Patient was informed that diabetics need to be seen every 3 months for routine A1c blood testing and that the goal of A1c is under 7.0 (and under 6.5 for more aggressive treatment) for most patients and under 8.5 for the elderly. Patient was counseled also to take the medications as prescribed and to check glucose as directed, especially if they're symptomatic (malaise, weak, fatigue, clammy, dizzy, etc.) and to keep food/beverage with him at all times in case his glucose is low. Patient was also advised, especially in the setting of severe neuropathy with numbness to never walk around barefoot. Informed patient of benefits of being on GLP1 & SGLT2 therapy that go beyond glycemic control - low risk of hypoglycemia, weight loss, decreased risk of cardiovascular complications, etc.  Orders:  -     acarbose (PRECOSE) 25 MG tablet; Take 1 tablet by mouth 3 times daily (with meals)    COVID-19 long hauler  Comments:  Patient may possibly ahve another case of COVID-19. Will continue Trelegy daily and Albuterol PRN. Postinflammatory pulmonary fibrosis (HCC)  Comments:  Patient may possibly ahve another case of COVID-19. Will continue Trelegy daily and Albuterol PRN. Educated about 2019 novel coronavirus infection      Discussion about:  [x] Supportive care - hydration with plenty of water and/or Gatorade/Powerade or nutritional shakes (e.g. Ensure, Boost, etc.)  [x] Monitor temperature and vital signs. [] Informed COVID-19 is a viral infection. Antibiotics are not recommended in the treatment of viral infections (routinely).     [] Monoclonal antibody - patient vocalized understanding that this therapy was approved by FDA under [employer] and gave permission for approval to be scheduled an appointment for an infusion. Recommendations:  [x] Advised patient to notify anyone that they have been in close contact with about their symptoms. [x] Recommended quarantining at least 5 days (per current CDC guidelines) from either day of exposure or from onset of symptoms. [x] Informed patient that having a COVID-19 test done prior to day 5 (of either exposure or symptom onset) could lead to a false negative result and that testing would be recommended (preferably with a PCR test) on day 5 or later. [] Recommended obtaining a pulse oximeter (if unavailable) to measure oxygen saturation. [] Recommended using an incentive spirometer and deep breathing exercises to help prevent atelectasis. [] Patient was offered as needed medications (inhaler, anti-tussive, anti-emetic, etc.) but declined. Other:  [x] Notification to work/school letter provided to patient - patient was advised to quarantine for at least 5 days. He stated that he can't remain off of work for too long b/c work is \"short\" and several co-workers have called off. I reviewed the plan of care with the patient. Patient acknowledged understanding and agreed with plan of care overall. Medications Discontinued During This Encounter   Medication Reason    prochlorperazine (COMPAZINE) 10 MG tablet LIST CLEANUP        General information on medications:  -When it comes to medications, whether with starting or adding a new medication or increasing the dose of a current medication, the benefits and risks have to always be considered and weighed over, especially if one is taking other medications as well.    -There are no medications that have no side effects and that there is always a risk involved with taking a medication.    -If a side effect were to occur with starting a new medication or with increasing the dose of a current medication that either the medication can be totally discontinued altogether or simply decrease the dose of it and if this would be the case a follow-up appointment would be deemed necessary.    -The drug allergy list will then be updated with the corresponding side effect(s) if it's deemed to be a true 'drug allergy'. -The most common adverse effects of medication(s) were addressed at today's visit.    -Lastly, the coverage status of a medication may vary from insurance to insurance and the only way to verify if the medication is covered is to send an actual prescription in.    -The drug formulary of each insurance changes without any warning or notification to the healthcare provider let alone the pharmacy.  -The cost of medications vary from insurance to insurance and the cost is always subject to change just like the drug formulary. General disclaimer regarding telemedicine (virtual) visits:  Ravindra Salcido is a 49 y.o. male is being evaluated by a virtual visit (video visit) and/or telephone (without video) encounter to address their concern(s) as mentioned above. Prior to arranging this appointment, the patient was made aware of the need and importance to schedule the visit in this manner given the current ongoing COVID-19 pandemic. The patient was also made aware that the telemedicine service used (e.g.doxy. me) would not save let alone record any information (audio, video, and text) regarding the actual virtual visit. After this discussion, the patient acknowledged understanding and agreed to today's virtual visit encounter. A caregiver was present when appropriate as well as an  if requested. The patient is aware that telemedicine visits are a billable service just like an in person visit is. The patient was located in a state where the healthcare provider was licensed to provide care.     Due to this being a TeleHealth encounter (during the OEICJ-86 public health emergency), evaluation of the following organ systems was overall limited: Vitals/Constitutional/EENT/Resp/CV/GI//MS/Neuro/Skin/Heme-Lymph-Imm. As a result, establishing a diagnosis(s) and formulating a plan of care may be overall more difficult and complicated compared to a conventional (face-to-face) office visit. The patient was made aware about the potential limitations and difficulties of a telemedicine visit such as having technical difficulties related to video and/or audio issues often stemming from a sub-optimal/poor Internet or cellular data connection and/or other factors. If this is judged to be the case then the patient would be informed if deemed relevant and necessary that an in-person follow-up visit may be recommended. Pursuant to the emergency declaration under the [address] [place] [address], [address] [place] [address] authority and the [employer] Act, this virtual visit was conducted with the patient's (and/or legal guardian's) consent, to reduce the patient's risk (as well as others) of exposure to COVID-19 and to continue to maintain and provide necessary medical care. The patient (and/or legal guardian) has also been advised to contact this office for worsening conditions or problems, and seek emergency medical treatment and/or call 911 if deemed necessary. Follow-up: Return in about 4 weeks (around 4/25/2022) for A1c check - diabetes. .     Patient was informed that if his or her symptoms worsen to follow up with me sooner or go to the nearest ER if the symptoms are very significant and warrant higher level of care.     Regarding my note:  -This note was composed (by me only and not with assistance via a scribe) to the best of my knowledge and recollection of the encounter with the patient using one of my own customized note templates utilizing a combination of typing and dictating with the [employer] medical speech recognition software. As a result, the note may possibly contain various errors (e.g. spelling, grammar, and non-sensible words/phrases/statements) despite reviewing the note prior to signing it for completion. Time spent includes some or all of the following, both face-to-face time and non face-to-face time, but is not limited to:  [x] Preparing to see the patient by reviewing medical records available (notes, labs, imaging, etc.) prior to seeing the patient. [x] Obtaining and/or reviewing the history from the patient. [x] Performing a medically appropriate examination. [x] Ordering of relevant lab work, medications, referrals, or procedures. [x] Discussing patient's medical issues and formulating an assessment and plan. [x] Reviewing plan of care with patient. Answering any questions or concerns. [x] Documentation within the electronic health record (EHR)  [x] Reviewing records of history relevant to patient's issues after seeing the patient. [] Discussion or coordination of care with other health care professionals  [x] Other: length of office visit: 25 minutes     Armando Joseph M.D.   [address] [place] [address]    Electronically signed by Mare Pena M.D. on 3/28/2022 at 6:30 PM.

## 2022-03-28 NOTE — TELEPHONE ENCOUNTER
From: Lio Bonilla  To: Dr. Tyler Doe: 3/28/2022 7:17 AM EDT  Subject: Need letter for work I am sick with the 2 day flu    Headache   Running nose  Exhausted   Some vomiting   Diarrhea     I was sick all weekend but today I am sick Monday 28th need letter to show I am sick.     I am just going to sleep all day     My job is difficult about taking time off     Thanks so much    [employer] [facility]

## 2022-03-29 DIAGNOSIS — B34.9 VIRAL INFECTION: ICD-10-CM

## 2022-03-30 LAB — SARS-COV-2: NOT DETECTED

## 2022-04-16 DIAGNOSIS — E11.65 UNCONTROLLED TYPE 2 DIABETES MELLITUS WITH HYPERGLYCEMIA (HCC): ICD-10-CM

## 2022-04-16 DIAGNOSIS — U09.9 COVID-19 LONG HAULER: ICD-10-CM

## 2022-04-16 DIAGNOSIS — J84.10 POSTINFLAMMATORY PULMONARY FIBROSIS (HCC): ICD-10-CM

## 2022-04-18 RX ORDER — PIOGLITAZONEHYDROCHLORIDE 45 MG/1
TABLET ORAL
Qty: 30 TABLET | Refills: 2 | OUTPATIENT
Start: 2022-04-18

## 2022-04-18 RX ORDER — FLUVOXAMINE MALEATE 25 MG
25 TABLET ORAL NIGHTLY
Qty: 30 TABLET | Refills: 1 | OUTPATIENT
Start: 2022-04-18

## 2022-04-18 RX ORDER — GLIMEPIRIDE 4 MG/1
TABLET ORAL
Qty: 60 TABLET | Refills: 2 | OUTPATIENT
Start: 2022-04-18

## 2022-04-21 DIAGNOSIS — E11.65 UNCONTROLLED TYPE 2 DIABETES MELLITUS WITH HYPERGLYCEMIA (HCC): ICD-10-CM

## 2022-04-21 RX ORDER — ACARBOSE 25 MG/1
TABLET ORAL
Qty: 90 TABLET | Refills: 2 | OUTPATIENT
Start: 2022-04-21

## 2022-04-22 DIAGNOSIS — E11.65 UNCONTROLLED TYPE 2 DIABETES MELLITUS WITH HYPERGLYCEMIA (HCC): Primary | ICD-10-CM

## 2022-05-11 ENCOUNTER — PATIENT MESSAGE (OUTPATIENT)
Dept: FAMILY MEDICINE CLINIC | Age: 57
End: 2022-05-11

## 2022-05-11 DIAGNOSIS — E11.9 TYPE 2 DIABETES MELLITUS WITHOUT COMPLICATION, WITHOUT LONG-TERM CURRENT USE OF INSULIN (HCC): ICD-10-CM

## 2022-05-11 RX ORDER — BLOOD-GLUCOSE SENSOR
EACH MISCELLANEOUS
Qty: 1 EACH | Refills: 11 | Status: SHIPPED | OUTPATIENT
Start: 2022-05-11

## 2022-05-11 RX ORDER — BLOOD-GLUCOSE TRANSMITTER
EACH MISCELLANEOUS
Qty: 1 EACH | Refills: 3 | Status: SHIPPED | OUTPATIENT
Start: 2022-05-11

## 2022-05-11 NOTE — TELEPHONE ENCOUNTER
From: Severiano Almazan  To: Dr. Manuela aHrt: 5/11/2022 2:34 PM EDT  Subject: Moved up appt 1:50 next Wednesday     So my insurance will lapse due to I am moving to [facility].  I am out of sensors have my last one on    So I would like you to call in for Dexcom sensor and transmitter I will get  Endo to prescribe the tandem pump which connects to the Dexcom you have filled it at [facility] before thanks [employer]

## 2022-05-12 DIAGNOSIS — E11.65 UNCONTROLLED TYPE 2 DIABETES MELLITUS WITH HYPERGLYCEMIA (HCC): ICD-10-CM

## 2022-05-12 RX ORDER — PIOGLITAZONEHYDROCHLORIDE 30 MG/1
TABLET ORAL
Qty: 30 TABLET | Refills: 2 | OUTPATIENT
Start: 2022-05-12

## 2022-05-14 NOTE — TELEPHONE ENCOUNTER
----- Message from [facility] PAULO GUSMAN sent at 4/30/2021  4:36 PM EDT -----  Subject: Message to Provider    QUESTIONS  Information for Provider? He called back about scheduling an MRI for the   bone spurs doctor reported on his X-Ray.  ---------------------------------------------------------------------------  --------------  [address]  What is the best way for the office to contact you? OK to leave message on   voicemail  Preferred Call Back Phone Number? [phone]  ---------------------------------------------------------------------------  --------------  SCRIPT ANSWERS  Relationship to Patient?  Self markedly decreased bs lower 2/3 L lung R lung cta

## 2022-05-18 DIAGNOSIS — E11.65 UNCONTROLLED TYPE 2 DIABETES MELLITUS WITH HYPERGLYCEMIA (HCC): ICD-10-CM

## 2022-05-19 RX ORDER — INSULIN DEGLUDEC INJECTION 100 U/ML
15 INJECTION, SOLUTION SUBCUTANEOUS NIGHTLY
Refills: 2 | OUTPATIENT
Start: 2022-05-19

## 2022-06-07 DIAGNOSIS — E11.65 UNCONTROLLED TYPE 2 DIABETES MELLITUS WITH HYPERGLYCEMIA (HCC): ICD-10-CM

## 2022-06-07 RX ORDER — PIOGLITAZONEHYDROCHLORIDE 45 MG/1
TABLET ORAL
Qty: 90 TABLET | Refills: 0 | Status: SHIPPED | OUTPATIENT
Start: 2022-06-07

## 2022-06-10 DIAGNOSIS — E11.65 UNCONTROLLED TYPE 2 DIABETES MELLITUS WITH HYPERGLYCEMIA (HCC): ICD-10-CM

## 2022-06-10 RX ORDER — INSULIN DEGLUDEC INJECTION 100 U/ML
15 INJECTION, SOLUTION SUBCUTANEOUS NIGHTLY
Qty: 15 ML | Refills: 0 | Status: SHIPPED | OUTPATIENT
Start: 2022-06-10

## 2022-06-10 NOTE — TELEPHONE ENCOUNTER
Medication:   Requested Prescriptions     Pending Prescriptions Disp Refills    TRESIBA FLEXTOUCH 100 UNIT/ML SOPN [Pharmacy Med Name: Maximo Jade 100 UNIT/ML]  2     Sig: INJECT 15 UNITS INTO THE SKIN NIGHTLY       Last Filled:  2/8/2022    Patient Phone Number: [phone] (home)     Last appt: 3/28/2022   Next appt: Visit date not found    Last Labs DM:   Lab Results   Component Value Date    LABA1C 10.7 01/05/2022

## 2022-06-11 DIAGNOSIS — E11.65 UNCONTROLLED TYPE 2 DIABETES MELLITUS WITH HYPERGLYCEMIA (HCC): ICD-10-CM

## 2022-06-13 RX ORDER — ACARBOSE 25 MG/1
TABLET ORAL
Qty: 90 TABLET | Refills: 0 | Status: SHIPPED | OUTPATIENT
Start: 2022-06-13

## 2022-06-13 NOTE — TELEPHONE ENCOUNTER
Medication:   Requested Prescriptions     Pending Prescriptions Disp Refills    acarbose (PRECOSE) 25 mg tablet [Pharmacy Med Name: ACARBOSE 25 MG TABLET] 90 tablet 2     Sig: TAKE 1 TABLET BY MOUTH 3 TIMES DAILY WITH MEALS       Last Filled:  5/18/2022    Patient Phone Number: [phone] (home)     Last appt: 3/28/2022   Next appt: Visit date not found    Last Labs DM:   Lab Results   Component Value Date    LABA1C 10.7 01/05/2022

## 2022-06-22 ENCOUNTER — TELEPHONE (OUTPATIENT)
Dept: FAMILY MEDICINE CLINIC | Age: 57
End: 2022-06-22

## 2022-06-22 RX ORDER — BLOOD-GLUCOSE SENSOR
EACH MISCELLANEOUS
COMMUNITY

## 2022-06-22 RX ORDER — BLOOD-GLUCOSE TRANSMITTER
EACH MISCELLANEOUS
COMMUNITY

## 2022-07-13 DIAGNOSIS — E11.65 UNCONTROLLED TYPE 2 DIABETES MELLITUS WITH HYPERGLYCEMIA (HCC): ICD-10-CM

## 2022-07-13 RX ORDER — ACARBOSE 25 MG/1
TABLET ORAL
Qty: 90 TABLET | Refills: 0 | OUTPATIENT
Start: 2022-07-13

## 2022-08-02 ENCOUNTER — PATIENT MESSAGE (OUTPATIENT)
Dept: INTERNAL MEDICINE CLINIC | Age: 57
End: 2022-08-02

## 2023-03-01 ENCOUNTER — HOSPITAL ENCOUNTER (OUTPATIENT)
Dept: GENERAL RADIOLOGY | Age: 58
Discharge: HOME | End: 2023-03-01
Payer: COMMERCIAL

## 2023-03-01 ENCOUNTER — OFFICE VISIT (OUTPATIENT)
Dept: FAMILY MEDICINE CLINIC | Age: 58
End: 2023-03-01
Payer: COMMERCIAL

## 2023-03-01 VITALS
HEART RATE: 122 BPM | OXYGEN SATURATION: 99 % | DIASTOLIC BLOOD PRESSURE: 88 MMHG | WEIGHT: 216 LBS | BODY MASS INDEX: 29.29 KG/M2 | SYSTOLIC BLOOD PRESSURE: 122 MMHG | TEMPERATURE: 97.5 F

## 2023-03-01 DIAGNOSIS — F41.9 ANXIETY: ICD-10-CM

## 2023-03-01 DIAGNOSIS — J84.10 POSTINFLAMMATORY PULMONARY FIBROSIS (HCC): ICD-10-CM

## 2023-03-01 DIAGNOSIS — E11.65 UNCONTROLLED TYPE 2 DIABETES MELLITUS WITH HYPERGLYCEMIA (HCC): ICD-10-CM

## 2023-03-01 DIAGNOSIS — R07.9 CHEST PAIN, UNSPECIFIED TYPE: ICD-10-CM

## 2023-03-01 DIAGNOSIS — R07.9 CHEST PAIN, UNSPECIFIED TYPE: Primary | ICD-10-CM

## 2023-03-01 LAB
CHP ED QC CHECK: NORMAL
GLUCOSE BLD-MCNC: 215 MG/DL

## 2023-03-01 PROCEDURE — 99204 OFFICE O/P NEW MOD 45 MIN: CPT | Performed by: FAMILY MEDICINE

## 2023-03-01 PROCEDURE — 82962 GLUCOSE BLOOD TEST: CPT | Performed by: FAMILY MEDICINE

## 2023-03-01 PROCEDURE — 71046 X-RAY EXAM CHEST 2 VIEWS: CPT

## 2023-03-01 PROCEDURE — 93000 ELECTROCARDIOGRAM COMPLETE: CPT | Performed by: FAMILY MEDICINE

## 2023-03-01 RX ORDER — DOXYCYCLINE HYCLATE 100 MG/1
CAPSULE ORAL
Status: ON HOLD | COMMUNITY
Start: 2023-02-17 | End: 2023-03-09

## 2023-03-01 RX ORDER — BOLUS INSULIN PUMP, 200 UNIT 2 UNIT
EACH MISCELLANEOUS
COMMUNITY
Start: 2023-02-03

## 2023-03-01 RX ORDER — CLINDAMYCIN PHOSPHATE 10 MG/ML
SOLUTION TOPICAL
Status: ON HOLD | COMMUNITY
Start: 2023-02-17 | End: 2023-03-09

## 2023-03-01 RX ORDER — CLINDAMYCIN PHOSPHATE 11.9 MG/ML
1 SOLUTION TOPICAL 2 TIMES DAILY
COMMUNITY
End: 2023-03-01

## 2023-03-01 RX ORDER — NYSTATIN 10B UNIT
1 POWDER (EA) MISCELLANEOUS DAILY
Status: ON HOLD | COMMUNITY
End: 2023-03-09

## 2023-03-01 ASSESSMENT — ENCOUNTER SYMPTOMS
ABDOMINAL PAIN: 0
CONSTIPATION: 0
DIARRHEA: 0
CHEST TIGHTNESS: 0
COUGH: 0
BLOOD IN STOOL: 0
SHORTNESS OF BREATH: 0
WHEEZING: 0

## 2023-03-01 ASSESSMENT — PATIENT HEALTH QUESTIONNAIRE - PHQ9
2. FEELING DOWN, DEPRESSED OR HOPELESS: 0
4. FEELING TIRED OR HAVING LITTLE ENERGY: 0
7. TROUBLE CONCENTRATING ON THINGS, SUCH AS READING THE NEWSPAPER OR WATCHING TELEVISION: 1
9. THOUGHTS THAT YOU WOULD BE BETTER OFF DEAD, OR OF HURTING YOURSELF: 0
SUM OF ALL RESPONSES TO PHQ QUESTIONS 1-9: 2
5. POOR APPETITE OR OVEREATING: 0
8. MOVING OR SPEAKING SO SLOWLY THAT OTHER PEOPLE COULD HAVE NOTICED. OR THE OPPOSITE, BEING SO FIGETY OR RESTLESS THAT YOU HAVE BEEN MOVING AROUND A LOT MORE THAN USUAL: 0
3. TROUBLE FALLING OR STAYING ASLEEP: 0
6. FEELING BAD ABOUT YOURSELF - OR THAT YOU ARE A FAILURE OR HAVE LET YOURSELF OR YOUR FAMILY DOWN: 1
SUM OF ALL RESPONSES TO PHQ QUESTIONS 1-9: 2

## 2023-03-01 NOTE — PROGRESS NOTES
SUBJECTIVE:  Darlene Yusra   1965   male   Allergies   Allergen Reactions    Indomethacin      diarrhea       Chief Complaint   Patient presents with    Chest Pain     X 1 + month feels like elephant on chest.pain is constant    Emesis     Dark red blood today        Patient Active Problem List    Diagnosis Date Noted    Non compliance w medication regimen 07/31/2017    Carpal tunnel syndrome of left wrist 04/27/2017    Osteoarthritis of spine with radiculopathy, cervical region 04/27/2017    Cervical radiculopathy 04/26/2017    Neck pain 04/26/2017    Type 2 diabetes mellitus without complication, without long-term current use of insulin ([facility] [address]     Family history of colon cancer 01/10/2014    Elevated cholesterol 02/04/2013    Elevated fasting blood sugar 02/04/2013    Gout 05/26/2012    Mixed anxiety and depressive disorder 07/06/2011    Obstructive sleep apnea- mild 07/06/2011       HPI   Patient with history of uncontrolled type 2 diabetes and hyperlipidemia is here today with complaints of chest tightness/pain and possibly some anxiety and a remote history of postinflammatory pulmonary fibrosis. Patient reports for the last 6 weeks or possibly slightly more he has had a feeling of tightness in his chest.  Patient reports this initially started with some acute upper respiratory symptoms or an inhaler/breathing treatment was needed. Acute symptoms have resolved but he is still complaining of and ongoing tightness in his chest.  Patient describes the feeling as tightness across his chest and possibly inside his lungs or chest cavity. There is no associated URI symptoms or cough or wheezing or shortness of breath. There is no nausea vomiting or pain radiating to arm neck or jaw. Pain is not exacerbated by exertion. Patient denies any GI or GERD. complaints. He is currently followed by endocrinology for diabetes which is not under good control. Last hemoglobin A1c was at 10.7.   Patient reports that he works for a company where there is a lot of stress involved with his job. 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 That does cause some anxiety. He also works part-time for [employer]. His job there includes doing a lot of lifting. He does not recall any specific episode of lifting that caused his symptoms. He did have a chest x-ray done in January which showed base opacity involving middle lobe. This was thought to possibly represent atelectasis or patchy infiltrate. A short term follow-up was advised. No fever or chills.   Past Medical History:   Diagnosis Date    Depression     Gout     Hyperlipidemia     Type II or unspecified type diabetes mellitus without mention of complication, not stated as uncontrolled     Unspecified sleep apnea      Social History     Socioeconomic History    Marital status:      Spouse name: Not on file    Number of children: 2    Years of education: Not on file    Highest education level: Not on file   Occupational History    Occupation:    Tobacco Use    Smoking status: Never    Smokeless tobacco: Never   Substance and Sexual Activity    Alcohol use: No     Alcohol/week: 0.0 standard drinks    Drug use: No    Sexual activity: Not on file   Other Topics Concern    Not on file   Social History Narrative    Not on file     Social Determinants of Health     Financial Resource Strain: Not on file   Food Insecurity: Not on file   Transportation Needs: Not on file   Physical Activity: Not on file   Stress: Not on file   Social Connections: Not on file   Intimate Partner Violence: Not on file   Housing Stability: Not on file     Family History   Problem Relation Age of Onset    Cancer Father 39        colon    Diabetes Father     Alzheimer's Disease Father     Diabetes Mother     Diabetes Maternal Aunt     Cancer Sister         breast       Review of Systems   Constitutional:  Negative for activity change, appetite change and unexpected weight change. Respiratory:  Negative for cough, chest tightness, shortness of breath and wheezing. Cardiovascular:  Positive for chest pain. Negative for palpitations and leg swelling. Gastrointestinal:  Negative for abdominal pain, blood in stool, constipation and diarrhea. Musculoskeletal:  Negative for arthralgias and myalgias. Skin:  Negative for rash. Neurological:  Negative for light-headedness and headaches. Hematological:  Negative for adenopathy. Does not bruise/bleed easily. Psychiatric/Behavioral:  Negative for dysphoric mood, sleep disturbance and suicidal ideas. The patient is nervous/anxious. OBJECTIVE:  /88 (Site: Right Upper Arm)   Pulse (!) 122   Temp 97.5 °F (36.4 °C)   Wt 216 lb (98 kg)   SpO2 99%   BMI 29.29 kg/m²   Physical Exam  Constitutional:       Appearance: He is well-developed. Eyes:      Pupils: Pupils are equal, round, and reactive to light. Neck:      Thyroid: No thyromegaly. Vascular: No JVD. Cardiovascular:      Rate and Rhythm: Normal rate and regular rhythm. Pulmonary:      Effort: Pulmonary effort is normal. No respiratory distress. Breath sounds: Normal breath sounds. No wheezing or rales. Abdominal:      General: Bowel sounds are normal.      Palpations: Abdomen is soft. Tenderness: There is no abdominal tenderness. Musculoskeletal:      Cervical back: Normal range of motion and neck supple. Comments: Patient does have discomfort which is very similar to his complaints with palpation across anterior chest wall. There is no gross bony abnormalities or skin changes or bruising   Skin:     Findings: No rash. Neurological:      General: No focal deficit present. Mental Status: He is alert and oriented to person, place, and time. Psychiatric:         Behavior: Behavior normal.         Thought Content: Thought content normal.       ASSESSMENT/PLAN:    1.  Chest pain, unspecified type  Symptoms seem to be more musculoskeletal    Chest x-ray and stress echo to rule out cardiovascular or pulmonary etiology    To ED if worsening pain or shortness of breath  - EKG 12 Lead  - POCT Glucose  - XR CHEST STANDARD (2 VW); Future  - Echocardiogram stress test; Future    2. Uncontrolled type 2 diabetes mellitus with hyperglycemia ([address]  Reviewed recent labs and current treatment with endocrinology    3. Postinflammatory pulmonary fibrosis (HCC)  Reviewed last chest x-ray. We have discussed if above testing is normal to check PFTs    4. Anxiety  Stress management patient we discussed possible treatment with PCP on follow-up      Orders Placed This Encounter   Procedures    XR CHEST STANDARD (2 VW)     Standing Status:   Future     Number of Occurrences:   1     Standing Expiration Date:   3/1/2024    POCT Glucose    EKG 12 Lead     Order Specific Question:   Reason for Exam?     Answer:   Chest pain    Echocardiogram stress test     Standing Status:   Future     Standing Expiration Date:   4/30/2023     Order Specific Question:   Reason for exam:     Answer:   chest pain     Current Outpatient Medications   Medication Sig Dispense Refill    nystatin (MYCOSTATIN) POWD powder Apply 1 each topically daily      doxycycline hyclate (VIBRAMYCIN) 100 MG capsule TAKE ONE TAB BY MOUTH TWICE A DAY WITH FULL MEAL AND WATER FOR 10 DAYS      CLINDAMYCIN PHOSPHATE,TOPICAL, 1 % SWAB USE TO WIPE AFFECTED AREA ON BACK AND CHEST TWICE A DAY      CEQUR SIMPLICITY 2U ANIVAL       Continuous Blood Gluc Sensor (DEXCOM G6 SENSOR) MISC by Other route      Continuous Blood Gluc Transmit (DEXCOM G6 TRANSMITTER) MISC by Other route      Continuous Blood Gluc Transmit (DEXCOM G6 TRANSMITTER) MISC CHANGE EVERY 3 MONTHS. 1 each 3    Continuous Blood Gluc Sensor (DEXCOM G6 SENSOR) MISC Change every 10 days.  1 each 11    albuterol sulfate  (90 Base) MCG/ACT inhaler Inhale 2 puffs into the lungs every 6 hours as needed for Wheezing or Shortness of Breath 18 each 0    Insulin Pen Needle 32G X 5 MM MISC 1 each by Does not apply route daily 50 each 3    Dulaglutide (TRULICITY) 4.5 MG/0.5ML SOPN Inject 4.5 mg into the skin once a week 4 pen 2    acarbose (PRECOSE) 25 mg tablet TAKE 1 TABLET BY MOUTH 3 TIMES DAILY WITH MEALS 90 tablet 0    TRESIBA FLEXTOUCH 100 UNIT/ML SOPN INJECT 15 UNITS INTO THE SKIN NIGHTLY (Patient not taking: Reported on 3/1/2023) 15 mL 0    pioglitazone (ACTOS) 45 MG tablet TAKE 1 TABLET BY MOUTH EVERY DAY IN THE MORNING (Patient not taking: Reported on 3/1/2023) 90 tablet 0    prochlorperazine (COMPAZINE) 10 MG tablet Take 1 tablet by mouth every 8 hours as needed (nausea) (Patient not taking: Reported on 3/1/2023) 15 tablet 0    dicyclomine (BENTYL) 20 MG tablet Take 1 tablet by mouth every 8 hours as needed (abdominal cramping) (Patient not taking: Reported on 3/1/2023) 30 tablet 2    meloxicam (MOBIC) 15 MG tablet Take 1 tablet by mouth daily as needed for Pain (arthritis) (Patient not taking: Reported on 3/1/2023) 30 tablet 2    fluvoxaMINE (LUVOX) 25 MG tablet Take 1 tablet by mouth nightly (Patient not taking: Reported on 3/1/2023) 30 tablet 1    fluticasone-umeclidin-vilant (TRELEGY ELLIPTA) 100-62.5-25 MCG/INH AEPB Inhale 1 puff into the lungs daily (Patient not taking: Reported on 3/1/2023) 60 each 2    vitamin D (ERGOCALCIFEROL) 1.25 MG (98125 UT) CAPS capsule Take 1 capsule by mouth once a week (Patient not taking: No sig reported) 4 capsule 0    Nasal Dilators (BREATHE RIGHT EXTRA STRENGTH) STRP 1 strip by Does not apply route nightly as needed (nasal congestion) (Patient not taking: Reported on 3/1/2023) 8 strip 2    fluticasone (FLONASE) 50 MCG/ACT nasal spray 2 sprays by Each Nostril route daily (Patient not taking: Reported on 3/1/2023) 16 g 1    glimepiride (AMARYL) 4 MG tablet Take 1 tablet by mouth 2 times daily (Patient not taking: Reported on 3/1/2023) 180 tablet 0    Dapagliflozin-metFORMIN HCl ER (XIGDUO XR) 5-1000  MG TB24 TAKE 1 TABLET BY MOUTH TWICE A DAY (Patient not taking: Reported on 3/1/2023) 180 tablet 0    potassium chloride (KLOR-CON M) 20 MEQ extended release tablet Take 1 tablet by mouth daily (Patient not taking: Reported on 3/1/2023) 30 tablet 0    diclofenac sodium (VOLTAREN) 1 % GEL Apply 2 g topically every 6 hours as needed for Pain (back pain) (Patient not taking: Reported on 3/1/2023) 150 g 2     No current facility-administered medications for this visit. Return if symptoms worsen or fail to improve.     Lucie Tellez MD, MD

## 2023-03-09 ENCOUNTER — HOSPITAL ENCOUNTER (INPATIENT)
Age: 58
LOS: 1 days | Discharge: HOME HEALTH | End: 2023-03-10
Attending: EMERGENCY MEDICINE | Admitting: INTERNAL MEDICINE
Payer: COMMERCIAL

## 2023-03-09 ENCOUNTER — APPOINTMENT (OUTPATIENT)
Dept: GENERAL RADIOLOGY | Age: 58
End: 2023-03-09
Payer: COMMERCIAL

## 2023-03-09 ENCOUNTER — APPOINTMENT (OUTPATIENT)
Dept: NON INVASIVE DIAGNOSTICS | Age: 58
End: 2023-03-09
Payer: COMMERCIAL

## 2023-03-09 DIAGNOSIS — R07.9 CHEST PAIN, UNSPECIFIED TYPE: ICD-10-CM

## 2023-03-09 DIAGNOSIS — I20.0 UNSTABLE ANGINA (HCC): Primary | ICD-10-CM

## 2023-03-09 DIAGNOSIS — R94.39 ABNORMAL STRESS TEST: ICD-10-CM

## 2023-03-09 PROBLEM — I10 ESSENTIAL HYPERTENSION: Status: ACTIVE | Noted: 2023-03-09

## 2023-03-09 LAB
A/G RATIO: 1.5 (ref 1.1–2.2)
ALBUMIN SERPL-MCNC: 4.6 G/DL (ref 3.4–5)
ALP BLD-CCNC: 101 U/L (ref 40–129)
ALT SERPL-CCNC: 28 U/L (ref 10–40)
ANION GAP SERPL CALCULATED.3IONS-SCNC: 9 MMOL/L (ref 3–16)
APTT: 81.3 SEC (ref 23–34.3)
APTT: >180 SEC (ref 23–34.3)
AST SERPL-CCNC: 21 U/L (ref 15–37)
BASOPHILS ABSOLUTE: 0.1 K/UL (ref 0–0.2)
BASOPHILS RELATIVE PERCENT: 1 %
BILIRUB SERPL-MCNC: 0.7 MG/DL (ref 0–1)
BUN BLDV-MCNC: 18 MG/DL (ref 7–20)
CALCIUM SERPL-MCNC: 9.7 MG/DL (ref 8.3–10.6)
CHLORIDE BLD-SCNC: 104 MMOL/L (ref 99–110)
CO2: 28 MMOL/L (ref 21–32)
CREAT SERPL-MCNC: 1.2 MG/DL (ref 0.9–1.3)
D DIMER: <0.27 UG/ML FEU (ref 0–0.6)
EOSINOPHILS ABSOLUTE: 0.2 K/UL (ref 0–0.6)
EOSINOPHILS RELATIVE PERCENT: 2.2 %
GFR SERPL CREATININE-BSD FRML MDRD: >60 ML/MIN/{1.73_M2}
GLUCOSE BLD-MCNC: 159 MG/DL (ref 70–99)
GLUCOSE BLD-MCNC: 220 MG/DL (ref 70–99)
HCT VFR BLD CALC: 44.2 % (ref 40.5–52.5)
HCT VFR BLD CALC: 48.1 % (ref 40.5–52.5)
HEMOGLOBIN: 14.7 G/DL (ref 13.5–17.5)
HEMOGLOBIN: 16 G/DL (ref 13.5–17.5)
INR BLD: 1.08 (ref 0.87–1.14)
INR BLD: 1.15 (ref 0.87–1.14)
LV EF: 45 %
LVEF MODALITY: NORMAL
LYMPHOCYTES ABSOLUTE: 2.3 K/UL (ref 1–5.1)
LYMPHOCYTES RELATIVE PERCENT: 23.4 %
MCH RBC QN AUTO: 28.7 PG (ref 26–34)
MCH RBC QN AUTO: 28.8 PG (ref 26–34)
MCHC RBC AUTO-ENTMCNC: 33.2 G/DL (ref 31–36)
MCHC RBC AUTO-ENTMCNC: 33.4 G/DL (ref 31–36)
MCV RBC AUTO: 86.3 FL (ref 80–100)
MCV RBC AUTO: 86.4 FL (ref 80–100)
MONOCYTES ABSOLUTE: 0.7 K/UL (ref 0–1.3)
MONOCYTES RELATIVE PERCENT: 7 %
NEUTROPHILS ABSOLUTE: 6.7 K/UL (ref 1.7–7.7)
NEUTROPHILS RELATIVE PERCENT: 66.4 %
PDW BLD-RTO: 13.3 % (ref 12.4–15.4)
PDW BLD-RTO: 13.3 % (ref 12.4–15.4)
PERFORMED ON: ABNORMAL
PLATELET # BLD: 274 K/UL (ref 135–450)
PLATELET # BLD: 302 K/UL (ref 135–450)
PMV BLD AUTO: 8.4 FL (ref 5–10.5)
PMV BLD AUTO: 8.7 FL (ref 5–10.5)
POTASSIUM SERPL-SCNC: 4.5 MMOL/L (ref 3.5–5.1)
PRO-BNP: 28 PG/ML (ref 0–124)
PROTHROMBIN TIME: 13.9 SEC (ref 11.7–14.5)
PROTHROMBIN TIME: 14.6 SEC (ref 11.7–14.5)
RBC # BLD: 5.11 M/UL (ref 4.2–5.9)
RBC # BLD: 5.57 M/UL (ref 4.2–5.9)
SODIUM BLD-SCNC: 141 MMOL/L (ref 136–145)
TOTAL PROTEIN: 7.6 G/DL (ref 6.4–8.2)
TROPONIN: <0.01 NG/ML
TROPONIN: <0.01 NG/ML
WBC # BLD: 10 K/UL (ref 4–11)
WBC # BLD: 10.1 K/UL (ref 4–11)

## 2023-03-09 PROCEDURE — 85027 COMPLETE CBC AUTOMATED: CPT

## 2023-03-09 PROCEDURE — 85610 PROTHROMBIN TIME: CPT

## 2023-03-09 PROCEDURE — 99285 EMERGENCY DEPT VISIT HI MDM: CPT

## 2023-03-09 PROCEDURE — 6370000000 HC RX 637 (ALT 250 FOR IP): Performed by: INTERNAL MEDICINE

## 2023-03-09 PROCEDURE — 94660 CPAP INITIATION&MGMT: CPT

## 2023-03-09 PROCEDURE — 6360000002 HC RX W HCPCS: Performed by: NURSE PRACTITIONER

## 2023-03-09 PROCEDURE — 1200000000 HC SEMI PRIVATE

## 2023-03-09 PROCEDURE — 83036 HEMOGLOBIN GLYCOSYLATED A1C: CPT

## 2023-03-09 PROCEDURE — 93005 ELECTROCARDIOGRAM TRACING: CPT | Performed by: NURSE PRACTITIONER

## 2023-03-09 PROCEDURE — 93351 STRESS TTE COMPLETE: CPT

## 2023-03-09 PROCEDURE — 6370000000 HC RX 637 (ALT 250 FOR IP): Performed by: NURSE PRACTITIONER

## 2023-03-09 PROCEDURE — 36415 COLL VENOUS BLD VENIPUNCTURE: CPT

## 2023-03-09 PROCEDURE — 83880 ASSAY OF NATRIURETIC PEPTIDE: CPT

## 2023-03-09 PROCEDURE — 93320 DOPPLER ECHO COMPLETE: CPT

## 2023-03-09 PROCEDURE — 85379 FIBRIN DEGRADATION QUANT: CPT

## 2023-03-09 PROCEDURE — 85025 COMPLETE CBC W/AUTO DIFF WBC: CPT

## 2023-03-09 PROCEDURE — 80053 COMPREHEN METABOLIC PANEL: CPT

## 2023-03-09 PROCEDURE — 2580000003 HC RX 258: Performed by: INTERNAL MEDICINE

## 2023-03-09 PROCEDURE — 84484 ASSAY OF TROPONIN QUANT: CPT

## 2023-03-09 PROCEDURE — 85730 THROMBOPLASTIN TIME PARTIAL: CPT

## 2023-03-09 PROCEDURE — 94760 N-INVAS EAR/PLS OXIMETRY 1: CPT

## 2023-03-09 RX ORDER — ERGOCALCIFEROL 1.25 MG/1
50000 CAPSULE ORAL WEEKLY
Status: DISCONTINUED | OUTPATIENT
Start: 2023-03-09 | End: 2023-03-09

## 2023-03-09 RX ORDER — POTASSIUM CHLORIDE 20 MEQ/1
20 TABLET, EXTENDED RELEASE ORAL DAILY
Status: DISCONTINUED | OUTPATIENT
Start: 2023-03-10 | End: 2023-03-09

## 2023-03-09 RX ORDER — HEPARIN SODIUM 1000 [USP'U]/ML
4000 INJECTION, SOLUTION INTRAVENOUS; SUBCUTANEOUS ONCE
Status: COMPLETED | OUTPATIENT
Start: 2023-03-09 | End: 2023-03-09

## 2023-03-09 RX ORDER — ASPIRIN 81 MG/1
162 TABLET, CHEWABLE ORAL ONCE
Status: COMPLETED | OUTPATIENT
Start: 2023-03-09 | End: 2023-03-09

## 2023-03-09 RX ORDER — DEXTROSE MONOHYDRATE 100 MG/ML
INJECTION, SOLUTION INTRAVENOUS CONTINUOUS PRN
Status: DISCONTINUED | OUTPATIENT
Start: 2023-03-09 | End: 2023-03-10 | Stop reason: HOSPADM

## 2023-03-09 RX ORDER — ASPIRIN 81 MG/1
81 TABLET, CHEWABLE ORAL DAILY
Status: DISCONTINUED | OUTPATIENT
Start: 2023-03-10 | End: 2023-03-10 | Stop reason: HOSPADM

## 2023-03-09 RX ORDER — ATORVASTATIN CALCIUM 80 MG/1
80 TABLET, FILM COATED ORAL NIGHTLY
Status: DISCONTINUED | OUTPATIENT
Start: 2023-03-09 | End: 2023-03-10 | Stop reason: HOSPADM

## 2023-03-09 RX ORDER — SODIUM CHLORIDE 9 MG/ML
INJECTION, SOLUTION INTRAVENOUS PRN
Status: DISCONTINUED | OUTPATIENT
Start: 2023-03-09 | End: 2023-03-10 | Stop reason: HOSPADM

## 2023-03-09 RX ORDER — SODIUM CHLORIDE 0.9 % (FLUSH) 0.9 %
5-40 SYRINGE (ML) INJECTION EVERY 12 HOURS SCHEDULED
Status: DISCONTINUED | OUTPATIENT
Start: 2023-03-09 | End: 2023-03-10 | Stop reason: HOSPADM

## 2023-03-09 RX ORDER — HEPARIN SODIUM 1000 [USP'U]/ML
4000 INJECTION, SOLUTION INTRAVENOUS; SUBCUTANEOUS ONCE
Status: DISCONTINUED | OUTPATIENT
Start: 2023-03-09 | End: 2023-03-09 | Stop reason: SDUPTHER

## 2023-03-09 RX ORDER — POTASSIUM CHLORIDE 7.45 MG/ML
10 INJECTION INTRAVENOUS PRN
Status: DISCONTINUED | OUTPATIENT
Start: 2023-03-09 | End: 2023-03-10 | Stop reason: HOSPADM

## 2023-03-09 RX ORDER — ONDANSETRON 4 MG/1
4 TABLET, ORALLY DISINTEGRATING ORAL EVERY 8 HOURS PRN
Status: DISCONTINUED | OUTPATIENT
Start: 2023-03-09 | End: 2023-03-10 | Stop reason: HOSPADM

## 2023-03-09 RX ORDER — ALBUTEROL SULFATE 90 UG/1
2 AEROSOL, METERED RESPIRATORY (INHALATION) EVERY 6 HOURS PRN
Status: DISCONTINUED | OUTPATIENT
Start: 2023-03-09 | End: 2023-03-09

## 2023-03-09 RX ORDER — HEPARIN SODIUM 1000 [USP'U]/ML
2000 INJECTION, SOLUTION INTRAVENOUS; SUBCUTANEOUS PRN
Status: DISCONTINUED | OUTPATIENT
Start: 2023-03-09 | End: 2023-03-10 | Stop reason: HOSPADM

## 2023-03-09 RX ORDER — HEPARIN SODIUM 10000 [USP'U]/100ML
5-30 INJECTION, SOLUTION INTRAVENOUS CONTINUOUS
Status: DISCONTINUED | OUTPATIENT
Start: 2023-03-09 | End: 2023-03-10 | Stop reason: HOSPADM

## 2023-03-09 RX ORDER — NITROGLYCERIN 0.4 MG/1
0.4 TABLET SUBLINGUAL EVERY 5 MIN PRN
Status: DISCONTINUED | OUTPATIENT
Start: 2023-03-09 | End: 2023-03-10 | Stop reason: HOSPADM

## 2023-03-09 RX ORDER — DICYCLOMINE HCL 20 MG
20 TABLET ORAL EVERY 8 HOURS PRN
Status: DISCONTINUED | OUTPATIENT
Start: 2023-03-09 | End: 2023-03-09

## 2023-03-09 RX ORDER — 0.9 % SODIUM CHLORIDE 0.9 %
500 INTRAVENOUS SOLUTION INTRAVENOUS PRN
Status: DISCONTINUED | OUTPATIENT
Start: 2023-03-09 | End: 2023-03-10 | Stop reason: HOSPADM

## 2023-03-09 RX ORDER — HEPARIN SODIUM 1000 [USP'U]/ML
4000 INJECTION, SOLUTION INTRAVENOUS; SUBCUTANEOUS PRN
Status: DISCONTINUED | OUTPATIENT
Start: 2023-03-09 | End: 2023-03-10 | Stop reason: HOSPADM

## 2023-03-09 RX ORDER — INSULIN LISPRO 100 [IU]/ML
0-4 INJECTION, SOLUTION INTRAVENOUS; SUBCUTANEOUS NIGHTLY
Status: DISCONTINUED | OUTPATIENT
Start: 2023-03-09 | End: 2023-03-10 | Stop reason: HOSPADM

## 2023-03-09 RX ORDER — SODIUM CHLORIDE 0.9 % (FLUSH) 0.9 %
10 SYRINGE (ML) INJECTION PRN
Status: DISCONTINUED | OUTPATIENT
Start: 2023-03-09 | End: 2023-03-10 | Stop reason: HOSPADM

## 2023-03-09 RX ORDER — INSULIN LISPRO 100 [IU]/ML
0-8 INJECTION, SOLUTION INTRAVENOUS; SUBCUTANEOUS
Status: DISCONTINUED | OUTPATIENT
Start: 2023-03-10 | End: 2023-03-10 | Stop reason: HOSPADM

## 2023-03-09 RX ORDER — FLUTICASONE PROPIONATE 50 MCG
2 SPRAY, SUSPENSION (ML) NASAL DAILY
Status: DISCONTINUED | OUTPATIENT
Start: 2023-03-10 | End: 2023-03-09

## 2023-03-09 RX ORDER — PROCHLORPERAZINE MALEATE 5 MG/1
10 TABLET ORAL EVERY 8 HOURS PRN
Status: DISCONTINUED | OUTPATIENT
Start: 2023-03-09 | End: 2023-03-09

## 2023-03-09 RX ORDER — PIOGLITAZONEHYDROCHLORIDE 15 MG/1
15 TABLET ORAL DAILY
Status: DISCONTINUED | OUTPATIENT
Start: 2023-03-10 | End: 2023-03-09

## 2023-03-09 RX ORDER — FLUVOXAMINE MALEATE 50 MG/1
25 TABLET, COATED ORAL NIGHTLY
Status: DISCONTINUED | OUTPATIENT
Start: 2023-03-09 | End: 2023-03-09

## 2023-03-09 RX ORDER — HYDRALAZINE HYDROCHLORIDE 20 MG/ML
10 INJECTION INTRAMUSCULAR; INTRAVENOUS EVERY 6 HOURS PRN
Status: DISCONTINUED | OUTPATIENT
Start: 2023-03-09 | End: 2023-03-10 | Stop reason: HOSPADM

## 2023-03-09 RX ORDER — HEPARIN SODIUM 10000 [USP'U]/100ML
5-30 INJECTION, SOLUTION INTRAVENOUS CONTINUOUS
Status: DISCONTINUED | OUTPATIENT
Start: 2023-03-09 | End: 2023-03-09 | Stop reason: SDUPTHER

## 2023-03-09 RX ORDER — HEPARIN SODIUM 1000 [USP'U]/ML
2000 INJECTION, SOLUTION INTRAVENOUS; SUBCUTANEOUS PRN
Status: DISCONTINUED | OUTPATIENT
Start: 2023-03-09 | End: 2023-03-09 | Stop reason: SDUPTHER

## 2023-03-09 RX ORDER — ACETAMINOPHEN 650 MG/1
650 SUPPOSITORY RECTAL EVERY 6 HOURS PRN
Status: DISCONTINUED | OUTPATIENT
Start: 2023-03-09 | End: 2023-03-10 | Stop reason: HOSPADM

## 2023-03-09 RX ORDER — INSULIN ASPART 100 [IU]/ML
15 INJECTION, SOLUTION INTRAVENOUS; SUBCUTANEOUS
COMMUNITY

## 2023-03-09 RX ORDER — MELOXICAM 7.5 MG/1
15 TABLET ORAL DAILY PRN
Status: DISCONTINUED | OUTPATIENT
Start: 2023-03-09 | End: 2023-03-09

## 2023-03-09 RX ORDER — HEPARIN SODIUM 1000 [USP'U]/ML
4000 INJECTION, SOLUTION INTRAVENOUS; SUBCUTANEOUS PRN
Status: DISCONTINUED | OUTPATIENT
Start: 2023-03-09 | End: 2023-03-09 | Stop reason: SDUPTHER

## 2023-03-09 RX ORDER — ONDANSETRON 2 MG/ML
4 INJECTION INTRAMUSCULAR; INTRAVENOUS EVERY 6 HOURS PRN
Status: DISCONTINUED | OUTPATIENT
Start: 2023-03-09 | End: 2023-03-10 | Stop reason: HOSPADM

## 2023-03-09 RX ORDER — ACETAMINOPHEN 325 MG/1
650 TABLET ORAL EVERY 6 HOURS PRN
Status: DISCONTINUED | OUTPATIENT
Start: 2023-03-09 | End: 2023-03-10 | Stop reason: HOSPADM

## 2023-03-09 RX ORDER — POTASSIUM CHLORIDE 20 MEQ/1
40 TABLET, EXTENDED RELEASE ORAL PRN
Status: DISCONTINUED | OUTPATIENT
Start: 2023-03-09 | End: 2023-03-10 | Stop reason: HOSPADM

## 2023-03-09 RX ORDER — DOXYCYCLINE HYCLATE 100 MG/1
100 CAPSULE ORAL EVERY 12 HOURS SCHEDULED
Status: DISCONTINUED | OUTPATIENT
Start: 2023-03-09 | End: 2023-03-09

## 2023-03-09 RX ORDER — MORPHINE SULFATE 2 MG/ML
2 INJECTION, SOLUTION INTRAMUSCULAR; INTRAVENOUS EVERY 4 HOURS PRN
Status: DISCONTINUED | OUTPATIENT
Start: 2023-03-09 | End: 2023-03-10 | Stop reason: HOSPADM

## 2023-03-09 RX ADMIN — SODIUM CHLORIDE, PRESERVATIVE FREE 10 ML: 5 INJECTION INTRAVENOUS at 23:02

## 2023-03-09 RX ADMIN — HEPARIN SODIUM 4000 UNITS: 1000 INJECTION INTRAVENOUS; SUBCUTANEOUS at 20:28

## 2023-03-09 RX ADMIN — ASPIRIN 162 MG: 81 TABLET, CHEWABLE ORAL at 18:19

## 2023-03-09 RX ADMIN — HEPARIN SODIUM 10 UNITS/KG/HR: 10000 INJECTION, SOLUTION INTRAVENOUS at 20:29

## 2023-03-09 RX ADMIN — ATORVASTATIN CALCIUM 80 MG: 80 TABLET, FILM COATED ORAL at 23:00

## 2023-03-09 RX ADMIN — SODIUM CHLORIDE, PRESERVATIVE FREE 10 ML: 5 INJECTION INTRAVENOUS at 23:00

## 2023-03-09 RX ADMIN — NITROGLYCERIN 0.5 INCH: 20 OINTMENT TOPICAL at 20:44

## 2023-03-09 ASSESSMENT — ENCOUNTER SYMPTOMS
ABDOMINAL PAIN: 0
NAUSEA: 0
DIARRHEA: 0
CHEST TIGHTNESS: 0
VOMITING: 0
SHORTNESS OF BREATH: 1

## 2023-03-09 ASSESSMENT — HEART SCORE: ECG: 0

## 2023-03-09 ASSESSMENT — LIFESTYLE VARIABLES: HOW OFTEN DO YOU HAVE A DRINK CONTAINING ALCOHOL: NEVER

## 2023-03-09 NOTE — LETTER
March 10, 2023       Prakash Ibrahim YOB: 1965   [address] [facility] Date of Visit:  3/9/2023       To Whom It May Concern:    Nael Coronado has been under our care here at [facility] from 03/09/2023 to 03/10/2023. It is my medical opinion that Nael Coronado may return to work on 03/13/2023. If you have any questions or concerns, please don't hesitate to call.     Sincerely,        Amarjit Maher RN  Cc Dr Sondra Burnham

## 2023-03-09 NOTE — ED PROVIDER NOTES
[facility] Emergency Department      Pt Name: Dania Perkins  MRN: [id number]  [place] 1965  Date of evaluation: 3/9/2023  Provider: Branden Cosme MD  I independently performed a history and physical on Dania Perkins. All diagnostic, treatment, and disposition decisions were made by myself in conjunction with the advanced practice provider. HPI: Dania Perkins presented with   Chief Complaint   Patient presents with    Abnormal Test Results     Pt from cardiology, states he just had a stress test and it came back abnormal, concerned for blockage      Dania Perkins has a past medical history of Depression, Gout, Hyperlipidemia, Type II or unspecified type diabetes mellitus without mention of complication, not stated as uncontrolled, and Unspecified sleep apnea. He has a past surgical history that includes [place] tooth extraction (1986). No current facility-administered medications on file prior to encounter.      Current Outpatient Medications on File Prior to Encounter   Medication Sig Dispense Refill    nystatin (MYCOSTATIN) POWD powder Apply 1 each topically daily      doxycycline hyclate (VIBRAMYCIN) 100 MG capsule TAKE ONE TAB BY MOUTH TWICE A DAY WITH FULL MEAL AND WATER FOR 10 DAYS      CLINDAMYCIN PHOSPHATE,TOPICAL, 1 % SWAB USE TO WIPE AFFECTED AREA ON BACK AND CHEST TWICE A DAY      CEQUR SIMPLICITY 2U ANIVAL       Continuous Blood Gluc Sensor (DEXCOM G6 SENSOR) MISC by Other route      Continuous Blood Gluc Transmit (DEXCOM G6 TRANSMITTER) MISC by Other route      acarbose (PRECOSE) 25 mg tablet TAKE 1 TABLET BY MOUTH 3 TIMES DAILY WITH MEALS 90 tablet 0    TRESIBA FLEXTOUCH 100 UNIT/ML SOPN INJECT 15 UNITS INTO THE SKIN NIGHTLY (Patient not taking: Reported on 3/1/2023) 15 mL 0    pioglitazone (ACTOS) 45 MG tablet TAKE 1 TABLET BY MOUTH EVERY DAY IN THE MORNING (Patient not taking: Reported on 3/1/2023) 90 tablet 0    Continuous Blood Gluc Transmit (DEXCOM G6 TRANSMITTER) MISC CHANGE EVERY 3 MONTHS. 1 each 3    Continuous Blood Gluc Sensor (DEXCOM G6 SENSOR) MISC Change every 10 days.  1 each 11    prochlorperazine (COMPAZINE) 10 MG tablet Take 1 tablet by mouth every 8 hours as needed (nausea) (Patient not taking: Reported on 3/1/2023) 15 tablet 0    dicyclomine (BENTYL) 20 MG tablet Take 1 tablet by mouth every 8 hours as needed (abdominal cramping) (Patient not taking: Reported on 3/1/2023) 30 tablet 2    albuterol sulfate  (90 Base) MCG/ACT inhaler Inhale 2 puffs into the lungs every 6 hours as needed for Wheezing or Shortness of Breath 18 each 0    meloxicam (MOBIC) 15 MG tablet Take 1 tablet by mouth daily as needed for Pain (arthritis) (Patient not taking: Reported on 3/1/2023) 30 tablet 2    fluvoxaMINE (LUVOX) 25 MG tablet Take 1 tablet by mouth nightly (Patient not taking: Reported on 3/1/2023) 30 tablet 1    Insulin Pen Needle 32G X 5 MM MISC 1 each by Does not apply route daily 50 each 3    fluticasone-umeclidin-vilant (TRELEGY ELLIPTA) 100-62.5-25 MCG/INH AEPB Inhale 1 puff into the lungs daily (Patient not taking: Reported on 3/1/2023) 60 each 2    vitamin D (ERGOCALCIFEROL) 1.25 MG (02443 UT) CAPS capsule Take 1 capsule by mouth once a week (Patient not taking: No sig reported) 4 capsule 0    Nasal Dilators (BREATHE RIGHT EXTRA STRENGTH) STRP 1 strip by Does not apply route nightly as needed (nasal congestion) (Patient not taking: Reported on 3/1/2023) 8 strip 2    fluticasone (FLONASE) 50 MCG/ACT nasal spray 2 sprays by Each Nostril route daily (Patient not taking: Reported on 3/1/2023) 16 g 1    Dulaglutide (TRULICITY) 4.5 QN/2.9VS SOPN Inject 4.5 mg into the skin once a week 4 pen 2    glimepiride (AMARYL) 4 MG tablet Take 1 tablet by mouth 2 times daily (Patient not taking: Reported on 3/1/2023) 180 tablet 0    Dapagliflozin-metFORMIN HCl ER (XIGDUO XR) 5-1000 MG TB24 TAKE 1 TABLET BY MOUTH TWICE A DAY (Patient not taking: Reported on 3/1/2023) 180 tablet 0    potassium chloride (KLOR-CON OBED) 20 MEQ extended release tablet Take 1 tablet by mouth daily (Patient not taking: Reported on 3/1/2023) 30 tablet 0    diclofenac sodium (VOLTAREN) 1 % GEL Apply 2 g topically every 6 hours as needed for Pain (back pain) (Patient not taking: Reported on 3/1/2023) 150 g 2     PHYSICAL EXAM  Vitals: /78   Pulse (!) 107   Temp 97.9 °F (36.6 °C)   Resp 19   Wt 216 lb (98 kg)   SpO2 100%   BMI 29.29 kg/m²   Constitutional:  56 y.o. male alert, cooperative  HENT:  Atraumatic scalp, mucous membranes moist  Eyes:   Conjunctiva clear, no icterus  Neck:  Supple, no visible JVD, no signs of injury  Cardiovascular:  Regular, no rubs  Thorax & Lungs:  No accessory muscle usage, clear  Abdomen:  Soft, non distended, NT  Back:  No deformity  Genitalia:  Deferred  Rectal:  Deferred  Extremities:  No cyanosis, no edema, adequate perfusion  Skin:  Exposed areas warm, dry  Neurologic:  Alert, no slurred speech  Psychiatric:  Affect appropriate    MEDICAL DECISION MAKING:  Briefly, this is an 56 y. o.male who presented with concern for chest pain. Has had several weeks of symptoms. Today had an abnormal stress test.  He has a heart score of 4. Cardiology consulted. He will be admitted for further care. For further details of [facility] encounter, please see documentation by advanced practice provider Manisha Gillis CNP.      Labs Reviewed   COMPREHENSIVE METABOLIC PANEL - Abnormal; Notable for the following components:       Result Value    Glucose 159 (*)     All other components within normal limits   PROTIME-INR - Abnormal; Notable for the following components:    Protime 14.6 (*)     INR 1.15 (*)     All other components within normal limits   CBC WITH AUTO DIFFERENTIAL   TROPONIN   BRAIN NATRIURETIC PEPTIDE   APTT   ANTI-XA, UNFRACTIONATED HEPARIN   ANTI-XA, UNFRACTIONATED HEPARIN     EKG:  Read by me in the absence of a cardiologist shows: Sinus rhythm, rate 82, intervals normal, axis -34, no acute injury pattern, no major change from prior study from earlier this month    CRITICAL CARE:   Total critical care time of 15 non concurrent  minutes (excludes any time for procedures). This includes but not limited to vital sign monitoring, medication and/or clinical response to medications, interpretation of diagnostics, review of nursing notes, pertinent record review, discussions about patient condition, consultation time, documentation time. Critical care due to the patient's [place]. Vitals:    03/09/23 1700 03/09/23 2000 03/09/23 2005   BP: 121/78 123/88    Pulse: (!) 107 88 87   Resp: 19 13 16   Temp: 97.9 °F (36.6 °C)     SpO2: 100% 100% 99%   Weight: 216 lb (98 kg)       History from : Patient  Limitations to history : None  Chronic Conditions:  has a past medical history of Depression, Gout, Hyperlipidemia, Type II or unspecified type diabetes mellitus without mention of complication, not stated as uncontrolled, and Unspecified sleep apnea. Records Reviewed :  Outpatient Notes PCP visit  Disposition Considerations (Tests not ordered but considered, Shared Decision Making, Pt Expectation of Test or Tx.):  MRI not deemed clinically necessary in the ED setting  Discussion with Other Profesionals : Admitting Team    IP CONSULT TO CARDIOLOGY  IP CONSULT TO HOSPITALIST  IP CONSULT TO INTERNAL MEDICINE  IP CONSULT TO CARDIOLOGY    Medications administered:  Medications   heparin (porcine) injection 4,000 Units (has no administration in time range)   heparin (porcine) injection 2,000 Units (has no administration in time range)   heparin 25,000 units in dextrose 5% 250 mL (premix) infusion (10 Units/kg/hr × 98 kg IntraVENous New Bag 3/9/23 2029)   nitroglycerin (NITRO-BID) 2 % ointment 0.5 inch (0.5 inches Topical Given 3/9/23 2044)   aspirin chewable tablet 162 mg (162 mg Oral Given 3/9/23 1819)   heparin (porcine) injection 4,000 Units (4,000 Units IntraVENous Given 3/9/23 2028)     FINAL IMPRESSION:    1. Unstable angina ([facility] [address]    2.  Abnormal stress test      DISPOSITION Admitted 03/09/2023 08:08:46 PM       Gila Martinez MD  03/09/23 1594

## 2023-03-09 NOTE — ED PROVIDER NOTES
[facility]        Pt Name: Jeanetta Hammans  MRN: [id number]  [place] 1965  Date of evaluation: 3/9/2023  Provider: CLARY Barragan - CNP  PCP: Laura Art MD  Note Started: 5:50 PM EST 3/9/23       I have seen and evaluated this patient with my supervising physician Laila Torres, *. CHIEF COMPLAINT       Chief Complaint   Patient presents with    Abnormal Test Results     Pt from cardiology, states he just had a stress test and it came back abnormal, concerned for blockage        HISTORY OF PRESENT ILLNESS: 1 or more Elements     History from : Patient    Limitations to history : None    Jeanetta Hammans is a 56 y.o. male who presents to the ER from stress lab with abnormal stress test for admission. Reports elephant sitting on chest sensation that has been constant for 2 months. Reports some exertional sob. No mitigating or exacerbating factors. Abnormal stress test today, sent for unstable angina and admission. Hx of diabetes, wears a sensor. Never a smoker. No HTN, no high cholesterol. + family hx of heart disease, father had cardiac bypass in his 63's. Mom is living and in her [de-identified] and just had a pacemaker. Denies any headache, fever, lightheadedness, dizziness, visual disturbances. No neck or back pain. No shortness of breath, cough, or congestion. No abdominal pain, nausea, vomiting, diarrhea, constipation, or dysuria. No rash. Nursing Notes were all reviewed and agreed with or any disagreements were addressed in the HPI. REVIEW OF SYSTEMS :      Review of Systems   Constitutional:  Negative for activity change, chills and fever. Respiratory:  Positive for shortness of breath. Negative for chest tightness. Cardiovascular:  Positive for chest pain. Gastrointestinal:  Negative for abdominal pain, diarrhea, nausea and vomiting. Genitourinary:  Negative for dysuria.    All other systems reviewed and are negative. Positives and Pertinent negatives as per HPI. SURGICAL HISTORY     Past Surgical History:   Procedure Laterality Date    WISDOM TOOTH EXTRACTION  1986       CURRENTMEDICATIONS       Previous Medications    ACARBOSE (PRECOSE) 25 MG TABLET    TAKE 1 TABLET BY MOUTH 3 TIMES DAILY WITH MEALS    ALBUTEROL SULFATE  (90 BASE) MCG/ACT INHALER    Inhale 2 puffs into the lungs every 6 hours as needed for Wheezing or Shortness of Breath    CEQUR SIMPLICITY 2U ANIVAL        CLINDAMYCIN PHOSPHATE,TOPICAL, 1 % SWAB    USE TO WIPE AFFECTED AREA ON BACK AND CHEST TWICE A DAY    CONTINUOUS BLOOD GLUC SENSOR (DEXCOM G6 SENSOR) MISC    Change every 10 days. CONTINUOUS BLOOD GLUC SENSOR (DEXCOM G6 SENSOR) MISC    by Other route    CONTINUOUS BLOOD GLUC TRANSMIT (DEXCOM G6 TRANSMITTER) MISC    CHANGE EVERY 3 MONTHS.     CONTINUOUS BLOOD GLUC TRANSMIT (DEXCOM G6 TRANSMITTER) MISC    by Other route    DAPAGLIFLOZIN-METFORMIN HCL ER (XIGDUO XR) 5-1000 MG TB24    TAKE 1 TABLET BY MOUTH TWICE A DAY    DICLOFENAC SODIUM (VOLTAREN) 1 % GEL    Apply 2 g topically every 6 hours as needed for Pain (back pain)    DICYCLOMINE (BENTYL) 20 MG TABLET    Take 1 tablet by mouth every 8 hours as needed (abdominal cramping)    DOXYCYCLINE HYCLATE (VIBRAMYCIN) 100 MG CAPSULE    TAKE ONE TAB BY MOUTH TWICE A DAY WITH FULL MEAL AND WATER FOR 10 DAYS    DULAGLUTIDE (TRULICITY) 4.5 NR/7.3AH SOPN    Inject 4.5 mg into the skin once a week    FLUTICASONE (FLONASE) 50 MCG/ACT NASAL SPRAY    2 sprays by Each Nostril route daily    FLUTICASONE-UMECLIDIN-VILANT (TRELEGY ELLIPTA) 100-62.5-25 MCG/INH AEPB    Inhale 1 puff into the lungs daily    FLUVOXAMINE (LUVOX) 25 MG TABLET    Take 1 tablet by mouth nightly    GLIMEPIRIDE (AMARYL) 4 MG TABLET    Take 1 tablet by mouth 2 times daily    INSULIN PEN NEEDLE 32G X 5 MM MISC    1 each by Does not apply route daily    MELOXICAM (MOBIC) 15 MG TABLET    Take 1 tablet by mouth daily as needed for Pain (arthritis)    NASAL DILATORS (BREATHE RIGHT EXTRA STRENGTH) STRP    1 strip by Does not apply route nightly as needed (nasal congestion)    NYSTATIN (MYCOSTATIN) POWD POWDER    Apply 1 each topically daily    PIOGLITAZONE (ACTOS) 45 MG TABLET    TAKE 1 TABLET BY MOUTH EVERY DAY IN THE MORNING    POTASSIUM CHLORIDE (KLOR-CON M) 20 MEQ EXTENDED RELEASE TABLET    Take 1 tablet by mouth daily    PROCHLORPERAZINE (COMPAZINE) 10 MG TABLET    Take 1 tablet by mouth every 8 hours as needed (nausea)    TRESIBA FLEXTOUCH 100 UNIT/ML SOPN    INJECT 15 UNITS INTO THE SKIN NIGHTLY    VITAMIN D (ERGOCALCIFEROL) 1.25 MG (16717 UT) CAPS CAPSULE    Take 1 capsule by mouth once a week       ALLERGIES     Indomethacin    FAMILYHISTORY       Family History   Problem Relation Age of Onset    Cancer Father 39        colon    Diabetes Father     Alzheimer's Disease Father     Diabetes Mother     Diabetes Maternal Aunt     Cancer Sister         breast        SOCIAL HISTORY       Social History     Tobacco Use    Smoking status: Never    Smokeless tobacco: Never   Substance Use Topics    Alcohol use: No     Alcohol/week: 0.0 standard drinks    Drug use: No       SCREENINGS        [place] Coma Scale  Eye Opening: Spontaneous  Best Verbal Response: Oriented  Best Motor Response: Obeys commands  [place] Coma Scale Score: 15        Heart Score for chest pain patients  History:  Moderately Suspicious  ECG: Normal  Patient Age: > 39 and < 65 years  *Risk factors for Atherosclerotic disease: Diabetes Mellitus, Obesity, Positive family History  Risk Factors: > 3 Risk factors or history of atherosclerotic disease*  Troponin: < 1X normal limit  Heart Score Total: 4       CIWA Assessment  BP: 121/78  Heart Rate: (!) 107           PHYSICAL EXAM  1 or more Elements     ED Triage Vitals [03/09/23 1700]   BP Temp Temp src Heart Rate Resp SpO2 Height Weight   121/78 97.9 °F (36.6 °C) -- (!) 107 19 100 % -- 216 lb (98 kg) Physical Exam  Vitals and nursing note reviewed. Constitutional:       Appearance: He is well-developed. He is not diaphoretic. HENT:      Head: Normocephalic and atraumatic. Right Ear: External ear normal.      Left Ear: External ear normal.   Eyes:      General:         Right eye: No discharge. Left eye: No discharge. Neck:      Vascular: No JVD. Cardiovascular:      Rate and Rhythm: Normal rate and regular rhythm. Pulses: Normal pulses. Heart sounds: Normal heart sounds. Pulmonary:      Effort: Pulmonary effort is normal. No respiratory distress. Breath sounds: Normal breath sounds. Abdominal:      Palpations: Abdomen is soft. Musculoskeletal:         General: Normal range of motion. Skin:     General: Skin is warm and dry. Coloration: Skin is not pale. Neurological:      Mental Status: He is alert. Psychiatric:         Behavior: Behavior normal.           DIAGNOSTIC RESULTS   LABS:    Labs Reviewed   COMPREHENSIVE METABOLIC PANEL - Abnormal; Notable for the following components:       Result Value    Glucose 159 (*)     All other components within normal limits   CBC WITH AUTO DIFFERENTIAL   TROPONIN   BRAIN NATRIURETIC PEPTIDE   APTT   PROTIME-INR   ANTI-XA, UNFRACTIONATED HEPARIN   ANTI-XA, UNFRACTIONATED HEPARIN       When ordered only abnormal lab results are displayed. All other labs were within normal range or not returned as of this dictation. EKG: When ordered, EKG's are interpreted by the Emergency Department Physician in the absence of a cardiologist.  Please see their note for interpretation of EKG.     RADIOLOGY:   Non-plain film images such as CT, Ultrasound and MRI are read by the radiologist. Plain radiographic images are visualized and preliminarily interpreted by the ED Provider with the below findings:        Interpretation per the Radiologist below, if available at the time of this note:    XR CHEST PORTABLE    (Results Pending) No results found. No results found. PROCEDURES   Unless otherwise noted below, none     Procedures    CRITICAL CARE TIME (.cctime)   There was a high probability of life-threatening clinical deterioration in the patient's condition requiring my urgent intervention. I personally saw the patient and independently provided 41 minutes of non-concurrent critical care out of the total shared critical care time provided, excluding separately reportable procedures. PAST MEDICAL HISTORY      has a past medical history of Depression, Gout, Hyperlipidemia, Type II or unspecified type diabetes mellitus without mention of complication, not stated as uncontrolled, and Unspecified sleep apnea. Chronic Conditions affecting Care: diabetes, hx of family heart disease    EMERGENCY DEPARTMENT COURSE and DIFFERENTIAL DIAGNOSIS/MDM:   Vitals:    Vitals:    03/09/23 1700   BP: 121/78   Pulse: (!) 107   Resp: 19   Temp: 97.9 °F (36.6 °C)   SpO2: 100%   Weight: 216 lb (98 kg)       Patient was given the following medications:  Medications   heparin (porcine) injection 4,000 Units (has no administration in time range)   heparin (porcine) injection 4,000 Units (has no administration in time range)   heparin (porcine) injection 2,000 Units (has no administration in time range)   heparin 25,000 units in dextrose 5% 250 mL (premix) infusion (has no administration in time range)   nitroglycerin (NITRO-BID) 2 % ointment 0.5 inch (has no administration in time range)   aspirin chewable tablet 162 mg (162 mg Oral Given 3/9/23 1819)             Is this patient to be included in the SEP-1 Core Measure due to severe sepsis or septic shock? No   Exclusion criteria - the patient is NOT to be included for SEP-1 Core Measure due to:   Infection is not suspected    CONSULTS: (Who and What was discussed)  IP CONSULT TO CARDIOLOGY  IP CONSULT TO HOSPITALIST  IP CONSULT TO INTERNAL MEDICINE  Discussion with Other Profesionals : Admitting Team Dr. Kaz Arambula and Consultant Dr. Uday Willett    Social Determinants : None    Records Reviewed : Outpatient Labs & Studies stress test today    CC/HPI Summary, DDx, ED Course, and Reassessment:     Briefly, this is a 56year old gentleman that was sent over by DR. Lozada for admission for abnormal stress test and unstable angina. Reports that he has had pain for 2 months, stress test today was abnormal.  Some sob.    2 baby aspirin prior arrival.    Stress test today  Summary   Abnormal stress ECHO suggestive of anteroapical scar and/or ischemia   ischemia. Recommendation   Due to new onset symptoms c/w unstable angina and abnormal stress echo he   will go to ER for further evaluation of unstable angina    I did speak with Dr. Uriel Deras him, cardiology, we did review the patient's presentation in the emergency department as well as labs and previous medical history. Heparin will be ordered for unstable angina. I also ordered 2 baby aspirin and Nitropaste. N.p.o. at midnight, plan for cardiac catheterization tomorrow by cardiology. Patient is aware and agreeable regarding plan of care. I did admit to Dr. Ben Woodard. Disposition Considerations (include 1 Tests not done, Shared Decision Making, Pt Expectation of Test or Tx.): shared decision making used throughout    I did consider dimer    admit      I am the Primary Clinician of Record. FINAL IMPRESSION      1. Unstable angina ([address]    2. Abnormal stress test          DISPOSITION/PLAN     DISPOSITION Decision To Admit 03/09/2023 07:42:04 PM      PATIENT REFERRED TO:  No follow-up provider specified.     DISCHARGE MEDICATIONS:  New Prescriptions    No medications on file       DISCONTINUED MEDICATIONS:  Discontinued Medications    No medications on file              (Please note that portions of this note were completed with a voice recognition program.  Efforts were made to edit the dictations but occasionally words are mis-transcribed.)    Silverio Hodges Corina Ortiz - CNP (electronically signed)           CLARY Solitario - CNP  03/09/23 1959

## 2023-03-10 VITALS
BODY MASS INDEX: 29.05 KG/M2 | HEIGHT: 72 IN | TEMPERATURE: 98.6 F | OXYGEN SATURATION: 99 % | WEIGHT: 214.51 LBS | DIASTOLIC BLOOD PRESSURE: 76 MMHG | SYSTOLIC BLOOD PRESSURE: 116 MMHG | HEART RATE: 89 BPM | RESPIRATION RATE: 16 BRPM

## 2023-03-10 LAB
A/G RATIO: 1.3 (ref 1.1–2.2)
ALBUMIN SERPL-MCNC: 3.8 G/DL (ref 3.4–5)
ALP BLD-CCNC: 90 U/L (ref 40–129)
ALT SERPL-CCNC: 27 U/L (ref 10–40)
ANION GAP SERPL CALCULATED.3IONS-SCNC: 6 MMOL/L (ref 3–16)
ANTI-XA UNFRAC HEPARIN: 0.17 IU/ML (ref 0.3–0.7)
ANTI-XA UNFRAC HEPARIN: 0.34 IU/ML (ref 0.3–0.7)
AST SERPL-CCNC: 19 U/L (ref 15–37)
BASOPHILS ABSOLUTE: 0.1 K/UL (ref 0–0.2)
BASOPHILS RELATIVE PERCENT: 0.9 %
BILIRUB SERPL-MCNC: 0.4 MG/DL (ref 0–1)
BUN BLDV-MCNC: 21 MG/DL (ref 7–20)
CALCIUM SERPL-MCNC: 9.3 MG/DL (ref 8.3–10.6)
CHLORIDE BLD-SCNC: 104 MMOL/L (ref 99–110)
CO2: 28 MMOL/L (ref 21–32)
CREAT SERPL-MCNC: 1.2 MG/DL (ref 0.9–1.3)
EKG ATRIAL RATE: 82 BPM
EKG ATRIAL RATE: 89 BPM
EKG DIAGNOSIS: NORMAL
EKG DIAGNOSIS: NORMAL
EKG P AXIS: 38 DEGREES
EKG P AXIS: 46 DEGREES
EKG P-R INTERVAL: 162 MS
EKG P-R INTERVAL: 166 MS
EKG Q-T INTERVAL: 366 MS
EKG Q-T INTERVAL: 374 MS
EKG QRS DURATION: 86 MS
EKG QRS DURATION: 98 MS
EKG QTC CALCULATION (BAZETT): 427 MS
EKG QTC CALCULATION (BAZETT): 455 MS
EKG R AXIS: -34 DEGREES
EKG R AXIS: -77 DEGREES
EKG T AXIS: 39 DEGREES
EKG T AXIS: 43 DEGREES
EKG VENTRICULAR RATE: 82 BPM
EKG VENTRICULAR RATE: 89 BPM
EOSINOPHILS ABSOLUTE: 0.4 K/UL (ref 0–0.6)
EOSINOPHILS RELATIVE PERCENT: 4 %
ESTIMATED AVERAGE GLUCOSE: 231.7 MG/DL
ESTIMATED AVERAGE GLUCOSE: 231.7 MG/DL
GFR SERPL CREATININE-BSD FRML MDRD: >60 ML/MIN/{1.73_M2}
GLUCOSE BLD-MCNC: 113 MG/DL (ref 70–99)
GLUCOSE BLD-MCNC: 146 MG/DL (ref 70–99)
GLUCOSE BLD-MCNC: 150 MG/DL (ref 70–99)
GLUCOSE BLD-MCNC: 155 MG/DL (ref 70–99)
HBA1C MFR BLD: 9.7 %
HBA1C MFR BLD: 9.7 %
HCT VFR BLD CALC: 45.9 % (ref 40.5–52.5)
HEMOGLOBIN: 15 G/DL (ref 13.5–17.5)
LEFT VENTRICULAR EJECTION FRACTION MODE: NORMAL
LYMPHOCYTES ABSOLUTE: 3.3 K/UL (ref 1–5.1)
LYMPHOCYTES RELATIVE PERCENT: 36.8 %
MCH RBC QN AUTO: 28.2 PG (ref 26–34)
MCHC RBC AUTO-ENTMCNC: 32.6 G/DL (ref 31–36)
MCV RBC AUTO: 86.4 FL (ref 80–100)
MONOCYTES ABSOLUTE: 0.8 K/UL (ref 0–1.3)
MONOCYTES RELATIVE PERCENT: 8.8 %
NEUTROPHILS ABSOLUTE: 4.4 K/UL (ref 1.7–7.7)
NEUTROPHILS RELATIVE PERCENT: 49.5 %
PDW BLD-RTO: 13.5 % (ref 12.4–15.4)
PERFORMED ON: ABNORMAL
PLATELET # BLD: 277 K/UL (ref 135–450)
PMV BLD AUTO: 8.4 FL (ref 5–10.5)
POTASSIUM REFLEX MAGNESIUM: 4.6 MMOL/L (ref 3.5–5.1)
RBC # BLD: 5.32 M/UL (ref 4.2–5.9)
SODIUM BLD-SCNC: 138 MMOL/L (ref 136–145)
TOTAL PROTEIN: 6.7 G/DL (ref 6.4–8.2)
TROPONIN: <0.01 NG/ML
WBC # BLD: 8.9 K/UL (ref 4–11)

## 2023-03-10 PROCEDURE — 6360000004 HC RX CONTRAST MEDICATION: Performed by: INTERNAL MEDICINE

## 2023-03-10 PROCEDURE — 6370000000 HC RX 637 (ALT 250 FOR IP): Performed by: NURSE PRACTITIONER

## 2023-03-10 PROCEDURE — 4A023N7 MEASUREMENT OF CARDIAC SAMPLING AND PRESSURE, LEFT HEART, PERCUTANEOUS APPROACH: ICD-10-PCS | Performed by: INTERNAL MEDICINE

## 2023-03-10 PROCEDURE — C1769 GUIDE WIRE: HCPCS

## 2023-03-10 PROCEDURE — 93005 ELECTROCARDIOGRAM TRACING: CPT | Performed by: INTERNAL MEDICINE

## 2023-03-10 PROCEDURE — 6360000002 HC RX W HCPCS: Performed by: INTERNAL MEDICINE

## 2023-03-10 PROCEDURE — C1894 INTRO/SHEATH, NON-LASER: HCPCS

## 2023-03-10 PROCEDURE — 93458 L HRT ARTERY/VENTRICLE ANGIO: CPT

## 2023-03-10 PROCEDURE — B2151ZZ FLUOROSCOPY OF LEFT HEART USING LOW OSMOLAR CONTRAST: ICD-10-PCS | Performed by: INTERNAL MEDICINE

## 2023-03-10 PROCEDURE — 99222 1ST HOSP IP/OBS MODERATE 55: CPT | Performed by: INTERNAL MEDICINE

## 2023-03-10 PROCEDURE — B2111ZZ FLUOROSCOPY OF MULTIPLE CORONARY ARTERIES USING LOW OSMOLAR CONTRAST: ICD-10-PCS | Performed by: INTERNAL MEDICINE

## 2023-03-10 PROCEDURE — 80053 COMPREHEN METABOLIC PANEL: CPT

## 2023-03-10 PROCEDURE — 36415 COLL VENOUS BLD VENIPUNCTURE: CPT

## 2023-03-10 PROCEDURE — 84484 ASSAY OF TROPONIN QUANT: CPT

## 2023-03-10 PROCEDURE — 99153 MOD SED SAME PHYS/QHP EA: CPT

## 2023-03-10 PROCEDURE — 2500000003 HC RX 250 WO HCPCS

## 2023-03-10 PROCEDURE — 6370000000 HC RX 637 (ALT 250 FOR IP): Performed by: INTERNAL MEDICINE

## 2023-03-10 PROCEDURE — 6360000002 HC RX W HCPCS

## 2023-03-10 PROCEDURE — 2709999900 HC NON-CHARGEABLE SUPPLY

## 2023-03-10 PROCEDURE — 93010 ELECTROCARDIOGRAM REPORT: CPT | Performed by: INTERNAL MEDICINE

## 2023-03-10 PROCEDURE — 83036 HEMOGLOBIN GLYCOSYLATED A1C: CPT

## 2023-03-10 PROCEDURE — 99152 MOD SED SAME PHYS/QHP 5/>YRS: CPT

## 2023-03-10 PROCEDURE — 6360000002 HC RX W HCPCS: Performed by: NURSE PRACTITIONER

## 2023-03-10 PROCEDURE — 85520 HEPARIN ASSAY: CPT

## 2023-03-10 PROCEDURE — 85025 COMPLETE CBC W/AUTO DIFF WBC: CPT

## 2023-03-10 RX ORDER — ATORVASTATIN CALCIUM 80 MG/1
80 TABLET, FILM COATED ORAL NIGHTLY
Qty: 30 TABLET | Refills: 3 | Status: SHIPPED | OUTPATIENT
Start: 2023-03-10 | End: 2023-03-15 | Stop reason: ALTCHOICE

## 2023-03-10 RX ORDER — ASPIRIN 81 MG/1
81 TABLET, CHEWABLE ORAL DAILY
Qty: 30 TABLET | Refills: 3 | COMMUNITY
Start: 2023-03-11

## 2023-03-10 RX ADMIN — HEPARIN SODIUM 12 UNITS/KG/HR: 10000 INJECTION, SOLUTION INTRAVENOUS at 02:57

## 2023-03-10 RX ADMIN — MORPHINE SULFATE 2 MG: 2 INJECTION, SOLUTION INTRAMUSCULAR; INTRAVENOUS at 13:10

## 2023-03-10 RX ADMIN — IOPAMIDOL 47 ML: 755 INJECTION, SOLUTION INTRAVENOUS at 17:37

## 2023-03-10 RX ADMIN — MORPHINE SULFATE 2 MG: 2 INJECTION, SOLUTION INTRAMUSCULAR; INTRAVENOUS at 02:32

## 2023-03-10 RX ADMIN — NITROGLYCERIN 0.5 INCH: 20 OINTMENT TOPICAL at 06:54

## 2023-03-10 RX ADMIN — HEPARIN SODIUM 2000 UNITS: 1000 INJECTION INTRAVENOUS; SUBCUTANEOUS at 02:52

## 2023-03-10 RX ADMIN — NITROGLYCERIN 0.5 INCH: 20 OINTMENT TOPICAL at 13:11

## 2023-03-10 RX ADMIN — ASPIRIN 81 MG: 81 TABLET, CHEWABLE ORAL at 09:40

## 2023-03-10 ASSESSMENT — PAIN SCALES - GENERAL
PAINLEVEL_OUTOF10: 3
PAINLEVEL_OUTOF10: 2
PAINLEVEL_OUTOF10: 7
PAINLEVEL_OUTOF10: 3

## 2023-03-10 ASSESSMENT — PAIN DESCRIPTION - LOCATION: LOCATION: CHEST

## 2023-03-10 NOTE — PROGRESS NOTES
4 Eyes Skin Assessment     NAME:  Franck uA  YOB: 1965  MEDICAL RECORD NUMBER:  [id number]    The patient is being assess for  Admission    I agree that 2 RN's have performed a thorough Head to Toe Skin Assessment on the patient. ALL assessment sites listed below have been assessed. Areas assessed by both nurses:    Head, Face, Ears, Shoulders, Back, Chest, Arms, Elbows, Hands, Sacrum. Buttock, Coccyx, Ischium, and Legs. Feet and Heels        Does the Patient have a Wound?  No noted wound(s)       Farzad Prevention initiated:  No   Wound Care Orders initiated:  No    Pressure Injury (Stage 3,4, Unstageable, DTI, NWPT, and Complex wounds) if present place consult order under [de-identified] No    New and Established Ostomies if present place consult order under : No      Nurse 1 eSignature: Electronically signed by Day Holman RN on 3/9/23 at 11:34 PM EST    **SHARE this note so that the co-signing nurse is able to place an eSignature**    Nurse 2 eSignature: Electronically signed by Thu Bañuelos RN on 3/9/23 at 11:35 PM EST

## 2023-03-10 NOTE — PROGRESS NOTES
Pt down to cath lab a this time, consent signed, pt removed everything from waist down and heparin drip stop.

## 2023-03-10 NOTE — DISCHARGE SUMMARY
[facility] -- Physician Discharge Summary     Beverly Barillas  1965  MRN: [id number]    Admit Date: 3/9/2023  Discharge Date: No discharge date for patient encounter. Attending MD: Mark Farias MD  Discharging MD: Mark Farias MD  PCP: Dick Caicedo MD [address] [facility] / Katie Summers [address] [phone]    Admission Diagnosis: Unstable angina ([address] [I20.0]  Abnormal stress test [R94.39]  DISCHARGE DIAGNOSIS: same    Full Hospital Problem List:  Active Hospital Problems    Diagnosis Date Noted    Unstable angina ([address] [I20.0] 03/09/2023     Priority: Medium    Essential hypertension [I10] 03/09/2023     Priority: Medium    Type 2 diabetes mellitus without complication, without long-term current use of insulin ([address] [E11.9]     Elevated cholesterol [E78.00] 02/04/2013    Mixed anxiety and depressive disorder [F41.8] 07/06/2011           Hospital Course:  56 y.o. male who presents to the ER from stress lab with abnormal stress test for admission. + family hx of heart disease, father had cardiac bypass in his 63's. Mom is living and in her [de-identified] and just had a pacemaker. Reports elephant sitting on chest sensation that has been constant for 2 months. Reports some exertional sob. No mitigating or exacerbating factors. Abnormal stress test today, sent for unstable angina and admission. Hx of diabetes, wears a sensor. Never a smoker. No HTN, no high cholesterol.      Pt started on heparin drip  Cards consultd  Plan for cath    Per dr Loyd Cehn   Artery Findings   LM Normal   LAD Normal   Cx Normal   RI N/A   RCA Normal   LVEDP 7mmHg Normal 3-12mmHg   LVG Normal with EF >55% Normal >/= 55%   AVG Normal      INTERVENTION(S)   None         POST CATH DIAGNOSIS   CORONARIES Normal   LVEDP Normal   EF Normal   WALL MOTION Normal   RECOMMENDATIONS Continued aggressive medical therapy and risk factor modification        Pt to go home on ASA and statin    To f/u with cards or PCP in 4wk    Consults made during Hospitalization:  IP CONSULT TO CARDIOLOGY  IP CONSULT TO HOSPITALIST  IP CONSULT TO INTERNAL MEDICINE  IP CONSULT TO CARDIOLOGY    Treatment team at time of Discharge: Treatment Team: Attending Provider: Jan Gabriel MD; Consulting Physician: Danelle Powers MD; Consulting Physician: Blanca Stanton MD; Consulting Physician: Jan Gabriel MD; Respiratory Therapist (Day): Bard Margoth RCP; Registered Nurse: John Valle RN; Registered Nurse: Jazmyne Renner RN; Respiratory Therapist (Night): Lyndsay Mark RCP    Imaging Results:  XR CHEST (2 VW)    Result Date: 3/2/2023  EXAMINATION: TWO XRAY VIEWS OF THE CHEST 3/1/2023 3:07 pm COMPARISON: Chest x-ray dated 01/05/2022. HISTORY: ORDERING SYSTEM PROVIDED HISTORY: Chest pain, unspecified type FINDINGS: HEART/MEDIASTINUM: The cardiomediastinal silhouette is within normal limits. PLEURA/LUNGS: There are no focal consolidations or pleural effusions. There is no appreciable pneumothorax. BONES/SOFT TISSUE: No acute abnormality. No radiographic evidence of acute pulmonary disease. Discharge Exam:  See note from day of d/c    Disposition: home    Condition: stable    Discharge Medications:     Medication List        START taking these medications      aspirin 81 MG chewable tablet  Take 1 tablet by mouth daily  Start taking on: March 11, 2023     atorvastatin 80 MG tablet  Commonly known as: LIPITOR  Take 1 tablet by mouth nightly            CONTINUE taking these medications      CeQur Simplicity 2U Marija  Generic drug: Injection Device for Insulin     * Dexcom G6 Sensor Misc     * Dexcom G6 Sensor Misc  Change every 10 days. * Dexcom G6 Transmitter Misc     * Dexcom G6 Transmitter Misc  CHANGE EVERY 3 MONTHS.      Insulin Pen Needle 32G X 5 MM Misc  1 each by Does not apply route daily     NovoLOG 100 UNIT/ML injection vial  Generic drug: insulin aspart     Trulicity 4.4 YO/6.5KQ Sopn  Generic drug: Dulaglutide  Inject 4.5 mg into the skin once a week           * This list has 4 medication(s) that are the same as other medications prescribed for you. Read the directions carefully, and ask your doctor or other care provider to review them with you. Where to Get Your Medications        [facility] [address] [facility] [phone]  [address],  [address] [facility]      Phone: [phone]   atorvastatin 80 MG tablet       You can get these medications from any pharmacy    You don't need a prescription for these medications  aspirin 81 MG chewable tablet            Allergies: Allergies   Allergen Reactions    Indomethacin      diarrhea       Follow up Instructions: Follow-up with PCP: Nicanor Hou MD in 2 wk .       Total time spent on day of discharge including face-to-face visit, examination, documentation, counseling, preparation of discharge plans and followup, and discharge medicine reconciliation and presciptions is 33 minutes    Signed:  Rose Wakefield MD  3/10/2023

## 2023-03-10 NOTE — H&P
History and Physical  Dr. Yoni Mcguire  3/9/2023    PCP: Denise Cuello MD    Cc:   Chief Complaint   Patient presents with    Abnormal Test Results     Pt from cardiology, states he just had a stress test and it came back abnormal, concerned for blockage        HPI:  Romeo James is a 56 y.o. male who has a past medical history of Depression, Gout, Hyperlipidemia, Type II or unspecified type diabetes mellitus without mention of complication, not stated as uncontrolled, and Unspecified sleep apnea. Patient presents with Unstable angina ([address] HPI  (1-3 for expanded problem focused, ?4 for detailed/comprehensive)     56 y.o. male who presents to the ER from stress lab with abnormal stress test for admission. + family hx of heart disease, father had cardiac bypass in his 63's. Mom is living and in her [de-identified] and just had a pacemaker. Reports elephant sitting on chest sensation that has been constant for 2 months. Reports some exertional sob. No mitigating or exacerbating factors. Abnormal stress test today, sent for unstable angina and admission. Hx of diabetes, wears a sensor. Never a smoker. No HTN, no high cholesterol. Pt started on heparin drip  Cards consultd  Plan for cath in am    Problem list of hospitalization thus far: Active Hospital Problems    Diagnosis     Unstable angina (HCC) [I20.0]      Priority: Medium    Essential hypertension [I10]      Priority: Medium    Type 2 diabetes mellitus without complication, without long-term current use of insulin (HCC) [E11.9]     Elevated cholesterol [E78.00]     Mixed anxiety and depressive disorder [F41.8]          Review of Systems: (1 system for EPF, 2-9 for detailed, 10+ for comprehensive)  Constitutional: Negative for chills and fever. HENT: Negative for dental problem, nosebleeds and rhinorrhea. Eyes: Negative for photophobia and visual disturbance. Respiratory: Negative for cough, chest tightness and shortness of breath. Cardiovascular: positibve for chest pain and leg swelling. Gastrointestinal: Negative for diarrhea, nausea and vomiting. Endocrine: Negative for polydipsia and polyphagia. Genitourinary: Negative for frequency, hematuria and urgency. Musculoskeletal: Negative for back pain and myalgias. Skin: Negative for rash. Allergic/Immunologic: Negative for food allergies. Neurological: Negative for dizziness, seizures, syncope and facial asymmetry. Hematological: Negative for adenopathy. Psychiatric/Behavioral: Negative for dysphoric mood. The patient is not nervous/anxious. Past Medical History:   Past Medical History:   Diagnosis Date    Depression     Gout     Hyperlipidemia     Type II or unspecified type diabetes mellitus without mention of complication, not stated as uncontrolled     Unspecified sleep apnea        Past Surgical History:   Past Surgical History:   Procedure Laterality Date    WISDOM TOOTH EXTRACTION  1986       Social History:   Social History       Tobacco History       Smoking Status  Never      Smokeless Tobacco Use  Never              Alcohol History       Alcohol Use Status  No              Drug Use       Drug Use Status  No              Sexual Activity       Sexually Active  Not Asked                    Fam History:   Family History   Problem Relation Age of Onset    Cancer Father 39        colon    Diabetes Father     Alzheimer's Disease Father     Diabetes Mother     Diabetes Maternal Aunt     Cancer Sister         breast       PFSH: The above PMHx, PSHx, SocHx, FamHx has been reviewed by myself. (1 area for detailed, 2-3 for comprehensive)      Code Status: No Order    Meds - following list of home medications fromelectronic chart has been reviewed by myself  Prior to Admission medications    Medication Sig Start Date End Date Taking?  Authorizing Provider   nystatin (MYCOSTATIN) POWD powder Apply 1 each topically daily    Historical Provider, MD doxycycline hyclate (VIBRAMYCIN) 100 MG capsule TAKE ONE TAB BY MOUTH TWICE A DAY WITH FULL MEAL AND WATER FOR 10 DAYS 2/17/23   Historical Provider, MD   CLINDAMYCIN PHOSPHATE,TOPICAL, 1 % SWAB USE TO WIPE AFFECTED AREA ON BACK AND CHEST TWICE A DAY 2/17/23   Historical Provider, MD Abimbola Isaacs  2/0/22   Historical Provider, MD   Continuous Blood Gluc Sensor (DEXCOM G6 SENSOR) MISC by Other route    Historical Provider, MD   Continuous Blood Gluc Transmit (DEXCOM G6 TRANSMITTER) MISC by Other route    Historical Provider, MD   acarbose (PRECOSE) 25 mg tablet TAKE 1 TABLET BY MOUTH 3 TIMES DAILY WITH MEALS 6/13/22   Jenny Schultz MD   TRESIBA FLEXTOUCH 100 UNIT/ML SOPN INJECT 15 UNITS INTO THE SKIN NIGHTLY  Patient not taking: Reported on 3/1/2023 6/10/22   Jenny Schultz MD   pioglitazone (ACTOS) 45 MG tablet TAKE 1 TABLET BY MOUTH EVERY DAY IN THE MORNING  Patient not taking: Reported on 3/1/2023 6/7/22   Jenny Schultz MD   Continuous Blood Gluc Transmit (DEXCOM G6 TRANSMITTER) MISC CHANGE EVERY 3 MONTHS. 5/11/22   Jenny Schultz MD   Continuous Blood Gluc Sensor (DEXCOM G6 SENSOR) MISC Change every 10 days.  5/11/22   Jenny Schultz MD   prochlorperazine (COMPAZINE) 10 MG tablet Take 1 tablet by mouth every 8 hours as needed (nausea)  Patient not taking: Reported on 3/1/2023 3/28/22   Jenny Schultz MD   dicyclomine (BENTYL) 20 MG tablet Take 1 tablet by mouth every 8 hours as needed (abdominal cramping)  Patient not taking: Reported on 3/1/2023 3/28/22   Jenny Schultz MD   albuterol sulfate  (90 Base) MCG/ACT inhaler Inhale 2 puffs into the lungs every 6 hours as needed for Wheezing or Shortness of Breath 1/5/22   Jenny Schultz MD   meloxicam (MOBIC) 15 MG tablet Take 1 tablet by mouth daily as needed for Pain (arthritis)  Patient not taking: Reported on 3/1/2023 1/5/22   Jenny Schultz MD   fluvoxaMINE (LUVOX) 25 MG tablet Take 1 tablet by mouth nightly  Patient not taking: Reported on 3/1/2023 1/5/22   Antonia Foley MD   Insulin Pen Needle 32G X 5 MM MISC 1 each by Does not apply route daily 12/21/21   Antonia Foley MD   fluticasone-umeclidin-vilant (TRELEGY ELLIPTA) 794-07.5-39 MCG/INH AEPB Inhale 1 puff into the lungs daily  Patient not taking: Reported on 3/1/2023 12/21/21   Antonia Foley MD   vitamin D (ERGOCALCIFEROL) 1.25 MG (86027 UT) CAPS capsule Take 1 capsule by mouth once a week  Patient not taking: No sig reported 12/21/21   Antonia Foley MD   Nasal Dilators (BREATHE RIGHT EXTRA STRENGTH) STRP 1 strip by Does not apply route nightly as needed (nasal congestion)  Patient not taking: Reported on 3/1/2023 12/8/21   Antonia Foley MD   fluticasone Naye Gilmar) 50 MCG/ACT nasal spray 2 sprays by Each Nostril route daily  Patient not taking: Reported on 3/1/2023 12/8/21   Antonia Foley MD   Dulaglutide (TRULICITY) 4.5 ZI/8.3ET SOPN Inject 4.5 mg into the skin once a week 12/8/21   Antonia Foley MD   glimepiride (AMARYL) 4 MG tablet Take 1 tablet by mouth 2 times daily  Patient not taking: Reported on 3/1/2023 12/8/21   Antonia Foley MD   Dapagliflozin-metFORMIN HCl ER (XIGDUO XR) 5-1000 MG TB24 TAKE 1 TABLET BY MOUTH TWICE A DAY  Patient not taking: Reported on 3/1/2023 12/8/21   Antonia Foley MD   potassium chloride (KLOR-CON M) 20 MEQ extended release tablet Take 1 tablet by mouth daily  Patient not taking: Reported on 3/1/2023 12/8/21   Antonia Foley MD   diclofenac sodium (VOLTAREN) 1 % GEL Apply 2 g topically every 6 hours as needed for Pain (back pain)  Patient not taking: Reported on 3/1/2023 3/10/21   Antonia Foley MD         Allergies   Allergen Reactions    Indomethacin      diarrhea             EXAM: (2-7 system for EPF/Detailed, ?8 for Comprehensive)  /78   Pulse (!) 107   Temp 97.9 °F (36.6 °C)   Resp 19   Wt 216 lb (98 kg)   SpO2 100%   BMI 29.29 kg/m²   Constitutional: vitals as above: alert, appears stated age and cooperative    Psychiatric: normal insight and judgment, oriented to person, place, time, and general circumstances    Head: Normocephalic, without obvious abnormality, atraumatic    Eyes:lids and lashes normal, conjunctivae and sclerae normal and pupils equal, round, reactive to light and accomodation    EMNT: external ears normal, nares midline    Neck: no carotid bruit, supple, symmetrical, trachea midline and thyroid not enlarged, symmetric, no tenderness/mass/nodules     Respiratory: clear to auscultation and percussion bilaterally with normal respiratory effort    Cardiovascular: normal rate, regular rhythm, normal S1 and S2 and no murmurs    Gastrointestinal: soft, non-tender, non-distended, normal bowel sounds, no masses or organomegaly    Extremities: no clubbing, no edema    Skin:No rashes or nodules noted. Neurologic:negative         LABS:  Labs Reviewed   COMPREHENSIVE METABOLIC PANEL - Abnormal; Notable for the following components:       Result Value    Glucose 159 (*)     All other components within normal limits   CBC WITH AUTO DIFFERENTIAL   TROPONIN   BRAIN NATRIURETIC PEPTIDE   APTT   PROTIME-INR   ANTI-XA, UNFRACTIONATED HEPARIN   ANTI-XA, UNFRACTIONATED HEPARIN         IMAGING:  Imaging results from computerized chart. Any imaging that has been independently reviewed as noted elsewhere in chart. XR CHEST (2 VW)    Result Date: 3/2/2023  EXAMINATION: TWO XRAY VIEWS OF THE CHEST 3/1/2023 3:07 pm COMPARISON: Chest x-ray dated 01/05/2022. HISTORY: ORDERING SYSTEM PROVIDED HISTORY: Chest pain, unspecified type FINDINGS: HEART/MEDIASTINUM: The cardiomediastinal silhouette is within normal limits. PLEURA/LUNGS: There are no focal consolidations or pleural effusions. There is no appreciable pneumothorax. BONES/SOFT TISSUE: No acute abnormality. No radiographic evidence of acute pulmonary disease.          EKG:   EKG interpreted by self - it shows normal sinus at 80. No st elevation noted  Old chart reviewed, no prev EKG avail for review    Lab Results   Component Value Date/Time    GLUCOSE 159 03/09/2023 06:17 PM     No results found for: POCGLU  /78   Pulse (!) 107   Temp 97.9 °F (36.6 °C)   Resp 19   Wt 216 lb (98 kg)   SpO2 100%   BMI 29.29 kg/m²     MEDICAL DECISION MAKING:    Principal Problem:    Unstable angina (HCC) -New Problem to me. Pt with abnl stress test - review of electronic chart shows \"Abnormal stress ECHO suggestive of anteroapical scar and/or ischemia\" from outside facility  Plan: Pt to be admitted to telemetry floor. Serial cardiac enzymes ordered. Iv heparin drip to be ordered. Will order ASA, NTG. Pt has IV morphine ordered for pain control. Cards to see, npo for planned cath  Active Problems:    Essential hypertension -Established problem. Stable. 121/78  Plan: Pt home BP meds reviewed and will be continued. IV Hydralazine ordered for control of extremely high blood pressures. Will monitor labs to assess Creat/K for possible complications of medications. Mixed anxiety and depressive disorder -Established problem. Stable. Plan: cont on home meds    Elevated cholesterol -Established problem. Stable. Plan: cont on statin. Check fasting lipid profile    Type 2 diabetes mellitus without complication, without long-term current use of insulin ([address] -Established problem. Stable. 121/78  Plan: Patient placed on controlled carbohydrate diet. Fingerstick sugars to be checked to monitor for both hypoglycemia as well as hyperglycemia. Sliding scale insulin ordered. Glucagon and dextrose ordered for hypoglycemia. Patient will be continued on home medications. Hemoglobin a1c to be ordered to assess efficacy of therapy. Diagnoses as listed above, designated as new or established and plan outlined for each.       Case discussed with: PAIGE judge    MDM Determination    PROBLEM  High: life threatening unstable chronic illness (I.e. severe COPD, asthma) or acute illness (i.e. MI, PE, BRIANA, stroke,severe resp distress, acute change in neurologic status, suicidal ideation) - unstable angina  PLUS  high: iv narcotics or other iv meds which require monitoring (e.g. digoxin, amiodarone, vancomycin, coumadin, heparin, antiepileptics, chemotherapy,insulin drip, procrit) and high: at least 2 of: personally interpret study, discuss with external specialist, review/order 3+ tests - cbc bmp ekg trop    OR TIME BASED  N/A - see problem based determination above           The patient is being placed in inpatient status with the expectation of requiring a hospital stay spanning at least two midnights for care and treatment of the problems noted in the problem list.  This determination is also based on thepatients comorbidities and past medical history, the severity and timing of the signs and symptoms upon presentation.     (Please note that portions of this note were completed with a voice recognition program.  Efforts were made to edit the dictations but occasionally words are mis-transcribed.)      Electronically signed by: Jori Shook MD 3/9/2023

## 2023-03-10 NOTE — CONSULTS
[address]  H+P  Consult  OP Visit  FU Visit   CC/HPI   CC New patient visit for chest pain and abnormal stress . HPI 56 y.o., male  presents to hospital concerned with chest pain, abnormal stress test.    Characterized as persistent, substernal, pressure/Heavy/Tight, worse with nothing, associated with SOB. Cardiac Hx NONE   Troponin Lab Results   Component Value Date    TROPONINI <0.01 03/10/2023      HISTORY/ALLERGY/ROS   MEDHx  has a past medical history of Depression, Gout, Hyperlipidemia, Type II or unspecified type diabetes mellitus without mention of complication, not stated as uncontrolled, and Unspecified sleep apnea. SURGHx  has a past surgical history that includes [place] tooth extraction (1986). SOCHx  reports that he has never smoked. He has never used smokeless tobacco. He reports that he does not drink alcohol and does not use drugs. FAMHx family history includes Alzheimer's Disease in his father; Cancer in his sister; Cancer (age of onset: 39) in his father; Diabetes in his father, maternal aunt, and mother.    ALLERG Indomethacin   ROS Full ROS obtained and negative except as mentioned in HPI   MEDICATIONS   Current Facility-Administered Medications   Medication Dose Route Frequency Provider Last Rate Last Admin    heparin (porcine) injection 4,000 Units  4,000 Units IntraVENous PRN Polo Field, APRN - CNP        heparin (porcine) injection 2,000 Units  2,000 Units IntraVENous PRN Polo Field, APRN - CNP   2,000 Units at 03/10/23 0252    heparin 25,000 units in dextrose 5% 250 mL (premix) infusion  5-30 Units/kg/hr IntraVENous Continuous Polo Field, APRN - CNP 11.8 mL/hr at 03/10/23 0257 12 Units/kg/hr at 03/10/23 0257    nitroglycerin (NITRO-BID) 2 % ointment 0.5 inch  0.5 inch Topical 4 times per day Polo Field, APRN - CNP   0.5 inch at 03/10/23 0745    Dulaglutide SOPN 4.5 mg (Patient Supplied)  4.5 mg SubCUTAneous Weekly Rose Wakefield MD sodium chloride flush 0.9 % injection 5-40 mL  5-40 mL IntraVENous 2 times per day Deniz Lepe MD   10 mL at 03/09/23 2302    sodium chloride flush 0.9 % injection 10 mL  10 mL IntraVENous PRN Deniz Lepe MD   10 mL at 03/09/23 2300    0.9 % sodium chloride infusion   IntraVENous PRN Deniz Lepe MD        ondansetron (ZOFRAN-ODT) disintegrating tablet 4 mg  4 mg Oral Q8H PRN Deniz Lepe MD        Or    ondansetron [facility]) injection 4 mg  4 mg IntraVENous Q6H PRN Deniz Lepe MD        acetaminophen (TYLENOL) tablet 650 mg  650 mg Oral Q6H PRN Deniz Lepe MD        Or    acetaminophen (TYLENOL) suppository 650 mg  650 mg Rectal Q6H PRN Deniz Lepe MD        magnesium hydroxide (MILK OF MAGNESIA) 400 MG/5ML suspension 30 mL  30 mL Oral Daily PRN Deniz Lepe MD        aspirin chewable tablet 81 mg  81 mg Oral Daily Deniz Lepe MD        atorvastatin (LIPITOR) tablet 80 mg  80 mg Oral Nightly Deniz Lepe MD   80 mg at 03/09/23 2300    nitroGLYCERIN (NITROSTAT) SL tablet 0.4 mg  0.4 mg SubLINGual Q5 Min PRN Deniz Lepe MD        morphine (PF) injection 2 mg  2 mg IntraVENous Q4H PRN Deniz Lepe MD   2 mg at 03/10/23 0232    hydrALAZINE (APRESOLINE) injection 10 mg  10 mg IntraVENous Q6H PRN Deniz Lepe MD        0.9 % sodium chloride bolus  500 mL IntraVENous PRN Deniz Lepe MD        potassium chloride (KLOR-CON M) extended release tablet 40 mEq  40 mEq Oral PRN Dneiz Lepe MD        Or    potassium bicarb-citric acid (EFFER-K) effervescent tablet 40 mEq  40 mEq Oral PRN Deniz Lepe MD        Or    potassium chloride 10 mEq/100 mL IVPB (Peripheral Line)  10 mEq IntraVENous PRN Deniz Lepe MD        insulin lispro (HUMALOG) injection vial 0-8 Units  0-8 Units SubCUTAneous TID WC Deniz Lepe MD        insulin lispro (HUMALOG) injection vial 0-4 Units  0-4 Units SubCUTAneous Nightly Deniz Lepe MD        dextrose bolus 10% 125 mL 125 mL IntraVENous PRN Mel Cornejo MD        Or    dextrose bolus 10% 250 mL  250 mL IntraVENous PRN Mel Cornejo MD        glucagon (rDNA) injection 1 mg  1 mg SubCUTAneous PRN Mel Cornejo MD        dextrose 10 % infusion   IntraVENous Continuous PRN Mel Cornejo MD          PHYSICAL EXAM   Vitals /63   Pulse 77   Temp 97.2 °F (36.2 °C) (Temporal)   Resp 15   Ht 6' (1.829 m)   Wt 214 lb 8.1 oz (97.3 kg)   SpO2 98%   BMI 29.09 kg/m²    Gen Alert, coop, no distress Heart  RRR, no MRG   Head NC, AT, no abnorm Abd  Soft, NT, +BS, no mass, no OM   Eyes PER, conj/corn clear Ext  Ext nl, AT, no C/C/E   Nose Nares nl, no drain, NT Pulse 2+ and symmetric   Throat Lips, mucosa, tongue nl Skin Col/text/turg nl, no vis rash/les   Neck S/S, TM, NT, no bruit/JVD Psych Nl mood and affect   Lung CTA-B, unlabored, no DTP Lymph   No cervical or axillary LA   Ch wall NT, no deform Neuro  Nl gross M/S exam      ASSESSMENT TIME      ASSESSMENT AND PLAN   *CP   Date EF Results   Sx   Unstable angina.    STTE 3/23 40% Abnormal stress suggesting anteroapical scar and/or ischemia   TTE 3/23 40% HK   Plan   [facility], R/B/O discussed

## 2023-03-10 NOTE — OP NOTE
[address]  [facility] Operative Note     PROCEDURE SUMMARY   Procedure [facility]   Indication UA   Consent Obtained   Access RRA   US US guidance used to determine artery patency, size (>2mm), anatomic variations and ideal puncture location. Real-time US utilized concurrent with vascular needle entry into artery. Image(s) permanently recorded and reported in chart. Bleed Risk Low   Sedation Minimal conscious sedation for patient comfort. Independent trained observer pushed medications at my direction. We monitored the patient's level of consciousness and vital signs/physiologic status throughout the procedure duration (see start and stop times above, as well as medication dosages).    Start Time 1710   Stop Time 1735   Versed 2mg   Fentanyl 75mcg   Contrast 47cc   Flouro 1.7min   EBL <54KM   Complicat None   Specimens None     FINDINGS   Artery Findings   LM Normal   LAD Normal   Cx Normal   RI N/A   RCA Normal   LVEDP 7mmHg Normal 3-12mmHg   LVG Normal with EF >55% Normal >/= 55%   AVG Normal     INTERVENTION(S)   None    POST CATH DIAGNOSIS   CORONARIES Normal   LVEDP Normal   EF Normal   WALL MOTION Normal   RECOMMENDATIONS Continued aggressive medical therapy and risk factor modification

## 2023-03-10 NOTE — PROGRESS NOTES
Progress Note - Dr. Xiao Montanez - Internal Medicine  PCP: Hermann Mclain MD [address] [facility] [place] [phone]    Hospital Day: 1  Code Status: Full Code  Current Diet: Diet NPO        CC: follow up on medical issues    Subjective:   Aby Gonazlez is a 56 y.o. male. Pt seen and examined  Chart reviewed since last visit, labs and imaging below      Still with some chest pain  Rates it 3 or 4 now  More heaviness as well    Awaiting cath  On heparin drip      Review of Systems: (1 system for EPF, 2-9 for detailed, 10+ for comprehensive)  Constitutional: Negative for chills and fever. HENT: Negative for dental problem, nosebleeds and rhinorrhea. Eyes: Negative for photophobia and visual disturbance. Respiratory: Negative for cough, chest tightness and shortness of breath. Cardiovascular: positive for chest pain and heaviness ; negative for  leg swelling. Gastrointestinal: Negative for diarrhea, nausea and vomiting. Endocrine: Negative for polydipsia and polyphagia. Genitourinary: Negative for frequency, hematuria and urgency. Musculoskeletal: Negative for back pain and myalgias. Skin: Negative for rash. Allergic/Immunologic: Negative for food allergies. Neurological: Negative for dizziness, seizures, syncope and facial asymmetry. Hematological: Negative for adenopathy. Psychiatric/Behavioral: Negative for dysphoric mood. The patient is not nervous/anxious. I have reviewed the patient's medical and social history in detail and updated the computerized patient record. To recap: He  has a past medical history of Depression, Gout, Hyperlipidemia, Type II or unspecified type diabetes mellitus without mention of complication, not stated as uncontrolled, and Unspecified sleep apnea. . He  has a past surgical history that includes [place] tooth extraction (1986). Ray Gastelum He  reports that he has never smoked.  He has never used smokeless tobacco. He reports that he does not drink alcohol and does not use drugs. .        Active Hospital Problems    Diagnosis Date Noted    Unstable angina ([facility] [address] [I20.0] 03/09/2023     Priority: Medium    Essential hypertension [I10] 03/09/2023     Priority: Medium    Type 2 diabetes mellitus without complication, without long-term current use of insulin (HCC) [E11.9]     Elevated cholesterol [E78.00] 02/04/2013    Mixed anxiety and depressive disorder [F41.8] 07/06/2011       Current Facility-Administered Medications: heparin (porcine) injection 4,000 Units, 4,000 Units, IntraVENous, PRN  heparin (porcine) injection 2,000 Units, 2,000 Units, IntraVENous, PRN  heparin 25,000 units in dextrose 5% 250 mL (premix) infusion, 5-30 Units/kg/hr, IntraVENous, Continuous  nitroglycerin (NITRO-BID) 2 % ointment 0.5 inch, 0.5 inch, Topical, 4 times per day  Dulaglutide SOPN 4.5 mg (Patient Supplied), 4.5 mg, SubCUTAneous, Weekly  sodium chloride flush 0.9 % injection 5-40 mL, 5-40 mL, IntraVENous, 2 times per day  sodium chloride flush 0.9 % injection 10 mL, 10 mL, IntraVENous, PRN  0.9 % sodium chloride infusion, , IntraVENous, PRN  ondansetron (ZOFRAN-ODT) disintegrating tablet 4 mg, 4 mg, Oral, Q8H PRN **OR** ondansetron (ZOFRAN) injection 4 mg, 4 mg, IntraVENous, Q6H PRN  acetaminophen (TYLENOL) tablet 650 mg, 650 mg, Oral, Q6H PRN **OR** acetaminophen (TYLENOL) suppository 650 mg, 650 mg, Rectal, Q6H PRN  magnesium hydroxide (MILK OF MAGNESIA) 400 MG/5ML suspension 30 mL, 30 mL, Oral, Daily PRN  aspirin chewable tablet 81 mg, 81 mg, Oral, Daily  atorvastatin (LIPITOR) tablet 80 mg, 80 mg, Oral, Nightly  nitroGLYCERIN (NITROSTAT) SL tablet 0.4 mg, 0.4 mg, SubLINGual, Q5 Min PRN  morphine (PF) injection 2 mg, 2 mg, IntraVENous, Q4H PRN  hydrALAZINE (APRESOLINE) injection 10 mg, 10 mg, IntraVENous, Q6H PRN  0.9 % sodium chloride bolus, 500 mL, IntraVENous, PRN  potassium chloride (KLOR-CON M) extended release tablet 40 mEq, 40 mEq, Oral, PRN **OR** potassium bicarb-citric acid (EFFER-K) effervescent tablet 40 mEq, 40 mEq, Oral, PRN **OR** potassium chloride 10 mEq/100 mL IVPB (Peripheral Line), 10 mEq, IntraVENous, PRN  insulin lispro (HUMALOG) injection vial 0-8 Units, 0-8 Units, SubCUTAneous, TID WC  insulin lispro (HUMALOG) injection vial 0-4 Units, 0-4 Units, SubCUTAneous, Nightly  dextrose bolus 10% 125 mL, 125 mL, IntraVENous, PRN **OR** dextrose bolus 10% 250 mL, 250 mL, IntraVENous, PRN  glucagon (rDNA) injection 1 mg, 1 mg, SubCUTAneous, PRN  dextrose 10 % infusion, , IntraVENous, Continuous PRN         Objective:  /77   Pulse 93   Temp 98.5 °F (36.9 °C) (Temporal)   Resp 15   Ht 6' (1.829 m)   Wt 214 lb 8.1 oz (97.3 kg)   SpO2 96%   BMI 29.09 kg/m²      Patient Vitals for the past 24 hrs:   BP Temp Temp src Pulse Resp SpO2 Height Weight   03/10/23 0830 119/77 98.5 °F (36.9 °C) Temporal 93 -- 96 % -- --   03/10/23 0258 112/63 -- -- 77 15 -- -- 214 lb 8.1 oz (97.3 kg)   03/10/23 0254 -- -- -- 79 14 98 % -- --   03/10/23 0030 111/77 97.2 °F (36.2 °C) Temporal 78 -- 97 % -- --   03/09/23 2328 -- -- -- 81 13 97 % -- --   03/09/23 2218 116/78 97 °F (36.1 °C) Temporal 90 16 -- -- --   03/09/23 2200 -- -- -- -- -- -- 6' (1.829 m) 215 lb (97.5 kg)   03/09/23 2152 125/85 -- -- 90 -- -- -- --   03/09/23 2145 125/85 -- -- 100 -- -- -- --   03/09/23 2130 124/87 97 °F (36.1 °C) Temporal -- 18 96 % -- --   03/09/23 2005 -- -- -- 87 16 99 % -- --   03/09/23 2000 123/88 -- -- 88 13 100 % -- --   03/09/23 1700 121/78 97.9 °F (36.6 °C) -- (!) 107 19 100 % -- 216 lb (98 kg)     Patient Vitals for the past 96 hrs (Last 3 readings):   Weight   03/10/23 0258 214 lb 8.1 oz (97.3 kg)   03/09/23 2200 215 lb (97.5 kg)   03/09/23 1700 216 lb (98 kg)           Intake/Output Summary (Last 24 hours) at 3/10/2023 8175  Last data filed at 3/9/2023 2302  Gross per 24 hour   Intake 20 ml   Output --   Net 20 ml         Physical Exam: (2-7 system for EPF/Detailed, ?8 for Comprehensive)  /77   Pulse 93   Temp 98.5 °F (36.9 °C) (Temporal)   Resp 15   Ht 6' (1.829 m)   Wt 214 lb 8.1 oz (97.3 kg)   SpO2 96%   BMI 29.09 kg/m²   Constitutional: vitals as above: alert, appears stated age and cooperative    Psychiatric: normal insight and judgment, oriented to person, place, time, and general circumstances    Head: Normocephalic, without obvious abnormality, atraumatic    Eyes:lids and lashes normal, conjunctivae and sclerae normal and pupils equal, round, reactive to light and accomodation    EMNT: external ears normal, nares midline    Neck: no carotid bruit, supple, symmetrical, trachea midline and thyroid not enlarged, symmetric, no tenderness/mass/nodules     Respiratory: clear to auscultation and percussion bilaterally with normal respiratory effort    Cardiovascular: normal rate, regular rhythm, normal S1 and S2 and no murmurs    Gastrointestinal: soft, non-tender, non-distended, normal bowel sounds, no masses or organomegaly    Extremities: no clubbing, no edema    Skin:No rashes or nodules noted. Neurologic:negative         Labs:  Lab Results   Component Value Date    WBC 8.9 03/10/2023    HGB 15.0 03/10/2023    HCT 45.9 03/10/2023     03/10/2023    CHOL 150 01/19/2021    TRIG 99 01/19/2021    HDL 40 01/19/2021    ALT 27 03/10/2023    AST 19 03/10/2023     03/10/2023    K 4.6 03/10/2023     03/10/2023    CREATININE 1.2 03/10/2023    BUN 21 (H) 03/10/2023    CO2 28 03/10/2023    TSH 4.25 (H) 06/17/2020    INR 1.08 03/09/2023    LABA1C 10.7 01/05/2022    LABMICR <1.20 01/05/2022     Lab Results   Component Value Date    TROPONINI <0.01 03/10/2023       Recent Imaging Results are Reviewed:  XR CHEST (2 VW)    Result Date: 3/2/2023  EXAMINATION: TWO XRAY VIEWS OF THE CHEST 3/1/2023 3:07 pm COMPARISON: Chest x-ray dated 01/05/2022.  HISTORY: ORDERING SYSTEM PROVIDED HISTORY: Chest pain, unspecified type FINDINGS: HEART/MEDIASTINUM: The cardiomediastinal silhouette is within normal limits. PLEURA/LUNGS: There are no focal consolidations or pleural effusions. There is no appreciable pneumothorax. BONES/SOFT TISSUE: No acute abnormality. No radiographic evidence of acute pulmonary disease. Lab Results   Component Value Date/Time    GLUCOSE 150 03/10/2023 02:19 AM     Lab Results   Component Value Date/Time    POCGLU 155 03/10/2023 07:26 AM     /77   Pulse 93   Temp 98.5 °F (36.9 °C) (Temporal)   Resp 15   Ht 6' (1.829 m)   Wt 214 lb 8.1 oz (97.3 kg)   SpO2 96%   BMI 29.09 kg/m²     Assessment and Plan:  Principal Problem:    Unstable angina ([address] -Established problem. Stable. Still with chest pain/heaviness. Trop neg  Plan: npo for cath. Cont on heparin drip  Active Problems:    Essential hypertension -Established problem. Stable. 119/77  Plan: Continue medications. Continue to check BP q shift. CBC and BMP ordered to monitor for disease progression, medication side effect. Mixed anxiety and depressive disorder  Plan: cont on home meds    Elevated cholesterol -Established problem. Stable. On atorvastatin  Plan: cont meds. Check fasting lipids    Type 2 diabetes mellitus without complication, without long-term current use of insulin ([address] -Established problem. Stable. Fsbs 155  Plan: Continue on CCC diet, home medications. CBC and BMP ordered to monitor sugars and for other medication complications. Fingerstick glucose ordered to check for alterations in levels throughout day. Sliding scale insulin ordered to cover fingerstick levels.        Case discussed with: rn  Tests ordered/reviewed: cbc, bmp, ekg          (Please note that portions of this note were completed with a voice recognition program.  Efforts were made to edit the dictations but occasionally words are mis-transcribed.)        Grover Cornejo MD  3/10/2023

## 2023-03-10 NOTE — PLAN OF CARE
Problem: Discharge Planning  Goal: Discharge to home or other facility with appropriate resources  3/10/2023 0451 by Mora Kwok RN  Outcome: Progressing     Problem: Respiratory - Adult  Goal: Achieves optimal ventilation and oxygenation  Outcome: Progressing     Problem: Cardiovascular - Adult  Goal: Maintains optimal cardiac output and hemodynamic stability  Outcome: Progressing     Problem: Metabolic/Fluid and Electrolytes - Adult  Goal: Electrolytes maintained within normal limits  Outcome: Progressing     Problem: Hematologic - Adult  Goal: Maintains hematologic stability  Outcome: Progressing

## 2023-03-10 NOTE — PRE SEDATION
[address]  Pre-sedation Assessment   PROCEDURE   Planned Procedure Cardiac catheterization   Consent I have discussed with the patient and/or the patient representative the indication, alternatives, and the possible risks and/or complications of the planned procedure and the anesthesia methods. The patient and/or patient representative appear to understand and agree to proceed. INDICATION   Chief Complaint Chest Pain/Pressure  Anginal Equivalent  Dyspnea on Exertion   Presentation New Onset Angina <= 2 months, Worsening Angina, and LV Dysfunction   Anginal Class (2 wks) CCS III - Symptoms with everyday living activities, i.e., moderate limitation   Anginal Symptoms Typical chest pain or anginal equivalent   CHF Class (2 wks) Yes:  Heart Failure Type: Systolic Severity:  Class II - Symptoms of HF on ordinary exertion   CV Instability Yes   ISCHEMIC WORKUP/MANAGEMENT/EVALUTION   Stress Test Yes:  Stress or Imaging Studies Performed (within ANY time period):   Type:  Stress Echo  Results:  Positive:  Wall Motion Changes (Echo) Extent of Ischemia:  High Risk (>3% annual death or MI)   Anginal Meds Yes: Aspirin and Long Acting Nitrates (Any)   UPCOMING SURGERY   Valve surgery No   MEDICAL HISTORY   Vital Signs /63   Pulse 77   Temp 97.2 °F (36.2 °C) (Temporal)   Resp 15   Ht 6' (1.829 m)   Wt 214 lb 8.1 oz (97.3 kg)   SpO2 98%   BMI 29.09 kg/m²    Allergies Reviewed in EMR   Medications Reviewed in EMR   Medical History Reviewed in EMR   Surgical History Reviewed in EMR   PRE-SEDATION   Exam I have assessed the patient and reviewed the H&P on the chart.    Hx Anesthesia Issue None   Mod Mallampati II   ASA Class Class 2 - A normal healthy patient with mild systemic disease   RICKY SCALE   Activity 2 - Able to move 4 extrem on command   Respiration 2 - Able to breath deeply and cough freely   Circulation 2 - BP +/- 20mmHg of normal   Consciousness 2 - Fully awake   Oxygen Saturation 2 - Able to maintain oxygen sat >92% on room air   SEDATION/ANESTHESIA PLAN   Goal Guard patient safety and welfare. Minimize physical discomfort and pain. Minimize negative psychological responses to treatment by providing sedation and analgesia and maximize the potential amnesia. Patient to meet pre-procedure discharge plan.    Medications Midazolam intravenously and fentanyl intravenously   Appropriate Candidate Yes   Post Procedure Return to same level of care

## 2023-03-10 NOTE — PROGRESS NOTES
Patient admitted 03/09/2023 @ 2130 from [facility] ([facility]) for chest pain x 2 months/abnormal stress test outpatient. Hep gtt running on arrival. Anti XA scheduled for 0230 for titration. NPO since midnight for cardiac cath tomorrow. A&O and independent. Urinal at bedside. Patient has CGM with ability to administer insulin. Educated patient NOT to give himself insulin and let nursing staff do glucose checks and insulin coverage as needed. Patient uses BIPAP HS @ home. Could not tolerate our machine and wore for ~4 hours prior to taking it off. RT aware.

## 2023-03-10 NOTE — PROGRESS NOTES
Physical/Occupational Therapy  Sue Albright    Attempted to see pt for PT/OT evaluation. Chart reviewed, discussed with nursing. Patient on bedrest due to heparin drip and pending cardiac cath. Will hold therapy at this time and will follow up with pt tomorrow as medically appropriate.  Thanks, Robert Pickett, DPT [id number]

## 2023-03-11 NOTE — DISCHARGE INSTRUCTIONS
Radial Angiogram      Care of your puncture site:  Remove clear bandage 24 hours after the procedure. May shower in 24 hours  Inspect the site daily and gently clean using soap and water. Dry thoroughly and apply a Band-Aid. Normal Observations:  Soreness or tenderness which may last one week. Mild oozing from the incision site. Possible bruising that could last 2 weeks. Activity:  You may resume driving 24 hours following the procedure. You may resume normal activity in 3 days or after the wound heals. Avoid lifting more than 10 pounds for 3 days with affected arm. Nutrition:  Regular diet or general.  Drink at least 8 to 10 glasses of decaffeinated, non-alcoholic fluid for the next 24 hours to flush the x-ray dye used for your angiogram out of your body. Call your doctor immediately if your condition worsens, for any other concerns, for a follow-up appointment or if you experience any of the following:  Significant bleeding that does not stop after 10 minutes of applying firm pressure on the puncture site. Increased swelling of the wrist.  Unusual pain, numbness, or tingling of the wrist/arm. Any signs of infection such as: redness, yellow drainage at the site, swelling or pain. Other Instructions:  Hold Metformin or Metformin containing drugs for 48 hours after procedure.

## 2023-03-11 NOTE — PLAN OF CARE
Problem: Discharge Planning  Goal: Discharge to home or other facility with appropriate resources  Outcome: Completed     Problem: Respiratory - Adult  Goal: Achieves optimal ventilation and oxygenation  Outcome: Completed     Problem: Cardiovascular - Adult  Goal: Maintains optimal cardiac output and hemodynamic stability  Outcome: Completed     Problem: Metabolic/Fluid and Electrolytes - Adult  Goal: Electrolytes maintained within normal limits  Outcome: Completed     Problem: Hematologic - Adult  Goal: Maintains hematologic stability  Outcome: Completed     Problem: Pain  Goal: Verbalizes/displays adequate comfort level or baseline comfort level  Outcome: Completed

## 2023-03-11 NOTE — PROGRESS NOTES
Data- discharge order received, pt verbalized agreement to discharge, disposition to previous residence, no needs for HHC/DME. Action- discharge instructions prepared and given to Tony Sexton, pt verbalized understanding. Medication information packet given r/t NEW and/or CHANGED prescriptions emphasizing name/purpose/side effects, pt verbalized understanding. Discharge instruction summary: Diet- General, Activity- as tolerated with [facility] right radial restrictions-return to work on Monday, [facility] Physician as follows: Mathieu Eckert [phone] f/u appointment March 15 already made, prescription medications filled at pharmacy of choice. Inpatient surgical procedure precautions reviewed: s/p [facility] wound care and restrictions reviewed. 1. WEIGHT: Admit Weight: 216 lb (98 kg) (03/09/23 1700)        Today  Weight: 214 lb 8.1 oz (97.3 kg) (03/10/23 0258)       2. O2 SAT.: SpO2: 99 % (03/10/23 1515)    Response- Pt belongings gathered, IV removed. Disposition is home (no HHC/DME needs), transported independently to [facility], no complications.

## 2023-03-13 SDOH — ECONOMIC STABILITY: FOOD INSECURITY: WITHIN THE PAST 12 MONTHS, YOU WORRIED THAT YOUR FOOD WOULD RUN OUT BEFORE YOU GOT MONEY TO BUY MORE.: NEVER TRUE

## 2023-03-13 SDOH — ECONOMIC STABILITY: HOUSING INSECURITY
IN THE LAST 12 MONTHS, WAS THERE A TIME WHEN YOU DID NOT HAVE A STEADY PLACE TO SLEEP OR SLEPT IN A SHELTER (INCLUDING NOW)?: NO

## 2023-03-13 SDOH — ECONOMIC STABILITY: TRANSPORTATION INSECURITY
IN THE PAST 12 MONTHS, HAS LACK OF TRANSPORTATION KEPT YOU FROM MEETINGS, WORK, OR FROM GETTING THINGS NEEDED FOR DAILY LIVING?: NO

## 2023-03-13 SDOH — ECONOMIC STABILITY: FOOD INSECURITY: WITHIN THE PAST 12 MONTHS, THE FOOD YOU BOUGHT JUST DIDN'T LAST AND YOU DIDN'T HAVE MONEY TO GET MORE.: NEVER TRUE

## 2023-03-13 SDOH — ECONOMIC STABILITY: INCOME INSECURITY: HOW HARD IS IT FOR YOU TO PAY FOR THE VERY BASICS LIKE FOOD, HOUSING, MEDICAL CARE, AND HEATING?: NOT HARD AT ALL

## 2023-03-13 NOTE — PROGRESS NOTES
-Post-Discharge Transitional Care Management Services or Hospital Follow Up-    Patient: Naye Ashford     YOB: 1965    Date of Office Visit: 3/15/2023    Hospital name:   [x] [facility]  [] [address]  [] [address]  [] [address]  [] [address]  [] [address]  [] [address]  [] [address]  [] [facility]  [] Other -     Date of Hospital Admission: 3/9/23    Date of Hospital Discharge: 3/10/23    Readmission Risk Score (high >=14%.  Medium >=10%):Readmission Risk Score: 4.8    Care management risk score Rising risk (score 2-5) and Complex Care (Scores >=6): No Risk Score On File     Non face to face  following discharge, date last encounter closed (first attempt may have been earlier): *No documented post hospital discharge outreach found in the last 14 days *No documented post hospital discharge outreach found in the last 14 days    Call initiated 2 business days of discharge: *No response recorded in the last 14 days     Patient Active Problem List   Diagnosis    Mixed anxiety and depressive disorder    Obstructive sleep apnea- mild    Gout    Elevated cholesterol    Elevated fasting blood sugar    Family history of colon cancer    Type 2 diabetes mellitus without complication, without long-term current use of insulin (HCC)    Non compliance w medication regimen    Carpal tunnel syndrome of left wrist    Cervical radiculopathy    Neck pain    Osteoarthritis of spine with radiculopathy, cervical region    Unstable angina (HCC)    Essential hypertension       Allergies   Allergen Reactions    Indomethacin      diarrhea       Medications listed as ordered at the time of discharge from hospital:     Medication List            Accurate as of March 15, 2023  6:18 PM. If you have any questions, ask your nurse or doctor.                START taking these medications      nitroGLYCERIN 0.3 MG SL tablet  Commonly known as: Nitrostat  Place 1 tablet under the tongue every 5 minutes as needed for Chest pain up to max of 3 total doses. If no relief after 1 dose, call 911.  Started by: Armando Blas MD     rosuvastatin 40 MG tablet  Commonly known as: CRESTOR  Take 1 tablet by mouth at bedtime  Started by: Armando Blas MD            CHANGE how you take these medications      * Dexcom G6 Sensor Misc  What changed: Another medication with the same name was changed. Make sure you understand how and when to take each.  Changed by: Armando Blas MD     * Dexcom G7 Sensor Misc  1 each by Does not apply route every 14 days  What changed:   how much to take  how to take this  when to take this  additional instructions  Changed by: Armando Blas MD     Dexcom G6 Transmitter Misc  What changed: Another medication with the same name was removed. Continue taking this medication, and follow the directions you see here.  Changed by: Armando Blas MD           * This list has 2 medication(s) that are the same as other medications prescribed for you. Read the directions carefully, and ask your doctor or other care provider to review them with you.                CONTINUE taking these medications      aspirin 81 MG chewable tablet  Take 1 tablet by mouth daily     CeQur Simplicity 2U Marija  Generic drug: Injection Device for Insulin     insulin aspart 100 UNIT/ML injection vial  Commonly known as: NOVOLOG     Insulin Pen Needle 32G X 5 MM Misc  1 each by Does not apply route daily     Trulicity 4.5 MG/0.5ML Sopn  Generic drug: Dulaglutide  Inject 4.5 mg into the skin once a week            STOP taking these medications      atorvastatin 80 MG tablet  Commonly known as: LIPITOR  Stopped by: Armando Blas MD               Where to Get Your Medications        These medications were sent to  [facility] [address] [facility], [address] [facility]  [address] [facility], [address]      Phone: [phone]   nitroGLYCERIN 0.3 MG SL tablet       These medications were sent to [address]  [place] [address]      Phone: [phone]   Dexcom G7 Sensor Misc  rosuvastatin 40 MG tablet           Medications marked \"taking\" at this time:  Outpatient Medications Marked as Taking for the 3/15/23 encounter (Office Visit) with Carlos Williamson MD   Medication Sig Dispense Refill    Continuous Blood Gluc Sensor (DEXCOM G7 SENSOR) MISC 1 each by Does not apply route every 14 days 6 each 11    nitroGLYCERIN (NITROSTAT) 0.3 MG SL tablet Place 1 tablet under the tongue every 5 minutes as needed for Chest pain up to max of 3 total doses. If no relief after 1 dose, call 911. 3 tablet 5    rosuvastatin (CRESTOR) 40 MG tablet Take 1 tablet by mouth at bedtime 90 tablet 3    aspirin 81 MG chewable tablet Take 1 tablet by mouth daily 30 tablet 3    insulin aspart (NOVOLOG) 100 UNIT/ML injection vial Inject 15 Units into the skin 3 times daily (before meals)      CEQUR SIMPLICITY 2U ANIVAL       Continuous Blood Gluc Sensor (DEXCOM G6 SENSOR) MISC by Other route      Continuous Blood Gluc Transmit (DEXCOM G6 TRANSMITTER) MISC by Other route      Dulaglutide (TRULICITY) 4.5 LW/9.1NV SOPN Inject 4.5 mg into the skin once a week 4 pen 2        Medications patient taking as of now reconciled against medications ordered at time of hospital discharge: Yes    Chief Complaint   Patient presents with    2 Week Follow-Up    Diabetes     Patient would like to change to dexcom 7       HPI    Inpatient course: discharge summary reviewed - refer to chart. Patient reported of 2 month history of persistent chest pressure/pain.   He was admitted after having an abnormal stress test had a [facility] with showed no signs of CAD in any blood vessels. LVEF > 55%. He had labs done which was remarkable for A1c of 8.7%. BNP and troponin (x2) were WNL. Interval history/current status:     Patient has overall been doing well since his hospitalization. He has had no chest pain, palpitations, dyspnea, wheezing, etc.    Review of Systems   Constitutional:  Negative for activity change, appetite change, fatigue, fever and unexpected weight change. HENT:  Negative for congestion, rhinorrhea, sinus pressure and trouble swallowing. Respiratory:  Negative for cough, chest tightness, shortness of breath and wheezing. Cardiovascular:  Negative for chest pain, palpitations and leg swelling. Gastrointestinal:  Negative for abdominal distention, abdominal pain, blood in stool, constipation, diarrhea, nausea and vomiting. Genitourinary:  Negative for dysuria, frequency and hematuria. Musculoskeletal:  Negative for arthralgias and back pain. Skin:  Negative for rash. Neurological:  Negative for dizziness, weakness, light-headedness, numbness and headaches. Psychiatric/Behavioral:  Negative for agitation, decreased concentration, dysphoric mood, sleep disturbance and suicidal ideas. The patient is not nervous/anxious. Wt Readings from Last 3 Encounters:   03/15/23 221 lb (100.2 kg)   03/10/23 214 lb 8.1 oz (97.3 kg)   03/01/23 216 lb (98 kg)     Temp Readings from Last 3 Encounters:   03/15/23 97.2 °F (36.2 °C)   03/10/23 98.6 °F (37 °C)   03/01/23 97.5 °F (36.4 °C)     BP Readings from Last 3 Encounters:   03/15/23 126/84   03/10/23 116/76   03/01/23 122/88     Pulse Readings from Last 3 Encounters:   03/15/23 96   03/10/23 89   03/01/23 (!) 122     Body mass index is 29.97 kg/m². Physical Exam  Vitals reviewed. Constitutional:       General: He is awake. He is not in acute distress. Appearance: He is not ill-appearing or diaphoretic.    HENT:      Head: Normocephalic and atraumatic. No abrasion or masses. Hair is normal.      Right Ear: External ear normal.      Left Ear: External ear normal.      Nose: Nose normal.   Eyes:      General: Lids are normal. Gaze aligned appropriately. No scleral icterus. Right eye: No discharge. Left eye: No discharge. Extraocular Movements: Extraocular movements intact. Conjunctiva/sclera: Conjunctivae normal.   Neck:      Trachea: Phonation normal.   Cardiovascular:      Rate and Rhythm: Normal rate and regular rhythm. Pulmonary:      Effort: Pulmonary effort is normal. No respiratory distress. Breath sounds: No wheezing, rhonchi or rales. Abdominal:      General: Abdomen is flat. There is no distension. Palpations: Abdomen is soft. Musculoskeletal:         General: No deformity. Normal range of motion. Cervical back: Normal range of motion. No erythema. Right lower leg: No edema. Left lower leg: No edema. Skin:     Coloration: Skin is not cyanotic, jaundiced or pale. Findings: No abrasion, abscess, bruising, ecchymosis, erythema, signs of injury, laceration, lesion, petechiae, rash or wound. Neurological:      General: No focal deficit present. Mental Status: He is alert. Mental status is at baseline. GCS: GCS eye subscore is 4. GCS verbal subscore is 5. GCS motor subscore is 6. Cranial Nerves: No cranial nerve deficit, dysarthria or facial asymmetry. Motor: No weakness, tremor, atrophy or seizure activity. Coordination: Coordination normal.      Gait: Gait is intact. Psychiatric:         Attention and Perception: Attention and perception normal.         Mood and Affect: Mood and affect normal.         Speech: Speech normal.         Behavior: Behavior normal. Behavior is cooperative. Thought Content: Thought content normal.       Assessment/Plan:    ICD-10-CM    1.  Hospital discharge follow-up  Z09 MI DISCHRG MEDS RECONCILED W/CURRENT MED LIST      2. Unstable angina (HCC)  I20.0 nitroGLYCERIN (NITROSTAT) 0.3 MG SL tablet      3. Hemoglobin A1C greater than 9%, indicating poor diabetic control  R73.09 LA DISCHRG MEDS RECONCILED W/CURRENT MED LIST      4. Type 2 diabetes mellitus without complication, without long-term current use of insulin (HCC)  E11.9 POCT Glucose     Continuous Blood Gluc Sensor (DEXCOM G7 SENSOR) MISC     LA DISCHRG MEDS RECONCILED W/CURRENT MED LIST     rosuvastatin (CRESTOR) 40 MG tablet      5. Chronic gout of foot, unspecified cause, unspecified laterality  M1A.0790       6. Colon cancer screening  Z12.11 Fecal DNA Colorectal cancer screening (Cologuard)      7. Family history of colon cancer in father  Z80.0         Clinical status:  [x] Resolved/back to baseline  [] Improved, but still has mild residual issues  [] Still has significant clinical issues    Overview:    -Continue Aspirin 81 mg qHS. Will prescribe NTG in case patient has angina. Will start on Rosuvastatin 40 mg to help prevent atherosclerosis. -Diabetes is uncontrolled. Patient was advised to continue seeing endocrinologist. Will add SGLT-2 inhibitor therapy Francia Yates) to his current regiment. Patient is on insulin type pump right now and also taking Trulicity 4.5 mg once weekly    -Other: discussed about colon CA screening. He has a [facility] of colon CA (father). He was advised to have a colonoscopy done. He opted for Cologuard instead. He was informed that if his Cologuard turned out to be positive, a colonoscopy would be recommended. Medical Decision Making: moderate complexity    General information on medications:  -When it comes to medications, whether with starting or adding a new medication or increasing the dose of a current medication, the benefits and risks have to always be considered and weighed over, especially if one is taking other medications as well.    -There are no medications that have no side effects and that there is always a risk involved with taking a medication.    -If a side effect were to occur with starting a new medication or with increasing the dose of a current medication that either the medication can be totally discontinued altogether or simply decrease the dose of it and if this would be the case a follow-up appointment would be deemed necessary.    -The drug allergy list will then be updated with the corresponding side effect(s) if it's deemed to be a true 'drug allergy'. -The most common adverse effects of medication(s) were addressed at today's visit.    -Lastly, the coverage status of a medication may vary from insurance to insurance and the only way to verify if the medication is covered is to send an actual prescription in.    -The drug formulary of each insurance changes without any warning or notification to the healthcare provider let alone the pharmacy.  -The cost of medications vary from insurance to insurance and the cost is always subject to change just like the drug formulary. Follow-up: Return if symptoms worsen or fail to improve, for annual physical..     Patient was informed that if his or her symptoms worsen to follow up with me sooner or go to the nearest ER if the symptoms are very significant and warrant higher level of care. Regarding my note:  -This note was composed (by me only and not with assistance via a scribe) to the best of my knowledge and recollection of the encounter with the patient using one of my own customized note templates utilizing a combination of typing and dictating with the [address] [place] [address] speech recognition software. As a result, the note may possibly contain various errors (e.g. spelling, grammar, and non-sensible words/phrases/statements) despite reviewing the note prior to signing it for completion.       Time spent includes some or all of the following, both face-to-face time and non face-to-face time, but is not limited to:  [x] Preparing to see the patient by reviewing medical records available (notes, labs, imaging, etc.) prior to seeing the patient. [x] Obtaining and/or reviewing the history from the patient. [x] Performing a medically appropriate examination. [x] Ordering of relevant lab work, medications, referrals, or procedures. [x] Discussing patient's medical issues and formulating an assessment and plan. [x] Reviewing plan of care with patient. Answering any questions or concerns. [x] Documentation within the electronic health record (EHR)  [] Reviewing records of history relevant to patient's issues after seeing the patient. [] Discussion or coordination of care with other health care professionals  [x] Other: length office visit - 30 minutes.  -I spent a significant amount of time discussing various issues as noted above and also with formulating a treatment plan for each specific issue. Patient was given the opportunity to ask me any questions and address any concerns/issues. I also reviewed lab work (if available) as well as prior notes from PCP and/or other specialists if available. Armando Vernon M.D.   [address] [place] [address]    Electronically signed by Preeti Head MD M.D. on 3/15/2023 at 6:18 PM.

## 2023-03-15 ENCOUNTER — OFFICE VISIT (OUTPATIENT)
Dept: FAMILY MEDICINE CLINIC | Age: 58
End: 2023-03-15

## 2023-03-15 VITALS
DIASTOLIC BLOOD PRESSURE: 84 MMHG | TEMPERATURE: 97.2 F | WEIGHT: 221 LBS | SYSTOLIC BLOOD PRESSURE: 126 MMHG | OXYGEN SATURATION: 98 % | HEART RATE: 96 BPM | BODY MASS INDEX: 29.97 KG/M2

## 2023-03-15 DIAGNOSIS — Z12.11 COLON CANCER SCREENING: ICD-10-CM

## 2023-03-15 DIAGNOSIS — E11.9 TYPE 2 DIABETES MELLITUS WITHOUT COMPLICATION, WITHOUT LONG-TERM CURRENT USE OF INSULIN (HCC): ICD-10-CM

## 2023-03-15 DIAGNOSIS — M1A.0790 CHRONIC GOUT OF FOOT, UNSPECIFIED CAUSE, UNSPECIFIED LATERALITY: ICD-10-CM

## 2023-03-15 DIAGNOSIS — Z09 HOSPITAL DISCHARGE FOLLOW-UP: Primary | ICD-10-CM

## 2023-03-15 DIAGNOSIS — Z80.0 FAMILY HISTORY OF COLON CANCER IN FATHER: ICD-10-CM

## 2023-03-15 DIAGNOSIS — I20.0 UNSTABLE ANGINA (HCC): ICD-10-CM

## 2023-03-15 DIAGNOSIS — R73.09 HEMOGLOBIN A1C GREATER THAN 9%, INDICATING POOR DIABETIC CONTROL: ICD-10-CM

## 2023-03-15 LAB
CHP ED QC CHECK: NORMAL
GLUCOSE BLD-MCNC: 278 MG/DL

## 2023-03-15 RX ORDER — ROSUVASTATIN CALCIUM 40 MG/1
40 TABLET, COATED ORAL NIGHTLY
Qty: 90 TABLET | Refills: 3 | Status: SHIPPED | OUTPATIENT
Start: 2023-03-15

## 2023-03-15 RX ORDER — NITROGLYCERIN 0.3 MG/1
0.3 TABLET SUBLINGUAL EVERY 5 MIN PRN
Qty: 3 TABLET | Refills: 5 | Status: SHIPPED | OUTPATIENT
Start: 2023-03-15

## 2023-03-15 RX ORDER — BLOOD-GLUCOSE,RECEIVER,CONT
1 EACH MISCELLANEOUS
Qty: 1 EACH | Refills: 0 | Status: CANCELLED | OUTPATIENT
Start: 2023-03-15

## 2023-03-15 RX ORDER — BLOOD-GLUCOSE SENSOR
1 EACH MISCELLANEOUS
Qty: 6 EACH | Refills: 11 | Status: SHIPPED | OUTPATIENT
Start: 2023-03-15

## 2023-03-15 ASSESSMENT — ENCOUNTER SYMPTOMS
SINUS PRESSURE: 0
BLOOD IN STOOL: 0
BACK PAIN: 0
RHINORRHEA: 0
ABDOMINAL PAIN: 0
COUGH: 0
CHEST TIGHTNESS: 0
SHORTNESS OF BREATH: 0
NAUSEA: 0
CONSTIPATION: 0
VOMITING: 0
WHEEZING: 0
ABDOMINAL DISTENTION: 0
TROUBLE SWALLOWING: 0
DIARRHEA: 0

## 2023-10-02 RX ORDER — PROCHLORPERAZINE 25 MG/1
SUPPOSITORY RECTAL
Refills: 3 | OUTPATIENT
Start: 2023-10-02

## 2023-10-03 ENCOUNTER — TELEPHONE (OUTPATIENT)
Dept: ADMINISTRATIVE | Age: 58
End: 2023-10-03

## 2023-10-03 DIAGNOSIS — E11.9 TYPE 2 DIABETES MELLITUS WITHOUT COMPLICATION, WITHOUT LONG-TERM CURRENT USE OF INSULIN (HCC): ICD-10-CM

## 2023-10-03 RX ORDER — ACYCLOVIR 400 MG/1
1 TABLET ORAL
Qty: 6 EACH | Refills: 11 | Status: SHIPPED | OUTPATIENT
Start: 2023-10-03

## 2023-10-03 NOTE — TELEPHONE ENCOUNTER
Submitted PA for [employer]  Via CMM Key: [facility] STATUS: APPROVED  Coverage Start Date:09/03/2023  Coverage End Date:10/02/2024    If this requires a response please respond to the pool ( [id number] [phone]). Thank you please advise patient.

## 2023-10-11 ENCOUNTER — TELEPHONE (OUTPATIENT)
Dept: FAMILY MEDICINE CLINIC | Age: 58
End: 2023-10-11

## 2023-10-11 DIAGNOSIS — E11.9 TYPE 2 DIABETES MELLITUS WITHOUT COMPLICATION, WITHOUT LONG-TERM CURRENT USE OF INSULIN (HCC): ICD-10-CM

## 2023-10-11 RX ORDER — ACYCLOVIR 400 MG/1
TABLET ORAL
Qty: 3 EACH | Refills: 11 | Status: SHIPPED | OUTPATIENT
Start: 2023-10-11

## 2023-10-11 NOTE — TELEPHONE ENCOUNTER
Please reach out to this pt to notify him that out of courtesy, I had refilled the request for his CGM - Dexcom G7 and had sent that rx to [facility]. Patient is an uncontrolled diabetic and his last A1c was 9.7 (3/10/2023). He's long overdue for an appointment!!! Armando CORDON  [address]

## 2023-10-11 NOTE — TELEPHONE ENCOUNTER
Please update rx to say change every 14 days and resend. ..  Rx currently states \"Si each by Does not apply route every 14 days\"

## 2023-10-11 NOTE — TELEPHONE ENCOUNTER
[facility] called stating they need a new set of directions for the Dexcom G7. Phone got disconnected. Please advise!

## 2023-12-29 LAB
ESTIMATED AVERAGE GLUCOSE: NORMAL
HBA1C MFR BLD: 14.3 %

## 2024-01-01 ASSESSMENT — ENCOUNTER SYMPTOMS
CONSTIPATION: 0
SHORTNESS OF BREATH: 0
ABDOMINAL PAIN: 0
VOMITING: 0
CHEST TIGHTNESS: 0
WHEEZING: 0
NAUSEA: 0
DIARRHEA: 0
COUGH: 0

## 2024-01-02 ENCOUNTER — OFFICE VISIT (OUTPATIENT)
Dept: FAMILY MEDICINE CLINIC | Age: 59
End: 2024-01-02
Payer: COMMERCIAL

## 2024-01-02 VITALS
SYSTOLIC BLOOD PRESSURE: 120 MMHG | WEIGHT: 221 LBS | OXYGEN SATURATION: 97 % | HEART RATE: 113 BPM | DIASTOLIC BLOOD PRESSURE: 70 MMHG | BODY MASS INDEX: 29.97 KG/M2

## 2024-01-02 DIAGNOSIS — E11.65 UNCONTROLLED TYPE 2 DIABETES MELLITUS WITH HYPERGLYCEMIA (HCC): Primary | ICD-10-CM

## 2024-01-02 DIAGNOSIS — E78.2 MIXED HYPERLIPIDEMIA: ICD-10-CM

## 2024-01-02 DIAGNOSIS — F43.20 ADULT SITUATIONAL STRESS DISORDER: ICD-10-CM

## 2024-01-02 PROCEDURE — 3074F SYST BP LT 130 MM HG: CPT | Performed by: CLINICAL NURSE SPECIALIST

## 2024-01-02 PROCEDURE — 99214 OFFICE O/P EST MOD 30 MIN: CPT | Performed by: CLINICAL NURSE SPECIALIST

## 2024-01-02 PROCEDURE — 3078F DIAST BP <80 MM HG: CPT | Performed by: CLINICAL NURSE SPECIALIST

## 2024-01-02 RX ORDER — INSULIN LISPRO 100 [IU]/ML
INJECTION, SOLUTION INTRAVENOUS; SUBCUTANEOUS
COMMUNITY
Start: 2023-10-02

## 2024-01-02 RX ORDER — PIOGLITAZONEHYDROCHLORIDE 15 MG/1
15 TABLET ORAL DAILY
COMMUNITY
Start: 2024-01-02 | End: 2025-01-01

## 2024-01-02 RX ORDER — ROSUVASTATIN CALCIUM 40 MG/1
40 TABLET, COATED ORAL NIGHTLY
Qty: 30 TABLET | Refills: 0 | Status: SHIPPED | OUTPATIENT
Start: 2024-01-02 | End: 2024-01-02

## 2024-01-02 RX ORDER — ROSUVASTATIN CALCIUM 40 MG/1
40 TABLET, COATED ORAL NIGHTLY
Qty: 30 TABLET | Refills: 1 | Status: SHIPPED | OUTPATIENT
Start: 2024-01-02

## 2024-01-02 ASSESSMENT — PATIENT HEALTH QUESTIONNAIRE - PHQ9
2. FEELING DOWN, DEPRESSED OR HOPELESS: 0
1. LITTLE INTEREST OR PLEASURE IN DOING THINGS: NOT AT ALL
1. LITTLE INTEREST OR PLEASURE IN DOING THINGS: 0
10. IF YOU CHECKED OFF ANY PROBLEMS, HOW DIFFICULT HAVE THESE PROBLEMS MADE IT FOR YOU TO DO YOUR WORK, TAKE CARE OF THINGS AT HOME, OR GET ALONG WITH OTHER PEOPLE: SOMEWHAT DIFFICULT
4. FEELING TIRED OR HAVING LITTLE ENERGY: NEARLY EVERY DAY
4. FEELING TIRED OR HAVING LITTLE ENERGY: 3
2. FEELING DOWN, DEPRESSED OR HOPELESS: NOT AT ALL
SUM OF ALL RESPONSES TO PHQ QUESTIONS 1-9: 11
SUM OF ALL RESPONSES TO PHQ9 QUESTIONS 1 & 2: 0
5. POOR APPETITE OR OVEREATING: NEARLY EVERY DAY
3. TROUBLE FALLING OR STAYING ASLEEP: NEARLY EVERY DAY
9. THOUGHTS THAT YOU WOULD BE BETTER OFF DEAD, OR OF HURTING YOURSELF: NOT AT ALL
7. TROUBLE CONCENTRATING ON THINGS, SUCH AS READING THE NEWSPAPER OR WATCHING TELEVISION: SEVERAL DAYS
SUM OF ALL RESPONSES TO PHQ QUESTIONS 1-9: 11
6. FEELING BAD ABOUT YOURSELF - OR THAT YOU ARE A FAILURE OR HAVE LET YOURSELF OR YOUR FAMILY DOWN: 0
SUM OF ALL RESPONSES TO PHQ QUESTIONS 1-9: 11
8. MOVING OR SPEAKING SO SLOWLY THAT OTHER PEOPLE COULD HAVE NOTICED. OR THE OPPOSITE - BEING SO FIDGETY OR RESTLESS THAT YOU HAVE BEEN MOVING AROUND A LOT MORE THAN USUAL: SEVERAL DAYS
10. IF YOU CHECKED OFF ANY PROBLEMS, HOW DIFFICULT HAVE THESE PROBLEMS MADE IT FOR YOU TO DO YOUR WORK, TAKE CARE OF THINGS AT HOME, OR GET ALONG WITH OTHER PEOPLE: 1
5. POOR APPETITE OR OVEREATING: 3
3. TROUBLE FALLING OR STAYING ASLEEP: 3
SUM OF ALL RESPONSES TO PHQ QUESTIONS 1-9: 11
SUM OF ALL RESPONSES TO PHQ QUESTIONS 1-9: 11
8. MOVING OR SPEAKING SO SLOWLY THAT OTHER PEOPLE COULD HAVE NOTICED. OR THE OPPOSITE, BEING SO FIGETY OR RESTLESS THAT YOU HAVE BEEN MOVING AROUND A LOT MORE THAN USUAL: 1
6. FEELING BAD ABOUT YOURSELF - OR THAT YOU ARE A FAILURE OR HAVE LET YOURSELF OR YOUR FAMILY DOWN: NOT AT ALL
9. THOUGHTS THAT YOU WOULD BE BETTER OFF DEAD, OR OF HURTING YOURSELF: 0
7. TROUBLE CONCENTRATING ON THINGS, SUCH AS READING THE NEWSPAPER OR WATCHING TELEVISION: 1

## 2024-01-02 NOTE — PATIENT INSTRUCTIONS
Fasting labs ordered    Medications refilled     Reviewed low cholesterol diet     Reviewed low cholesterol diet    Discussed stress management techniques    Trial of Zoloft 50 mg once daily, can start with half a tablet for a week     Instructed patient to contact office or on-call physician promptly should condition worsen or any new symptoms appear and provided on-call telephone numbers. IF THE PATIENT HAS ANY SUICIDAL OR HOMICIDAL IDEATIONS, CALL THE OFFICE, DISCUSS WITH A SUPPORT MEMBER, OR GO TO THE ER IMMEDIATELY. Patient was agreeable with this plan.       Follow up with PCP in 1-2 months, sooner if symptoms worsen or persist

## 2024-01-02 NOTE — PROGRESS NOTES
SUBJECTIVE:    Patient ID:  Brannon Albright is a 58 y.o. male      Patient is here for a medication check for hyperlipidemia and diabetes.  He is doing well on current regimen and has no further concerns.      He has also seen the endocrinologist today and A1c was 14.3 on 12/29/23.  States he will be starting on an insulin pump.    He is working with endodontist for 3 tooth abscess and has an appointment with oral surgeon on 2/29/24.      Discussed the needed to complete colon caner screening, Cologuard     States he is under a great deal of stress, recently , high stress job and health issues.  Discuss stress management techniques, information given.      States wife is going out of state for bariatric surgery.      Anxiety: Patient complains of evaluation of anxiety disorder and stress.  He has the following anxiety symptoms: difficulty concentrating, feelings of losing control, irritable, psychomotor agitation, racing thoughts. Onset of symptoms was approximately several years ago, gradually worsening since that time. He denies current suicidal and homicidal ideation. Family history significant for anxiety and depression.Possible organic causes contributing are: endocrine/metabolic, uncontrolled diabetes. Risk factors: negative life event health issue and stressful job and previous episode of depression Previous treatment includes Celexa, Lexapro, Paxil, Prozac, Wellbutrin, and Zoloft and individual therapy.  He complains of the following side effects from the treatment: dry mouth and yes.  He is requesting to restart Zoloft 50 mg once daily.      He is considering starting FMLA for stress issues.    Advised to discussed with PCP in further detail and re-establish with psychiatrist.          Hyperlipidemia  This is a chronic problem. The current episode started more than 1 year ago. The problem is controlled. Exacerbating diseases include diabetes. Pertinent negatives include no chest pain, focal sensory

## 2024-01-05 ENCOUNTER — TELEPHONE (OUTPATIENT)
Dept: FAMILY MEDICINE CLINIC | Age: 59
End: 2024-01-05

## 2024-01-05 DIAGNOSIS — E11.9 TYPE 2 DIABETES MELLITUS WITHOUT COMPLICATION, WITHOUT LONG-TERM CURRENT USE OF INSULIN (HCC): ICD-10-CM

## 2024-01-05 NOTE — TELEPHONE ENCOUNTER
Fax received from [employer] requesting clarification for Continuous Blood Gluc Sensor (DEXCOM G7 SENSOR) [facility].    Pharmacy states that the qty originally prescribed is less than allowed on the patient's prescription plan. Increasing the days supply may allow the patient to take full advantage of their plan benefits. Please advise.

## 2024-01-06 RX ORDER — ACYCLOVIR 400 MG/1
TABLET ORAL
Qty: 9 EACH | Refills: 11 | Status: SHIPPED | OUTPATIENT
Start: 2024-01-06

## 2024-01-21 NOTE — PROGRESS NOTES
requires a major lifestyle change.  Extensive counseling was given to the patient, who was informed of the microvascular and macrovascular complications of diabetes: increased risk of CAD, MI, CVA, CKD/ESRD (leading to dialysis), diabetic retinopathy, diabetic gastroparesis, diabetic neuropathy, chronic wound infections (eg osteomyelitis), etc.      Patient was informed that diabetics need to be seen every 3 months for routine A1c blood testing and that the goal of A1c is under 7.0 (and under 6.5 for more aggressive treatment) for most patients and under 8.5 for the elderly.      Patient was counseled also to take the medications as prescribed and to check glucose as directed, especially if they're symptomatic (malaise, weak, fatigue, clammy, dizzy, etc.) and to keep food/beverage with him at all times in case his glucose is low.  Patient was also advised, especially in the setting of severe neuropathy with numbness to never walk around barefoot.    Informed patient of benefits of being on GLP1 & SGLT2 therapy that go beyond glycemic control - low risk of hypoglycemia, weight loss, decreased risk of cardiovascular complications and chronic kidney disease (CKD), etc.    Type 2 diabetes mellitus with peripheral neuropathy (HCC)  Comments:  DM control emphasized. Pt plans to speak to endocrinology about management.    Mixed anxiety and depressive disorder  Comments:  Continue Sertraline 50 mg for now. He's only been on it for 2 weeks. We discussed SSRI may take 4-6 weeks to see full effect.      I reviewed the plan of care with the patient. Patient acknowledged understanding and agreed with plan of care overall.    There are no discontinued medications.     General information on medications:  -When it comes to medications, whether with starting or adding a new medication or increasing the dose of a current medication, the benefits and risks have to always be considered and weighed over, especially if one is taking

## 2024-01-24 ENCOUNTER — OFFICE VISIT (OUTPATIENT)
Dept: FAMILY MEDICINE CLINIC | Age: 59
End: 2024-01-24
Payer: COMMERCIAL

## 2024-01-24 VITALS
TEMPERATURE: 97.2 F | OXYGEN SATURATION: 97 % | SYSTOLIC BLOOD PRESSURE: 128 MMHG | HEART RATE: 98 BPM | BODY MASS INDEX: 27.94 KG/M2 | DIASTOLIC BLOOD PRESSURE: 82 MMHG | WEIGHT: 206 LBS

## 2024-01-24 DIAGNOSIS — Z79.4 INSULIN DEPENDENT TYPE 2 DIABETES MELLITUS (HCC): ICD-10-CM

## 2024-01-24 DIAGNOSIS — E11.42 TYPE 2 DIABETES MELLITUS WITH PERIPHERAL NEUROPATHY (HCC): ICD-10-CM

## 2024-01-24 DIAGNOSIS — E11.9 INSULIN DEPENDENT TYPE 2 DIABETES MELLITUS (HCC): ICD-10-CM

## 2024-01-24 DIAGNOSIS — F41.8 MIXED ANXIETY AND DEPRESSIVE DISORDER: ICD-10-CM

## 2024-01-24 DIAGNOSIS — Z02.89 ENCOUNTER FOR COMPLETION OF FORM WITH PATIENT: Primary | ICD-10-CM

## 2024-01-24 PROCEDURE — 3079F DIAST BP 80-89 MM HG: CPT | Performed by: FAMILY MEDICINE

## 2024-01-24 PROCEDURE — 3074F SYST BP LT 130 MM HG: CPT | Performed by: FAMILY MEDICINE

## 2024-01-24 PROCEDURE — 99214 OFFICE O/P EST MOD 30 MIN: CPT | Performed by: FAMILY MEDICINE

## 2024-01-24 ASSESSMENT — ENCOUNTER SYMPTOMS
RHINORRHEA: 0
COUGH: 0
NAUSEA: 0
CHEST TIGHTNESS: 0
DIARRHEA: 0
SHORTNESS OF BREATH: 0
WHEEZING: 0
VOMITING: 0
BLOOD IN STOOL: 0
TROUBLE SWALLOWING: 0
BACK PAIN: 0
SINUS PRESSURE: 0
ABDOMINAL DISTENTION: 0
ABDOMINAL PAIN: 0
CONSTIPATION: 0

## 2024-02-17 NOTE — PROGRESS NOTES
Brannon Albright   58 y.o. male   1965    HPI:    Patient was last seen by me on 1/24/2024.  During our last office visit, the main issue(s) that we focused on were:     Mixed anxiety and depressive disorder  Comments:  Continue Sertraline 50 mg for now. He's only been on it for 2 weeks. We discussed SSRI may take 4-6 weeks to see full effect.    Reason(s) for visit:   Chief Complaint   Patient presents with    Follow-up     FMLA paperwork     -Interval history-    [] No new significant health event(s)/problem(s) occurred since our last office visit.    [] No new health issues/concerns discussed during today's office visit.    [x] New health issue(s)/concern(s):    Pt discussed about having FMLA paperwork done.  He stated that FMLA paperwork was not faxed to the correct office.  Of note, pt plans to see me for follow-up for management of his anxiety/depression through medical therapy.  He also will be seeing an endocrinologist (NP?) for management of his type II DM.  He recently got new insulin pump (beta bionic pump).  He reported that he has an abscess of his left maxillofacial abscess and has seen dentists for this and was referred to [facility] surgery whom he will have an appointment on 2/27/2024.  He stated that this abscess is caused by his long hx of uncontrolled diabetes.    [] Other noteworthy comment(s):     -Chronic health issue(s)-    Mixed anxiety and depression:  -Meds tried: Citalopram, Escitalopram, Paroxetine, Fluoxetine, Bupropion, and Sertraline.     1/24/2024 visit:    Pt has various health issues below. His most notable one is worsening of diabetic neuropathy for which he plans to discuss with his [facility] endocrinologist (NP) about.  He also has other health issues anxiety and depression.  He also reported issues of a tooth abscess which he will eventually need to see [facility].  Because of his recent health issues, the patient has a follow-up family, which is the main topic of discussion.  I have

## 2024-02-19 ENCOUNTER — COMMUNITY OUTREACH (OUTPATIENT)
Dept: FAMILY MEDICINE CLINIC | Age: 59
End: 2024-02-19

## 2024-02-21 ENCOUNTER — OFFICE VISIT (OUTPATIENT)
Dept: FAMILY MEDICINE CLINIC | Age: 59
End: 2024-02-21
Payer: COMMERCIAL

## 2024-02-21 VITALS
WEIGHT: 206 LBS | TEMPERATURE: 97.3 F | BODY MASS INDEX: 27.94 KG/M2 | DIASTOLIC BLOOD PRESSURE: 88 MMHG | SYSTOLIC BLOOD PRESSURE: 134 MMHG | HEART RATE: 112 BPM | OXYGEN SATURATION: 98 %

## 2024-02-21 DIAGNOSIS — G62.9 PERIPHERAL POLYNEUROPATHY: ICD-10-CM

## 2024-02-21 DIAGNOSIS — K04.7 DENTAL ABSCESS: ICD-10-CM

## 2024-02-21 DIAGNOSIS — F41.1 GENERALIZED ANXIETY DISORDER: ICD-10-CM

## 2024-02-21 DIAGNOSIS — M1A.09X0 CHRONIC GOUT OF MULTIPLE SITES, UNSPECIFIED CAUSE: ICD-10-CM

## 2024-02-21 DIAGNOSIS — E11.9 INSULIN DEPENDENT TYPE 2 DIABETES MELLITUS (HCC): ICD-10-CM

## 2024-02-21 DIAGNOSIS — Z96.41 INSULIN PUMP IN PLACE: ICD-10-CM

## 2024-02-21 DIAGNOSIS — Z02.89 ENCOUNTER FOR COMPLETION OF FORM WITH PATIENT: Primary | ICD-10-CM

## 2024-02-21 DIAGNOSIS — Z79.4 INSULIN DEPENDENT TYPE 2 DIABETES MELLITUS (HCC): ICD-10-CM

## 2024-02-21 DIAGNOSIS — E66.3 OVERWEIGHT WITH BODY MASS INDEX (BMI) OF 27 TO 27.9 IN ADULT: ICD-10-CM

## 2024-02-21 DIAGNOSIS — Z71.9 HEALTH EDUCATION/COUNSELING: ICD-10-CM

## 2024-02-21 PROCEDURE — 3075F SYST BP GE 130 - 139MM HG: CPT | Performed by: FAMILY MEDICINE

## 2024-02-21 PROCEDURE — 3079F DIAST BP 80-89 MM HG: CPT | Performed by: FAMILY MEDICINE

## 2024-02-21 PROCEDURE — 99214 OFFICE O/P EST MOD 30 MIN: CPT | Performed by: FAMILY MEDICINE

## 2024-02-21 ASSESSMENT — ENCOUNTER SYMPTOMS
NAUSEA: 0
ABDOMINAL DISTENTION: 0
SHORTNESS OF BREATH: 0
VOMITING: 0
WHEEZING: 0
TROUBLE SWALLOWING: 0
CONSTIPATION: 0
RHINORRHEA: 0
COUGH: 0
ABDOMINAL PAIN: 0
BLOOD IN STOOL: 0
DIARRHEA: 0
SINUS PRESSURE: 0
CHEST TIGHTNESS: 0
BACK PAIN: 0

## 2024-03-06 ENCOUNTER — PATIENT MESSAGE (OUTPATIENT)
Dept: FAMILY MEDICINE CLINIC | Age: 59
End: 2024-03-06

## 2024-03-06 DIAGNOSIS — E78.2 MIXED HYPERLIPIDEMIA: ICD-10-CM

## 2024-03-06 DIAGNOSIS — F43.20 ADULT SITUATIONAL STRESS DISORDER: ICD-10-CM

## 2024-03-06 RX ORDER — ROSUVASTATIN CALCIUM 40 MG/1
40 TABLET, COATED ORAL
Qty: 30 TABLET | Refills: 1 | OUTPATIENT
Start: 2024-03-06

## 2024-03-06 NOTE — TELEPHONE ENCOUNTER
From: Brannon Albright  To: Dr. Armando Blas  Sent: 3/6/2024 8:41 AM EST  Subject: I need to cancel todays appointment     Sorry I need to cancel todays appointment     I need to reschedule      Thanks      Brannon albright

## 2024-04-03 ENCOUNTER — PATIENT MESSAGE (OUTPATIENT)
Dept: FAMILY MEDICINE CLINIC | Age: 59
End: 2024-04-03

## 2024-04-03 NOTE — TELEPHONE ENCOUNTER
From: Brannon Albright  To: Dr. Armando Blas  Sent: 4/3/2024 6:21 AM EDT  Subject: Need to cancel today I apologize     I need to reschedule due to work commitment   I apologize thanks      Brannon albright

## 2024-11-27 LAB
ESTIMATED AVERAGE GLUCOSE: NORMAL
HBA1C MFR BLD: 13.7 %

## 2025-01-05 NOTE — TELEPHONE ENCOUNTER
Rescheduled for tomorrow afternoon

## 2025-01-13 ASSESSMENT — ENCOUNTER SYMPTOMS
FLU SYMPTOMS: 1
CHEST TIGHTNESS: 0
SHORTNESS OF BREATH: 0
CONSTIPATION: 0
VOMITING: 0
NAUSEA: 0
ABDOMINAL PAIN: 0
WHEEZING: 0

## 2025-01-14 ENCOUNTER — TELEMEDICINE (OUTPATIENT)
Dept: FAMILY MEDICINE CLINIC | Age: 60
End: 2025-01-14
Payer: COMMERCIAL

## 2025-01-14 DIAGNOSIS — J11.1 INFLUENZA-LIKE ILLNESS: ICD-10-CM

## 2025-01-14 DIAGNOSIS — Z20.828 EXPOSURE TO INFLUENZA: Primary | ICD-10-CM

## 2025-01-14 DIAGNOSIS — E11.69 TYPE 2 DIABETES MELLITUS WITH OTHER SPECIFIED COMPLICATION, WITH LONG-TERM CURRENT USE OF INSULIN (HCC): ICD-10-CM

## 2025-01-14 DIAGNOSIS — R05.1 ACUTE COUGH: ICD-10-CM

## 2025-01-14 DIAGNOSIS — Z79.4 TYPE 2 DIABETES MELLITUS WITH OTHER SPECIFIED COMPLICATION, WITH LONG-TERM CURRENT USE OF INSULIN (HCC): ICD-10-CM

## 2025-01-14 PROCEDURE — 99214 OFFICE O/P EST MOD 30 MIN: CPT | Performed by: CLINICAL NURSE SPECIALIST

## 2025-01-14 RX ORDER — OSELTAMIVIR PHOSPHATE 75 MG/1
75 CAPSULE ORAL 2 TIMES DAILY
Qty: 10 CAPSULE | Refills: 0 | Status: SHIPPED | OUTPATIENT
Start: 2025-01-14 | End: 2025-01-19

## 2025-01-14 RX ORDER — BENZONATATE 100 MG/1
100 CAPSULE ORAL 3 TIMES DAILY PRN
Qty: 30 CAPSULE | Refills: 0 | Status: SHIPPED | OUTPATIENT
Start: 2025-01-14 | End: 2025-01-24

## 2025-01-14 SDOH — ECONOMIC STABILITY: FOOD INSECURITY: WITHIN THE PAST 12 MONTHS, YOU WORRIED THAT YOUR FOOD WOULD RUN OUT BEFORE YOU GOT MONEY TO BUY MORE.: NEVER TRUE

## 2025-01-14 SDOH — ECONOMIC STABILITY: FOOD INSECURITY: WITHIN THE PAST 12 MONTHS, THE FOOD YOU BOUGHT JUST DIDN'T LAST AND YOU DIDN'T HAVE MONEY TO GET MORE.: NEVER TRUE

## 2025-01-14 ASSESSMENT — PATIENT HEALTH QUESTIONNAIRE - PHQ9
9. THOUGHTS THAT YOU WOULD BE BETTER OFF DEAD, OR OF HURTING YOURSELF: NOT AT ALL
7. TROUBLE CONCENTRATING ON THINGS, SUCH AS READING THE NEWSPAPER OR WATCHING TELEVISION: NOT AT ALL
SUM OF ALL RESPONSES TO PHQ QUESTIONS 1-9: 0
1. LITTLE INTEREST OR PLEASURE IN DOING THINGS: NOT AT ALL
SUM OF ALL RESPONSES TO PHQ QUESTIONS 1-9: 0
5. POOR APPETITE OR OVEREATING: NOT AT ALL
6. FEELING BAD ABOUT YOURSELF - OR THAT YOU ARE A FAILURE OR HAVE LET YOURSELF OR YOUR FAMILY DOWN: NOT AT ALL
10. IF YOU CHECKED OFF ANY PROBLEMS, HOW DIFFICULT HAVE THESE PROBLEMS MADE IT FOR YOU TO DO YOUR WORK, TAKE CARE OF THINGS AT HOME, OR GET ALONG WITH OTHER PEOPLE: NOT DIFFICULT AT ALL
SUM OF ALL RESPONSES TO PHQ9 QUESTIONS 1 & 2: 0
8. MOVING OR SPEAKING SO SLOWLY THAT OTHER PEOPLE COULD HAVE NOTICED. OR THE OPPOSITE, BEING SO FIGETY OR RESTLESS THAT YOU HAVE BEEN MOVING AROUND A LOT MORE THAN USUAL: NOT AT ALL
2. FEELING DOWN, DEPRESSED OR HOPELESS: NOT AT ALL
SUM OF ALL RESPONSES TO PHQ QUESTIONS 1-9: 0
SUM OF ALL RESPONSES TO PHQ QUESTIONS 1-9: 0
4. FEELING TIRED OR HAVING LITTLE ENERGY: NOT AT ALL
3. TROUBLE FALLING OR STAYING ASLEEP: NOT AT ALL

## 2025-01-14 ASSESSMENT — ENCOUNTER SYMPTOMS
SWOLLEN GLANDS: 0
DIARRHEA: 1
CHANGE IN BOWEL HABIT: 1
COUGH: 1

## 2025-01-14 ASSESSMENT — ANXIETY QUESTIONNAIRES
4. TROUBLE RELAXING: NOT AT ALL
6. BECOMING EASILY ANNOYED OR IRRITABLE: NOT AT ALL
1. FEELING NERVOUS, ANXIOUS, OR ON EDGE: NOT AT ALL
2. NOT BEING ABLE TO STOP OR CONTROL WORRYING: NOT AT ALL
GAD7 TOTAL SCORE: 0
IF YOU CHECKED OFF ANY PROBLEMS ON THIS QUESTIONNAIRE, HOW DIFFICULT HAVE THESE PROBLEMS MADE IT FOR YOU TO DO YOUR WORK, TAKE CARE OF THINGS AT HOME, OR GET ALONG WITH OTHER PEOPLE: NOT DIFFICULT AT ALL
5. BEING SO RESTLESS THAT IT IS HARD TO SIT STILL: NOT AT ALL
7. FEELING AFRAID AS IF SOMETHING AWFUL MIGHT HAPPEN: NOT AT ALL
3. WORRYING TOO MUCH ABOUT DIFFERENT THINGS: NOT AT ALL

## 2025-01-14 NOTE — PROGRESS NOTES
[place] [id number]  Provider was located at Facility (Appt Dept): [address] [facility].  [address]  [place] [id number]  Confirm you are appropriately licensed, registered, or certified to deliver care in the state where the patient is located as indicated above. If you are not or unsure, please re-schedule the visit: Yes, I confirm.      Total time spent for this encounter: Not billed by time    --CLARY Littlejohn CNP on 1/14/2025 at 3:10 PM    An electronic signature was used to authenticate this note.

## 2025-01-14 NOTE — PATIENT INSTRUCTIONS
Tamiflu 75 mg twice daily for 5 days    Flonase 1-2 puffs to each nostril daily.    Prescription cough medication 2-3 times a day as needed for cough, may cause drowsiness.    Cepacol Lozenges as needed for sore throat.      Tylenol and or Motrin as needed/directed for pain and fever.      Small frequent sips and snacks/meals    Keep diet bland avoid spicy and greasy foods    BRAT diet (bananas, rice, applesauce and toast)    Advance as tolerated (scrambled eggs, baked chicken)    To the ER for increased pain, fever, inability to hold down food and fluids    Monitor blood sugars accordingly    Encourage rest and fluids.      Follow up with PCP if symptoms worsen or persist

## 2025-08-22 ENCOUNTER — HOSPITAL ENCOUNTER (EMERGENCY)
Age: 60
Discharge: HOME | End: 2025-08-22
Attending: EMERGENCY MEDICINE
Payer: COMMERCIAL

## 2025-08-22 VITALS
DIASTOLIC BLOOD PRESSURE: 86 MMHG | RESPIRATION RATE: 16 BRPM | TEMPERATURE: 98.9 F | WEIGHT: 198.97 LBS | HEIGHT: 72 IN | BODY MASS INDEX: 26.95 KG/M2 | HEART RATE: 95 BPM | OXYGEN SATURATION: 94 % | SYSTOLIC BLOOD PRESSURE: 138 MMHG

## 2025-08-22 DIAGNOSIS — L03.221 CELLULITIS OF NECK: ICD-10-CM

## 2025-08-22 DIAGNOSIS — T63.481A LOCAL REACTION TO HYMENOPTERA STING: Primary | ICD-10-CM

## 2025-08-22 PROCEDURE — 6370000000 HC RX 637 (ALT 250 FOR IP): Performed by: EMERGENCY MEDICINE

## 2025-08-22 PROCEDURE — 99283 EMERGENCY DEPT VISIT LOW MDM: CPT

## 2025-08-22 RX ORDER — FAMOTIDINE 20 MG/1
20 TABLET, FILM COATED ORAL ONCE
Status: COMPLETED | OUTPATIENT
Start: 2025-08-22 | End: 2025-08-22

## 2025-08-22 RX ORDER — DOXYCYCLINE HYCLATE 100 MG
100 TABLET ORAL ONCE
Status: COMPLETED | OUTPATIENT
Start: 2025-08-22 | End: 2025-08-22

## 2025-08-22 RX ORDER — PREDNISONE 20 MG/1
40 TABLET ORAL DAILY
Qty: 10 TABLET | Refills: 0 | Status: SHIPPED | OUTPATIENT
Start: 2025-08-22 | End: 2025-08-27

## 2025-08-22 RX ORDER — PREDNISONE 20 MG/1
60 TABLET ORAL ONCE
Status: COMPLETED | OUTPATIENT
Start: 2025-08-22 | End: 2025-08-22

## 2025-08-22 RX ORDER — DIPHENHYDRAMINE HCL 25 MG
25 TABLET ORAL ONCE
Status: COMPLETED | OUTPATIENT
Start: 2025-08-22 | End: 2025-08-22

## 2025-08-22 RX ORDER — DOXYCYCLINE HYCLATE 100 MG
100 TABLET ORAL 2 TIMES DAILY
Qty: 20 TABLET | Refills: 0 | Status: SHIPPED | OUTPATIENT
Start: 2025-08-22 | End: 2025-09-01

## 2025-08-22 RX ADMIN — PREDNISONE 60 MG: 20 TABLET ORAL at 21:04

## 2025-08-22 RX ADMIN — DIPHENHYDRAMINE HCL 25 MG: 25 TABLET ORAL at 21:04

## 2025-08-22 RX ADMIN — DOXYCYCLINE HYCLATE 100 MG: 100 TABLET, COATED ORAL at 21:34

## 2025-08-22 RX ADMIN — FAMOTIDINE 20 MG: 20 TABLET, FILM COATED ORAL at 21:04

## 2025-08-22 ASSESSMENT — PAIN DESCRIPTION - LOCATION
LOCATION: NECK
LOCATION: NECK

## 2025-08-22 ASSESSMENT — LIFESTYLE VARIABLES
HOW MANY STANDARD DRINKS CONTAINING ALCOHOL DO YOU HAVE ON A TYPICAL DAY: PATIENT DOES NOT DRINK
HOW OFTEN DO YOU HAVE A DRINK CONTAINING ALCOHOL: NEVER

## 2025-08-22 ASSESSMENT — PAIN SCALES - GENERAL
PAINLEVEL_OUTOF10: 8
PAINLEVEL_OUTOF10: 8

## 2025-08-22 ASSESSMENT — PAIN DESCRIPTION - DESCRIPTORS: DESCRIPTORS: SHOOTING

## 2025-08-22 ASSESSMENT — PAIN - FUNCTIONAL ASSESSMENT: PAIN_FUNCTIONAL_ASSESSMENT: 0-10
